# Patient Record
Sex: MALE | Race: BLACK OR AFRICAN AMERICAN | NOT HISPANIC OR LATINO | Employment: OTHER | URBAN - METROPOLITAN AREA
[De-identification: names, ages, dates, MRNs, and addresses within clinical notes are randomized per-mention and may not be internally consistent; named-entity substitution may affect disease eponyms.]

---

## 2022-06-29 ENCOUNTER — HOSPITAL ENCOUNTER (EMERGENCY)
Facility: HOSPITAL | Age: 87
Discharge: HOME | End: 2022-06-29
Attending: FAMILY MEDICINE
Payer: MEDICARE

## 2022-06-29 VITALS
SYSTOLIC BLOOD PRESSURE: 115 MMHG | WEIGHT: 198 LBS | RESPIRATION RATE: 16 BRPM | HEIGHT: 68 IN | HEART RATE: 63 BPM | BODY MASS INDEX: 30.01 KG/M2 | TEMPERATURE: 98 F | DIASTOLIC BLOOD PRESSURE: 64 MMHG | OXYGEN SATURATION: 97 %

## 2022-06-29 DIAGNOSIS — N30.01 ACUTE CYSTITIS WITH HEMATURIA: Primary | ICD-10-CM

## 2022-06-29 LAB
APPEARANCE UR: ABNORMAL
BACTERIA #/AREA URNS AUTO: ABNORMAL /HPF
BILIRUB UR QL STRIP.AUTO: NEGATIVE MG/DL
COLOR UR AUTO: ABNORMAL
GLUCOSE UR QL STRIP.AUTO: NEGATIVE MG/DL
KETONES UR QL STRIP.AUTO: NEGATIVE MG/DL
LEUKOCYTE ESTERASE UR QL STRIP.AUTO: 500 UNIT/L
NITRITE UR QL STRIP.AUTO: NEGATIVE
PH UR STRIP.AUTO: 6 [PH]
PROT UR QL STRIP.AUTO: 100 MG/DL
RBC #/AREA URNS AUTO: >=100 /HPF
RBC UR QL AUTO: 3 UNIT/L
SP GR UR STRIP.AUTO: 1.02
UROBILINOGEN UR STRIP-ACNC: NORMAL MG/DL
WBC #/AREA URNS AUTO: ABNORMAL /HPF

## 2022-06-29 PROCEDURE — 25000003 PHARM REV CODE 250: Performed by: FAMILY MEDICINE

## 2022-06-29 PROCEDURE — 81001 URINALYSIS AUTO W/SCOPE: CPT | Performed by: FAMILY MEDICINE

## 2022-06-29 PROCEDURE — 99283 EMERGENCY DEPT VISIT LOW MDM: CPT | Mod: 25

## 2022-06-29 RX ORDER — CEPHALEXIN 500 MG/1
500 CAPSULE ORAL 4 TIMES DAILY
Qty: 28 CAPSULE | Refills: 0 | Status: SHIPPED | OUTPATIENT
Start: 2022-06-29 | End: 2022-07-06

## 2022-06-29 RX ORDER — FINASTERIDE 5 MG/1
5 TABLET, FILM COATED ORAL
Status: ON HOLD | COMMUNITY
End: 2024-01-02

## 2022-06-29 RX ORDER — CEPHALEXIN 500 MG/1
500 CAPSULE ORAL ONCE
Status: COMPLETED | OUTPATIENT
Start: 2022-06-29 | End: 2022-06-29

## 2022-06-29 RX ORDER — TAMSULOSIN HYDROCHLORIDE 0.4 MG/1
0.4 CAPSULE ORAL
Status: ON HOLD | COMMUNITY
End: 2024-01-02

## 2022-06-29 RX ORDER — AMLODIPINE BESYLATE 10 MG/1
10 TABLET ORAL DAILY
Status: ON HOLD | COMMUNITY
End: 2024-01-02

## 2022-06-29 RX ADMIN — CEPHALEXIN 500 MG: 500 CAPSULE ORAL at 02:06

## 2022-06-29 NOTE — DISCHARGE INSTRUCTIONS
You came into the emergency department with blood in your urine.  You tested positive for urinary tract infection.  Received your 1st dose of antibiotics inside the emergency department and will be discharged with antibiotics.  Please note important to finish her full course of antibiotics.    Follow-up with urologist for re-evaluation.  I recommend taking your Flomax as recommended.    Return to the emergency department if he started to feel lightheaded, loss of consciousness, chest pain, trouble breathing, not able to urinate like normal.

## 2022-06-29 NOTE — Clinical Note
Mr. Cadet accompanied their father to the emergency department on 6/29/2022. They may return to work on 06/30/2022.      If you have any questions or concerns, please don't hesitate to call.      Miracle MUSA

## 2022-06-29 NOTE — ED PROVIDER NOTES
Name: Chichi Mckee   Age: 89 y.o.  Sex: male    Chief complaint:   Chief Complaint   Patient presents with    Hematuria     Pt to ed c/o blood in urine x a few days. Reports hx of enlarged prostate      Patient arrived with: Private  History obtained from: Patient    Subjective:   89-year-old male with a past medical history of BPH, hypertension, peripheral arterial disease that presents to emergency department for hematuria.  Patient is hard of hearing, had a son with them who helps with history.  Patient said he has been having hematuria for few days.  Denies dysuria, urinary frequency or urgency.  He has had hematuria in the past was diagnosed with BPH has currently taking medications for it.  Son says he is not consistent with his medications recently.  Denies lightheadedness or dizziness, syncopal episodes.  Denies chest pain or shortness of breath.  Denies fever, chills, sweats, cough, sore throat, runny nose, abdominal pain, nausea, vomiting, diarrhea.    Past Medical History:   Diagnosis Date    Hypertension      No past surgical history on file.  Social History     Socioeconomic History    Marital status:    Tobacco Use    Smoking status: Never Smoker    Smokeless tobacco: Never Used   Substance and Sexual Activity    Alcohol use: Yes    Drug use: Never     Review of patient's allergies indicates:   Allergen Reactions    Hydrocodone-acetaminophen         Review of Systems   Constitutional: Negative for diaphoresis and fever.   HENT: Negative for congestion and sore throat.    Eyes: Negative for pain and discharge.   Respiratory: Negative for cough and shortness of breath.    Cardiovascular: Negative for chest pain and palpitations.   Gastrointestinal: Negative for diarrhea and vomiting.   Genitourinary: Positive for hematuria. Negative for dysuria.   Musculoskeletal: Negative for back pain and myalgias.   Skin: Negative for itching and rash.   Neurological: Negative for weakness and  headaches.          Objective:     Vitals:    06/29/22 1131   BP: 107/61   Pulse: 68   Resp:    Temp:         Physical Exam  Constitutional:       Appearance: He is not toxic-appearing.   HENT:      Head: Normocephalic and atraumatic.   Cardiovascular:      Rate and Rhythm: Regular rhythm.      Pulses: Normal pulses.   Pulmonary:      Effort: Pulmonary effort is normal.      Breath sounds: Normal breath sounds.   Abdominal:      General: Abdomen is flat. Bowel sounds are normal.      Palpations: Abdomen is soft.      Tenderness: There is no right CVA tenderness or left CVA tenderness.   Musculoskeletal:         General: No deformity. Normal range of motion.      Cervical back: Normal range of motion and neck supple.   Skin:     General: Skin is warm and dry.   Neurological:      General: No focal deficit present.      Mental Status: He is alert and oriented to person, place, and time.   Psychiatric:         Mood and Affect: Mood normal.         Behavior: Behavior normal.          Records:  Nursing records and triage records reviewed  Prior records reviewed    Medical decision making:   Presents to the emergency department for hematuria, history of BPH and not consistent with this Flomax.  Patient is stable and nontoxic appearing.  Vital signs are within normal limits.  Physical exam within normal limits with no CVA tenderness and no abdominal tenderness.  Will get a UA for further evaluation.  Low suspicion for anemia with the amount of hematuria patient has been having and has no symptomatic signs, does not need further labs at this time.    ED Course as of 06/29/22 1356   Wed Jun 29, 2022   1354 UA concerning for urinary tract infection.  Patient received 1st dose of antibiotics inside the emergency department.    Re-evaluation patient continues to be comfortable nontoxic appearing.  He will be okay for discharge and follow up with his urologist for re-evaluation.  We discussed return precautions and I listed in  the discharge instructions.  Patient understands the plan, had no further questions, felt safe for discharge. [RK]      ED Course User Index  [RK] Ramírez Sue MD          EKG:       Procedures       Diagnosis:  Final diagnoses:  [N30.01] Acute cystitis with hematuria (Primary)     ED Prescriptions     Medication Sig Dispense Start Date End Date Auth. Provider    cephALEXin (KEFLEX) 500 MG capsule Take 1 capsule (500 mg total) by mouth 4 (four) times daily. for 7 days 28 capsule 6/29/2022 7/6/2022 Ramírez Sue MD        Follow-up Information     Follow up With Specialties Details Why Contact Info    [facility]   [address] [facility] [address] [phone]          Ramírez Sue M.D.  Emergency Medicine Physician     (Please note that this chart was completed via voice to text dictation. There may be typographical errors or substitutions that are unintentional, or uncorrected. Every attempt was made to proofread the chart prior to completion. If there are any questions, please contact the physician for final clarification).       Ramírez Sue MD  06/29/22 3376

## 2022-06-29 NOTE — Clinical Note
Mr. Cadet accompanied their child to the emergency department on 6/29/2022. They may return to work on 06/30/2022.      If you have any questions or concerns, please don't hesitate to call.      Miracle MUSA

## 2022-06-29 NOTE — Clinical Note
Mr. Mckee accompanied their father to the emergency department on 6/29/2022. They may return to work on 06/30/2022.      If you have any questions or concerns, please don't hesitate to call.      Miracle MUSA

## 2022-07-03 LAB
BACTERIA UR CULT: ABNORMAL
BACTERIA UR CULT: ABNORMAL

## 2023-12-16 ENCOUNTER — HOSPITAL ENCOUNTER (INPATIENT)
Facility: HOSPITAL | Age: 88
LOS: 5 days | Discharge: NURSING, REHAB OR LONG-TERM CARE | DRG: 062 | End: 2023-12-21
Attending: EMERGENCY MEDICINE | Admitting: INTERNAL MEDICINE
Payer: MEDICARE

## 2023-12-16 DIAGNOSIS — I63.9 CVA (CEREBRAL VASCULAR ACCIDENT): ICD-10-CM

## 2023-12-16 DIAGNOSIS — R29.818 ACUTE FOCAL NEUROLOGICAL DEFICIT: ICD-10-CM

## 2023-12-16 DIAGNOSIS — I63.9 STROKE: ICD-10-CM

## 2023-12-16 LAB
ALBUMIN SERPL-MCNC: 3.4 G/DL (ref 3.4–4.8)
ALBUMIN/GLOB SERPL: 1 RATIO (ref 1.1–2)
ALP SERPL-CCNC: 83 UNIT/L (ref 40–150)
ALT SERPL-CCNC: 11 UNIT/L (ref 0–55)
ANION GAP SERPL CALC-SCNC: 16 MMOL/L (ref 8–16)
AST SERPL-CCNC: 12 UNIT/L (ref 5–34)
BASOPHILS # BLD AUTO: 0.04 X10(3)/MCL
BASOPHILS NFR BLD AUTO: 1.1 %
BILIRUB SERPL-MCNC: 0.7 MG/DL
BUN SERPL-MCNC: 16 MG/DL (ref 8.4–25.7)
BUN SERPL-MCNC: 18 MG/DL (ref 6–30)
CALCIUM SERPL-MCNC: 9.9 MG/DL (ref 8.8–10)
CHLORIDE SERPL-SCNC: 106 MMOL/L (ref 95–110)
CHLORIDE SERPL-SCNC: 111 MMOL/L (ref 98–111)
CHOLEST SERPL-MCNC: 152 MG/DL
CHOLEST/HDLC SERPL: 5 {RATIO} (ref 0–5)
CO2 SERPL-SCNC: 20 MMOL/L (ref 23–31)
CREAT SERPL-MCNC: 1.4 MG/DL (ref 0.5–1.4)
CREAT SERPL-MCNC: 1.41 MG/DL (ref 0.73–1.18)
EOSINOPHIL # BLD AUTO: 0.1 X10(3)/MCL (ref 0–0.9)
EOSINOPHIL NFR BLD AUTO: 2.9 %
ERYTHROCYTE [DISTWIDTH] IN BLOOD BY AUTOMATED COUNT: 13.7 % (ref 11.5–17)
GFR SERPLBLD CREATININE-BSD FMLA CKD-EPI: 46 MLS/MIN/1.73/M2
GLOBULIN SER-MCNC: 3.5 GM/DL (ref 2.4–3.5)
GLUCOSE SERPL-MCNC: 78 MG/DL (ref 70–110)
GLUCOSE SERPL-MCNC: 90 MG/DL (ref 75–121)
HCT VFR BLD AUTO: 45.8 % (ref 42–52)
HCT VFR BLD CALC: 47 %PCV (ref 36–54)
HDLC SERPL-MCNC: 33 MG/DL (ref 35–60)
HGB BLD-MCNC: 14.9 G/DL (ref 14–18)
HGB BLD-MCNC: 16 G/DL
IMM GRANULOCYTES # BLD AUTO: 0.01 X10(3)/MCL (ref 0–0.04)
IMM GRANULOCYTES NFR BLD AUTO: 0.3 %
INR PPP: 1.1
LDLC SERPL CALC-MCNC: 105 MG/DL (ref 50–140)
LYMPHOCYTES # BLD AUTO: 1.05 X10(3)/MCL (ref 0.6–4.6)
LYMPHOCYTES NFR BLD AUTO: 30.2 %
MCH RBC QN AUTO: 28.8 PG (ref 27–31)
MCHC RBC AUTO-ENTMCNC: 32.5 G/DL (ref 33–36)
MCV RBC AUTO: 88.4 FL (ref 80–94)
MONOCYTES # BLD AUTO: 0.23 X10(3)/MCL (ref 0.1–1.3)
MONOCYTES NFR BLD AUTO: 6.6 %
NEUTROPHILS # BLD AUTO: 2.05 X10(3)/MCL (ref 2.1–9.2)
NEUTROPHILS NFR BLD AUTO: 58.9 %
NRBC BLD AUTO-RTO: 0 %
PLATELET # BLD AUTO: 173 X10(3)/MCL (ref 130–400)
PMV BLD AUTO: 11.2 FL (ref 7.4–10.4)
POC IONIZED CALCIUM: 1.25 MMOL/L (ref 1.06–1.42)
POC PTINR: 1 (ref 0.9–1.2)
POC PTWBT: 12.2 SEC (ref 9.7–14.3)
POC TCO2 (MEASURED): 25 MMOL/L (ref 23–29)
POCT GLUCOSE: 102 MG/DL (ref 70–110)
POCT GLUCOSE: 99 MG/DL (ref 70–110)
POTASSIUM BLD-SCNC: 3.5 MMOL/L (ref 3.5–5.1)
POTASSIUM SERPL-SCNC: 3.8 MMOL/L (ref 3.5–5.1)
PROT SERPL-MCNC: 6.9 GM/DL (ref 5.8–7.6)
PROTHROMBIN TIME: 13.6 SECONDS (ref 12.5–14.5)
RBC # BLD AUTO: 5.18 X10(6)/MCL (ref 4.7–6.1)
SAMPLE: NORMAL
SAMPLE: NORMAL
SODIUM BLD-SCNC: 142 MMOL/L (ref 136–145)
SODIUM SERPL-SCNC: 139 MMOL/L (ref 132–146)
TRIGL SERPL-MCNC: 71 MG/DL (ref 34–140)
TSH SERPL-ACNC: 2.38 UIU/ML (ref 0.35–4.94)
VLDLC SERPL CALC-MCNC: 14 MG/DL
WBC # SPEC AUTO: 3.48 X10(3)/MCL (ref 4.5–11.5)

## 2023-12-16 PROCEDURE — 99223 1ST HOSP IP/OBS HIGH 75: CPT | Mod: ,,, | Performed by: PSYCHIATRY & NEUROLOGY

## 2023-12-16 PROCEDURE — 25000003 PHARM REV CODE 250

## 2023-12-16 PROCEDURE — 96374 THER/PROPH/DIAG INJ IV PUSH: CPT

## 2023-12-16 PROCEDURE — 84443 ASSAY THYROID STIM HORMONE: CPT | Performed by: EMERGENCY MEDICINE

## 2023-12-16 PROCEDURE — 80061 LIPID PANEL: CPT | Performed by: EMERGENCY MEDICINE

## 2023-12-16 PROCEDURE — 80053 COMPREHEN METABOLIC PANEL: CPT | Performed by: EMERGENCY MEDICINE

## 2023-12-16 PROCEDURE — 63600175 PHARM REV CODE 636 W HCPCS: Mod: JG

## 2023-12-16 PROCEDURE — 20000000 HC ICU ROOM

## 2023-12-16 PROCEDURE — 85610 PROTHROMBIN TIME: CPT | Performed by: EMERGENCY MEDICINE

## 2023-12-16 PROCEDURE — 93005 ELECTROCARDIOGRAM TRACING: CPT

## 2023-12-16 PROCEDURE — A4216 STERILE WATER/SALINE, 10 ML: HCPCS | Performed by: EMERGENCY MEDICINE

## 2023-12-16 PROCEDURE — 3E03317 INTRODUCTION OF OTHER THROMBOLYTIC INTO PERIPHERAL VEIN, PERCUTANEOUS APPROACH: ICD-10-PCS | Performed by: EMERGENCY MEDICINE

## 2023-12-16 PROCEDURE — 99285 EMERGENCY DEPT VISIT HI MDM: CPT | Mod: 25

## 2023-12-16 PROCEDURE — 93010 ELECTROCARDIOGRAM REPORT: CPT | Mod: ,,, | Performed by: INTERNAL MEDICINE

## 2023-12-16 PROCEDURE — 85025 COMPLETE CBC W/AUTO DIFF WBC: CPT | Performed by: EMERGENCY MEDICINE

## 2023-12-16 PROCEDURE — 80048 BASIC METABOLIC PNL TOTAL CA: CPT | Mod: XB

## 2023-12-16 PROCEDURE — 25000003 PHARM REV CODE 250: Performed by: EMERGENCY MEDICINE

## 2023-12-16 RX ORDER — SODIUM CHLORIDE 0.9 % (FLUSH) 0.9 %
10 SYRINGE (ML) INJECTION ONCE
Status: COMPLETED | OUTPATIENT
Start: 2023-12-16 | End: 2023-12-16

## 2023-12-16 RX ORDER — SODIUM CHLORIDE 9 MG/ML
INJECTION, SOLUTION INTRAVENOUS CONTINUOUS
Status: DISCONTINUED | OUTPATIENT
Start: 2023-12-16 | End: 2023-12-21 | Stop reason: HOSPADM

## 2023-12-16 RX ORDER — LABETALOL HYDROCHLORIDE 5 MG/ML
10 INJECTION, SOLUTION INTRAVENOUS EVERY 4 HOURS PRN
Status: DISCONTINUED | OUTPATIENT
Start: 2023-12-16 | End: 2023-12-21 | Stop reason: HOSPADM

## 2023-12-16 RX ORDER — HYDRALAZINE HYDROCHLORIDE 20 MG/ML
10 INJECTION INTRAMUSCULAR; INTRAVENOUS EVERY 6 HOURS PRN
Status: DISCONTINUED | OUTPATIENT
Start: 2023-12-16 | End: 2023-12-21 | Stop reason: HOSPADM

## 2023-12-16 RX ADMIN — Medication 22.5 MG: at 12:12

## 2023-12-16 RX ADMIN — SODIUM CHLORIDE, PRESERVATIVE FREE 10 ML: 5 INJECTION INTRAVENOUS at 12:12

## 2023-12-16 RX ADMIN — SODIUM CHLORIDE: 9 INJECTION, SOLUTION INTRAVENOUS at 02:12

## 2023-12-16 NOTE — Clinical Note
Diagnosis: Acute focal neurological deficit [331517]   Admitting Provider:: CINTIA CORTES [[id number]]   Future Attending Provider: CINTIA CORTES [[id number]]   Admit to which facility:: [facility] [[id number]]   Reason for IP Medical Treatment  (Clinical interventions that can only be accomplished in the IP setting? ) :: Needs inpatient managment   Estimated Length of Stay:: 3-4 midnights   I certify that Inpatient services for greater than or equal to 2 midnights are medically necessary:: Yes   Plans for Post-Acute care--if anticipated (pick the single best option):: A. No post acute care anticipated at this time   Special Needs:: No Special Needs [1]

## 2023-12-16 NOTE — HPI
Chichi Mckee is a 89-year-old male with past medical history of of hypertension who presented as a Code Fast to [facility] ER with complaints of slurred speech, right facial droop, right-sided weakness. LKN 11 am this morning. Family reports, that he was having some dysarthria and ataxia yesterday at noon but symptoms resolved. Symptoms started again but much more severe. NIH 13. CT head negative for bleed or acute infarct. Spoke with Dr. Rahman who recommended TNK. TNK given at 1248. Appears to be possible LVO. CTA pending. Will update once final read is available.

## 2023-12-16 NOTE — CONSULTS
[facility] - Emergency Dept  Neurology  Consult Note    Patient Name: Chichi Mckee  MRN: [id number]  Admission Date: 12/16/2023  Hospital Length of Stay: 0 days  Code Status: No Order   Attending Provider: Dandy Duncan III, MD   Consulting Provider: Mariel Ojeda NP  Primary Care Physician: No, Primary Doctor  Principal Problem:<principal problem not specified>    Inpatient consult to Vascular (Stroke) Neurology  Consult performed by: Mariel Ojeda NP  Consult ordered by: Dandy Duncan III, MD         Subjective:     Chief Complaint:  No chief complaint on file.         HPI:   Chichi Mckee is a 89-year-old male with past medical history of of hypertension who presented as a Code Fast to [facility] ER with complaints of slurred speech, right facial droop, right-sided weakness. LKN 11 am this morning. Family reports, that he was having some dysarthria and ataxia yesterday at noon but symptoms resolved. Symptoms started again but much more severe. NIH 13. CT head negative for bleed or acute infarct. Spoke with Dr. Rahman who recommended TNK. TNK given at 1248. Appears to be possible LVO. CTA pending. Will update once final read is available.      Past Medical History:   Diagnosis Date    Hypertension        No past surgical history on file.    Review of patient's allergies indicates:   Allergen Reactions    Hydrocodone-acetaminophen        Current Neurological Medications:     No current facility-administered medications on file prior to encounter.     Current Outpatient Medications on File Prior to Encounter   Medication Sig    amLODIPine (NORVASC) 10 MG tablet Take 10 mg by mouth once daily.    finasteride (PROSCAR) 5 mg tablet Take 5 mg by mouth.    tamsulosin (FLOMAX) 0.4 mg Cap Take 0.4 mg by mouth.     Family History    None       Tobacco Use    Smoking status: Never    Smokeless tobacco: Never   Substance and Sexual Activity    Alcohol use: Yes    Drug use: Never    Sexual  "activity: Not on file     Review of Systems   Unable to perform ROS: Acuity of condition     Objective:     Vital Signs (Most Recent):  Temp: 97.2 °F (36.2 °C) (12/16/23 1226)  Pulse: 99 (12/16/23 1226)  Resp: 18 (12/16/23 1226)  BP: (!) 162/90 (12/16/23 1226)  SpO2: 98 % (12/16/23 1226) Vital Signs (24h Range):  Temp:  [97.2 °F (36.2 °C)] 97.2 °F (36.2 °C)  Pulse:  [99] 99  Resp:  [18] 18  SpO2:  [98 %] 98 %  BP: (162)/(90) 162/90     Weight: 89.8 kg (198 lb)  Body mass index is 28.41 kg/m².     Physical Exam  HENT:      Head: Normocephalic and atraumatic.      Mouth/Throat:      Mouth: Mucous membranes are moist.   Eyes:      Extraocular Movements: Extraocular movements intact.      Pupils: Pupils are equal, round, and reactive to light.   Pulmonary:      Effort: Pulmonary effort is normal.   Musculoskeletal:         General: Normal range of motion.      Cervical back: Normal range of motion and neck supple.   Skin:     General: Skin is warm and dry.      Capillary Refill: Capillary refill takes less than 2 seconds.   Neurological:      Mental Status: He is alert.      Comments: Alert. Followed commands. Severe dysarthria with aphasia.  RUE drift  LUE 5/5  RLE no movement  LLE 5/5            NEUROLOGICAL EXAMINATION:     CRANIAL NERVES     CN III, IV, VI   Pupils are equal, round, and reactive to light.      Significant Labs: CBC:   Recent Labs   Lab 12/16/23  1243   HCT 47     CMP: No results for input(s): "GLU", "NA", "K", "CL", "CO2", "BUN", "CREATININE", "CALCIUM", "MG", "PROT", "ALBUMIN", "BILITOT", "ALKPHOS", "AST", "ALT", "ANIONGAP", "EGFRNONAA" in the last 48 hours.    Significant Imaging: I have reviewed all pertinent imaging results/findings within the past 24 hours.  Assessment and Plan:     Stroke  Presented with MARJ JOSEPH, on 112/16/23  RF: Hypertension  Etiology: TBD    Plan:    S/p TNK    -permissive HTN for now ... SBP less than 180  -q1hr neuro checks  -STAT CTh for any HA or neuro change  -HOB " flat, Bedrest, NPO x24 hours post TNK  -repeat CTh after 24 hours to determine if antiplatelet therapy can be initiated     CTA pending. Will update if MT indicated    Stroke documentation:    Code FAST 1230  NP 1231  MD notified 1233  CT head negative, spoke with radiology at 1242  TNK decision with MD 1244  TNK received 1248  MT- CTA pending. Will update   NIH 13        VTE Risk Mitigation (From admission, onward)      None            Thank you for your consult. Will follow-up with patient. Please contact us if you have any additional questions.    Mariel Ojeda NP  Neurology  [facility] - Emergency Dept

## 2023-12-16 NOTE — ED PROVIDER NOTES
Encounter Date: 12/16/2023    SCRIBE #1 NOTE: I, Sylvie Schaeffer, am scribing for, and in the presence of,  Dandy Duncan III, MD. I have scribed the following portions of the note - Other sections scribed: HPI, ROS, PE.       History   No chief complaint on file.    89 year old male with history of HTN presents to the ED via EMS for stroke-like symptoms. Per EMS, pt began having slurred speech yesterday afternoon with decreased dexterity. Pt's symptoms improved by last night. This morning, pt began stuttering and has right arm weakness and numbness. Pt's relative notes that he was last seen normal at noon yesterday.  But completely improved and at 9:00 a.m. this morning was fine and symptoms started again at 11:00 a.m.    The history is provided by the EMS personnel and a relative. The history is limited by the condition of the patient. No  was used.     Review of patient's allergies indicates:   Allergen Reactions    Hydrocodone-acetaminophen      Past Medical History:   Diagnosis Date    Hypertension      No past surgical history on file.  No family history on file.  Social History     Tobacco Use    Smoking status: Never    Smokeless tobacco: Never   Substance Use Topics    Alcohol use: Yes    Drug use: Never     Review of Systems   Unable to perform ROS: Mental status change       Physical Exam     Initial Vitals [12/16/23 1226]   BP Pulse Resp Temp SpO2   (!) 162/90 99 18 97.2 °F (36.2 °C) 98 %      MAP       --         Physical Exam    Nursing note and vitals reviewed.  Constitutional: No distress.   HENT:   Head: Normocephalic and atraumatic.   Neck: Trachea normal.   Cardiovascular:  Normal rate and regular rhythm.           No murmur heard.  Pulmonary/Chest: Breath sounds normal. No respiratory distress.   Abdominal: Abdomen is soft. Bowel sounds are normal. He exhibits no distension. There is no abdominal tenderness.   Musculoskeletal:         General: Normal range of motion.       Lumbar back: Normal.     Neurological: He is alert. No cranial nerve deficit.   Stuttering speech. Not following commands. Right arm flaccid, paralysis    Skin: Skin is warm and dry. No rash noted.         ED Course   Critical Care    Date/Time: 12/16/2023 2:11 PM    Performed by: Dandy Duncan III, MD  Authorized by: Dandy Duncan III, MD  Direct patient critical care time: 45 minutes  Documentation critical care time: 5 minutes  Consulting other physicians critical care time: 5 minutes  Total critical care time (exclusive of procedural time) : 55 minutes  Critical care was necessary to treat or prevent imminent or life-threatening deterioration of the following conditions: CNS failure or compromise.  Critical care was time spent personally by me on the following activities: discussions with primary provider, discussions with consultants, examination of patient, re-evaluation of patient's condition, ordering and review of laboratory studies, ordering and performing treatments and interventions, evaluation of patient's response to treatment, interpretation of cardiac output measurements, ordering and review of radiographic studies and pulse oximetry.        Labs Reviewed   COMPREHENSIVE METABOLIC PANEL - Abnormal; Notable for the following components:       Result Value    Carbon Dioxide 20 (*)     Creatinine 1.41 (*)     Albumin/Globulin Ratio 1.0 (*)     All other components within normal limits   LIPID PANEL - Abnormal; Notable for the following components:    HDL Cholesterol 33 (*)     All other components within normal limits   CBC WITH DIFFERENTIAL - Abnormal; Notable for the following components:    WBC 3.48 (*)     MCHC 32.5 (*)     MPV 11.2 (*)     Neut # 2.05 (*)     All other components within normal limits   PROTIME-INR - Normal   TSH - Normal   CBC W/ AUTO DIFFERENTIAL    Narrative:     The following orders were created for panel order CBC W/ AUTO DIFFERENTIAL.  Procedure                                Abnormality         Status                     ---------                               -----------         ------                     CBC with Differential[[id number]]        Abnormal            Final result                 Please view results for these tests on the individual orders.   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    There is a long segment area of multifocal narrowing in the basilar artery which could represent vaso spasm or areas of multifocal narrowing.    60-70% stenosis in the proximal left internal carotid artery secondary to plaque    No large vessel occlusion seen    A 50% stenosis in the supraclinoid right internal carotid artery    Left thyroid nodules    This report was flagged in Epic as abnormal.      Electronically signed by: Eleuterio Bradley  Date:    12/16/2023  Time:    13:12               Narrative:    EXAMINATION:  CTA STROKE MULTI-PHASE    CLINICAL HISTORY:  Neuro deficit, acute, stroke suspected;    TECHNIQUE:  Non contrast low dose axial images were obtained through the head.  CT angiogram was performed from the level of the daniel to the top of the head following the IV administration 100 cc of Isovue 370 contrast .  Sagittal and coronal reconstructions and maximum intensity projection reconstructions were performed. Arterial stenosis percentages are based on NASCET measurement criteria.  Additional multiplanar reconstructions were performed on post processed imaging.  Automatic exposure control (AEC) is utilized to reduce patient radiation exposure.    COMPARISON:  None    FINDINGS:  Source images: No intracranial mass lesion is seen.  No hemorrhage is seen.  No infarct is seen.  Ventricles and basilar  cisterns appear grossly unremarkable.  No cervical mass or lesion is seen.  No abnormal lymphadenopathy seen.  There appears to be some nodularity in the left thyroid..  Larynx appears normal.  Trachea is midline.  Thoracic inlet shows some chronic interstitial lung changes in the lungs bilaterally..    Vascular images: The aortic arch has some mild atherosclerotic plaque.  No aneurysmal dilatation is seen.  The brachiocephalic artery is ectatic.  There is a 3 vessel arch seen.  Some atherosclerotic plaque is seen at the origin of the left subclavian artery but no significant stenosis is seen.    Both common carotid arteries are widely patent.  There is some calcified plaque seen in the left carotid bulb with approximate 60 to 70% stenosis in the proximal internal carotid artery on the left.  The proximal internal carotid artery is ectatic.    There is calcified plaque in the right carotid bulb and proximal internal carotid artery with a 40% stenosis seen in the proximal internal carotid on the right.  The distal internal carotid arteries are seen to level the petrous ridge and clinoid supraclinoid portions.  There are widely patent.  Some atherosclerotic plaque is seen at the supraclinoid portion of both internal carotid arteries with a 50% stenosis on the right and no significant stenosis on the left.  The MCAs are patent.  M1 segments, M2 segments and M3 segments are patent.  A1 segments are patent.  No thrombus or aneurysm is seen.  Anterior cerebral arteries patent.  No aneurysm or obstruction is seen.  The left vertebral artery is not patent.  It is seen at the origin from the subclavian artery for proximally 1 cm and then no flow is seen in the left vertebral artery.  The right vertebral artery is patent.  There is calcified plaque in the right vertebral artery at the base of the skull.  There is approximately a 80-90% stenosis in the right distal vertebral artery at the base of the skull.  Right vertebral  artery perfuses the basilar artery.  There is reflux of contrast into the distal left vertebral artery from the right vertebral artery.  The basilar artery is narrowed in the mid and distal portion.  Multifocal areas of narrowing are seen concerning for possible vaso spasm.  Other etiologies could include multifocal areas of stenosis.    The posterior cerebral arteries are widely patent.  No aneurysm or obstruction is seen.    .                                       CT HEAD FOR STROKE (Final result)  Result time 12/16/23 12:48:16      Final result by Ronnie Santacruz MD (12/16/23 12:48:16)                   Impression:      No acute intracranial abnormality identified.  Findings of chronic microvascular ischemic disease.    Findings reported to [facility] prior to interpretation.      Electronically signed by: Ronnie Santacruz  Date:    12/16/2023  Time:    12:48               Narrative:    EXAMINATION:  CT HEAD FOR STROKE    CLINICAL HISTORY:  Neuro deficit, acute, stroke suspected;    TECHNIQUE:  Low dose axial images were obtained through the head.  Coronal and sagittal reformations were also performed. Contrast was not administered.    Automatic exposure control was utilized to reduce the patient's radiation dose.    DLP= 943    COMPARISON:  10/02/2014    FINDINGS:  No acute intracranial hemorrhage, edema or mass. No acute parenchymal abnormality.    Diffuse cerebral atrophy with concordant ventricular enlargement.    Scattered hypodensities throughout the deep periventricular white matter.    The osseous structures are normal.    The mastoid air cells are clear.    The auditory canals are patent bilaterally.    The globes and orbital contents are normal bilaterally.    The visualized maxillary, ethmoid and sphenoid sinuses are clear.                                       Medications   tenecteplase (TNKase) IV KIT 22.5 mg (22.5 mg Intravenous Given 12/16/23 1248)     Followed by   sodium chloride 0.9% flush 10 mL  (10 mLs Intravenous Given 12/16/23 1250)     Medical Decision Making  Differential diagnosis includes but is not limited to TIA, CVA     Initial confusion about last known well times but after discussion with multiple family members it was nail down that patient had acute onset of the symptoms at 11 after being completely normal was seen and sent to CT for code fast protocol patient's CT head without abnormality patient without contraindications within window family states that patient is normally awake alert oriented active walks patient given T and case at 18 minute daquan patient with moderate improvement NIH went from around 14to6 patient now able to speak CT angio of the brain did not reveal any MCA occlusion will not go to cath lab discussed case with intensivist care team will admit    Problems Addressed:  Acute focal neurological deficit: complicated acute illness or injury with systemic symptoms that poses a threat to life or bodily functions    Amount and/or Complexity of Data Reviewed  Independent Historian: EMS     Details: Per EMS, pt began having slurred speech yesterday afternoon with decreased dexterity. Pt's symptoms improved by last night. This morning, pt began stuttering and has right arm weakness and numbness. Pt's relative notes that he was last seen normal at noon yesterday  External Data Reviewed: notes.  Labs: ordered.  Radiology: ordered.    Risk  Prescription drug management.  Decision regarding hospitalization.              Attending Attestation:           Physician Attestation for Scribe:  Physician Attestation Statement for Scribe #1: I, Dandy Duncan III, MD, reviewed documentation, as scribed by Sylvie Schaeffer in my presence, and it is both accurate and complete.                                    Clinical Impression:  Final diagnoses:  [R29.818] Acute focal neurological deficit          ED Disposition Condition    Admit                 Dandy Duncan III, MD  12/16/23 2023

## 2023-12-16 NOTE — H&P
[facility] - Emergency Dept  Pulmonary Critical Care Note    Patient Name: Chichi Mckee  MRN: [id number]  Admission Date: 12/16/2023  Hospital Length of Stay: 0 days  Code Status: No Order  Attending Provider: Jessica Padilla MD  Primary Care Provider: Bia, Primary Doctor     Subjective:     HPI:   Patient is a 89-year-old male with past medical history of BPH, hypertension, peripheral arterial disease, who presented to [facility] ED on 12/16/2023 via EMS for stroke like symptoms.  Patient reported to have slurred speech starting afternoon which improved over the course of night.  Patient then started having speech stuttering and right arm weakness and numbness in the morning improved by 9:00 a.m. but then again started around 11:00 a.m. code fast activated in the ED. CT head with no acute intracranial abnormalities but findings of chronic muscular vascular ischemic changes.  Decision was made to administer TN K after discussion with Neurology.  Initial NIH reported to be 13 per Neurology notes.    Initial vitals blood pressure 162/90, heart rate 99, respiratory rate 18, afebrile 97° F, SpO2 98% on room air.  Labs with leukopenia 3.48, H and H 14.9/45.8, platelets 173.  PT INR 13.6/1.1, CMP with sodium 139, potassium 3.8, bicarb 20, BUN/creatinine 16/1.41.     Hospital Course/Significant events:  12/16-TNKase administered in the ED and admitted to ICU    24 Hour Interval History:  Pending    Past Medical History:   Diagnosis Date    Hypertension        No past surgical history on file.    Social History     Socioeconomic History    Marital status:    Tobacco Use    Smoking status: Never    Smokeless tobacco: Never   Substance and Sexual Activity    Alcohol use: Yes    Drug use: Never           Current Outpatient Medications   Medication Instructions    amLODIPine (NORVASC) 10 mg, Oral, Daily    finasteride (PROSCAR) 5 mg, Oral    tamsulosin (FLOMAX) 0.4 mg, Oral       Current Inpatient  Medications      Current Intravenous Infusions        Review of Systems   Unable to perform ROS: Age          Objective:     No intake or output data in the 24 hours ending 12/16/23 1507      Vital Signs (Most Recent):  Temp: 98 °F (36.7 °C) (12/16/23 1430)  Pulse: 62 (12/16/23 1500)  Resp: 20 (12/16/23 1500)  BP: (!) 136/59 (12/16/23 1500)  SpO2: 100 % (12/16/23 1500)  Body mass index is 28.41 kg/m².  Weight: 89.8 kg (198 lb) Vital Signs (24h Range):  Temp:  [97.2 °F (36.2 °C)-98 °F (36.7 °C)] 98 °F (36.7 °C)  Pulse:  [62-99] 62  Resp:  [10-26] 20  SpO2:  [98 %-100 %] 100 %  BP: (112-162)/(53-97) 136/59     Physical Exam  Vitals reviewed.   Constitutional:       Appearance: Normal appearance.   HENT:      Head: Normocephalic and atraumatic.   Eyes:      Extraocular Movements: Extraocular movements intact.      Conjunctiva/sclera: Conjunctivae normal.      Pupils: Pupils are equal, round, and reactive to light.   Cardiovascular:      Rate and Rhythm: Normal rate and regular rhythm.   Pulmonary:      Effort: Pulmonary effort is normal. No respiratory distress.      Breath sounds: Normal breath sounds.   Abdominal:      General: Bowel sounds are normal. There is no distension.      Palpations: Abdomen is soft. There is no mass.   Musculoskeletal:      Cervical back: Normal range of motion and neck supple.      Comments: Trace pitting edema bilateral lower extremities.   Skin:     General: Skin is warm and dry.   Neurological:      Mental Status: He is alert.      Comments: Secondary to patient with hard hearing.  Oriented to time and place.  Slight pronator drift on right side. Weaker on right side and able to squeeze.  Able to lift legs off bed with 5/5 strength left side, 4/5 strength right lower extremity.           Lines/Drains/Airways       Peripheral Intravenous Line  Duration                  Peripheral IV - Single Lumen 12/16/23 18 G Anterior;Left Forearm <1 day         Peripheral IV - Single Lumen 12/16/23 18  "G Left Antecubital <1 day                    Significant Labs:    Lab Results   Component Value Date    WBC 3.48 (L) 12/16/2023    HGB 14.9 12/16/2023    HCT 45.8 12/16/2023    MCV 88.4 12/16/2023     12/16/2023           BMP  Lab Results   Component Value Date     12/16/2023    K 3.8 12/16/2023    CHLORIDE 111 12/16/2023    CO2 20 (L) 12/16/2023    BUN 16.0 12/16/2023    CREATININE 1.41 (H) 12/16/2023    CALCIUM 9.9 12/16/2023    EGFRNONAA 56 03/26/2019         ABG  No results for input(s): "PH", "PO2", "PCO2", "HCO3", "POCBASEDEF" in the last 168 hours.    Mechanical Ventilation Support:         Significant Imaging:  I have reviewed the pertinent imaging within the past 24 hours.        Assessment/Plan:     Assessment  Right facial droop, right-sided weakness, slurred speech symptoms status post TNKase  BRIAN  Past medical history of hypertension, hyperlipidemia, PAD, moderate aortic stenosis, BPH, bladder mass      Plan  Admit to ICU for close monitoring  s/p TNKase, holding all antiplatelet/anticoagulation therapy for the next 24 hours; Bedrest for 24 hours  NIHSS to be performed 1hr, 24hr, and 7 days or at discharge s/p TNKase  Repeat head CT 24hrs s/p TNKase, ordered for 12:45 tomorrow  Vital signs & Neuro checks: every 15 minutes x 2 hours, then every 30 minutes x 6 hours, then every hour x 16 hours  TNKase blood pressure protocol with necessary medications for BP >180/105 for 24 hours, progress to Cleviprex as needed  Lipitor 80 mg daily  NPO. Swallow study ordered. Speech therapy to additionally eval for aphasia/dysarthria post 24 hour bed rest  Head of Bed flat for 24 hours s/p TNKase  Echo w/ bubble pending   CTA H&N - The left artery is occluded proximally. There is a long segment area of multifocal narrowing in the basilar artery which could represent vaso spasm or areas of multifocal narrowing. 60-70% stenosis in the proximal left internal carotid artery secondary to plaque. No large vessel " occlusion seen. A 50% stenosis in the supraclinoid right internal carotid artery. Left thyroid nodules  MRI brain pending results  PT/OT consult post 24 hour bed rest  continue critical care monitoring      DVT Prophylaxis: SCDs  GI Prophylaxis: famotidine        Praneeth Howard DO  Pulmonary Critical Care Medicine  [facility]  DOS: 12/16/2023

## 2023-12-16 NOTE — ASSESSMENT & PLAN NOTE
Presented with MARJ JOSEPH, on 112/16/23  RF: Hypertension  Etiology: TBD    Plan:    S/p TNK    -permissive HTN for now ... SBP less than 180  -q1hr neuro checks  -STAT CTh for any HA or neuro change  -HOB flat, Bedrest, NPO x24 hours post TNK  -repeat CTh after 24 hours to determine if antiplatelet therapy can be initiated     CTA pending. Will update if MT indicated    Stroke documentation:    Code FAST 1230  NP 1231  MD notified 1233  CT head negative, spoke with radiology at 1242  TNK decision with MD 1244  TNK received 1248  MT- CTA pending. Will update   NIH 13

## 2023-12-16 NOTE — SUBJECTIVE & OBJECTIVE
Past Medical History:   Diagnosis Date    Hypertension        No past surgical history on file.    Review of patient's allergies indicates:   Allergen Reactions    Hydrocodone-acetaminophen        Current Neurological Medications:     No current facility-administered medications on file prior to encounter.     Current Outpatient Medications on File Prior to Encounter   Medication Sig    amLODIPine (NORVASC) 10 MG tablet Take 10 mg by mouth once daily.    finasteride (PROSCAR) 5 mg tablet Take 5 mg by mouth.    tamsulosin (FLOMAX) 0.4 mg Cap Take 0.4 mg by mouth.     Family History    None       Tobacco Use    Smoking status: Never    Smokeless tobacco: Never   Substance and Sexual Activity    Alcohol use: Yes    Drug use: Never    Sexual activity: Not on file     Review of Systems   Unable to perform ROS: Acuity of condition     Objective:     Vital Signs (Most Recent):  Temp: 97.2 °F (36.2 °C) (12/16/23 1226)  Pulse: 99 (12/16/23 1226)  Resp: 18 (12/16/23 1226)  BP: (!) 162/90 (12/16/23 1226)  SpO2: 98 % (12/16/23 1226) Vital Signs (24h Range):  Temp:  [97.2 °F (36.2 °C)] 97.2 °F (36.2 °C)  Pulse:  [99] 99  Resp:  [18] 18  SpO2:  [98 %] 98 %  BP: (162)/(90) 162/90     Weight: 89.8 kg (198 lb)  Body mass index is 28.41 kg/m².     Physical Exam  HENT:      Head: Normocephalic and atraumatic.      Mouth/Throat:      Mouth: Mucous membranes are moist.   Eyes:      Extraocular Movements: Extraocular movements intact.      Pupils: Pupils are equal, round, and reactive to light.   Pulmonary:      Effort: Pulmonary effort is normal.   Musculoskeletal:         General: Normal range of motion.      Cervical back: Normal range of motion and neck supple.   Skin:     General: Skin is warm and dry.      Capillary Refill: Capillary refill takes less than 2 seconds.   Neurological:      Mental Status: He is alert.      Comments: Alert. Followed commands. Severe dysarthria with aphasia.  RUE drift  LUE 5/5  RLE no movement  LLE  "5/5            NEUROLOGICAL EXAMINATION:     CRANIAL NERVES     CN III, IV, VI   Pupils are equal, round, and reactive to light.      Significant Labs: CBC:   Recent Labs   Lab 12/16/23  1243   HCT 47     CMP: No results for input(s): "GLU", "NA", "K", "CL", "CO2", "BUN", "CREATININE", "CALCIUM", "MG", "PROT", "ALBUMIN", "BILITOT", "ALKPHOS", "AST", "ALT", "ANIONGAP", "EGFRNONAA" in the last 48 hours.    Significant Imaging: I have reviewed all pertinent imaging results/findings within the past 24 hours.  "

## 2023-12-16 NOTE — NURSING
Nurses Note -- 4 Eyes      12/16/2023   5:23 PM      Skin assessed during: Admit      [x] No Altered Skin Integrity Present    [x]Prevention Measures Documented      [] Yes- Altered Skin Integrity Present or Discovered   [] LDA Added if Not in Epic (Describe Wound)   [] New Altered Skin Integrity was Present on Admit and Documented in LDA   [] Wound Image Taken    Wound Care Consulted? No    Attending Nurse:  Linnette Bernardo RN/Staff Member:  Maria Luisa Christy RN

## 2023-12-17 LAB
ALBUMIN SERPL-MCNC: 3.3 G/DL (ref 3.4–4.8)
ALBUMIN/GLOB SERPL: 1.1 RATIO (ref 1.1–2)
ALP SERPL-CCNC: 86 UNIT/L (ref 40–150)
ALT SERPL-CCNC: 11 UNIT/L (ref 0–55)
AST SERPL-CCNC: 12 UNIT/L (ref 5–34)
BASOPHILS # BLD AUTO: 0.03 X10(3)/MCL
BASOPHILS NFR BLD AUTO: 0.6 %
BILIRUB SERPL-MCNC: 0.9 MG/DL
BUN SERPL-MCNC: 15.6 MG/DL (ref 8.4–25.7)
CALCIUM SERPL-MCNC: 9.5 MG/DL (ref 8.8–10)
CHLORIDE SERPL-SCNC: 112 MMOL/L (ref 98–111)
CO2 SERPL-SCNC: 21 MMOL/L (ref 23–31)
CREAT SERPL-MCNC: 1.11 MG/DL (ref 0.73–1.18)
EOSINOPHIL # BLD AUTO: 0.22 X10(3)/MCL (ref 0–0.9)
EOSINOPHIL NFR BLD AUTO: 4.3 %
ERYTHROCYTE [DISTWIDTH] IN BLOOD BY AUTOMATED COUNT: 13.9 % (ref 11.5–17)
GFR SERPLBLD CREATININE-BSD FMLA CKD-EPI: >60 MLS/MIN/1.73/M2
GLOBULIN SER-MCNC: 2.9 GM/DL (ref 2.4–3.5)
GLUCOSE SERPL-MCNC: 105 MG/DL (ref 75–121)
HCT VFR BLD AUTO: 46 % (ref 42–52)
HGB BLD-MCNC: 14.5 G/DL (ref 14–18)
HIV 1+2 AB+HIV1 P24 AG SERPL QL IA: NONREACTIVE
IMM GRANULOCYTES # BLD AUTO: 0.01 X10(3)/MCL (ref 0–0.04)
IMM GRANULOCYTES NFR BLD AUTO: 0.2 %
LEFT CCA DIST DIAS: 6 CM/S
LEFT CCA DIST SYS: 56 CM/S
LEFT CCA PROX DIAS: 8 CM/S
LEFT CCA PROX SYS: 65 CM/S
LEFT ECA DIAS: 1 CM/S
LEFT ECA SYS: 222 CM/S
LEFT ICA DIST DIAS: 4 CM/S
LEFT ICA DIST SYS: 30 CM/S
LEFT ICA MID DIAS: 7 CM/S
LEFT ICA MID SYS: 45 CM/S
LEFT ICA PROX DIAS: 24 CM/S
LEFT ICA PROX SYS: 101 CM/S
LEFT VERTEBRAL SYS: 31 CM/S
LYMPHOCYTES # BLD AUTO: 0.88 X10(3)/MCL (ref 0.6–4.6)
LYMPHOCYTES NFR BLD AUTO: 17.1 %
MAGNESIUM SERPL-MCNC: 1.9 MG/DL (ref 1.6–2.6)
MCH RBC QN AUTO: 28.2 PG (ref 27–31)
MCHC RBC AUTO-ENTMCNC: 31.5 G/DL (ref 33–36)
MCV RBC AUTO: 89.3 FL (ref 80–94)
MONOCYTES # BLD AUTO: 0.6 X10(3)/MCL (ref 0.1–1.3)
MONOCYTES NFR BLD AUTO: 11.7 %
NEUTROPHILS # BLD AUTO: 3.4 X10(3)/MCL (ref 2.1–9.2)
NEUTROPHILS NFR BLD AUTO: 66.1 %
NRBC BLD AUTO-RTO: 0 %
OHS CV CAROTID RIGHT ICA EDV HIGHEST: 9
OHS CV CAROTID ULTRASOUND LEFT ICA/CCA RATIO: 1.8
OHS CV CAROTID ULTRASOUND RIGHT ICA/CCA RATIO: 0.94
OHS CV PV CAROTID LEFT HIGHEST CCA: 65
OHS CV PV CAROTID LEFT HIGHEST ICA: 101
OHS CV PV CAROTID RIGHT HIGHEST CCA: 75
OHS CV PV CAROTID RIGHT HIGHEST ICA: 49
OHS CV US CAROTID LEFT HIGHEST EDV: 24
PHOSPHATE SERPL-MCNC: 2.6 MG/DL (ref 2.3–4.7)
PLATELET # BLD AUTO: 196 X10(3)/MCL (ref 130–400)
PMV BLD AUTO: 11.3 FL (ref 7.4–10.4)
POTASSIUM SERPL-SCNC: 4.1 MMOL/L (ref 3.5–5.1)
PROT SERPL-MCNC: 6.2 GM/DL (ref 5.8–7.6)
RBC # BLD AUTO: 5.15 X10(6)/MCL (ref 4.7–6.1)
RIGHT CCA DIST DIAS: 3 CM/S
RIGHT CCA DIST SYS: 52 CM/S
RIGHT CCA PROX DIAS: 6 CM/S
RIGHT CCA PROX SYS: 75 CM/S
RIGHT ECA DIAS: 0 CM/S
RIGHT ECA SYS: 71 CM/S
RIGHT ICA DIST DIAS: 2 CM/S
RIGHT ICA DIST SYS: 28 CM/S
RIGHT ICA MID DIAS: 4 CM/S
RIGHT ICA MID SYS: 38 CM/S
RIGHT ICA PROX DIAS: 9 CM/S
RIGHT ICA PROX SYS: 49 CM/S
RIGHT VERTEBRAL DIAS: 11 CM/S
RIGHT VERTEBRAL SYS: 32 CM/S
SODIUM SERPL-SCNC: 141 MMOL/L (ref 132–146)
T PALLIDUM AB SER QL: NONREACTIVE
WBC # SPEC AUTO: 5.14 X10(3)/MCL (ref 4.5–11.5)

## 2023-12-17 PROCEDURE — 86780 TREPONEMA PALLIDUM: CPT

## 2023-12-17 PROCEDURE — 87389 HIV-1 AG W/HIV-1&-2 AB AG IA: CPT

## 2023-12-17 PROCEDURE — 84100 ASSAY OF PHOSPHORUS: CPT

## 2023-12-17 PROCEDURE — 85025 COMPLETE CBC W/AUTO DIFF WBC: CPT

## 2023-12-17 PROCEDURE — 25000003 PHARM REV CODE 250: Performed by: INTERNAL MEDICINE

## 2023-12-17 PROCEDURE — 99233 SBSQ HOSP IP/OBS HIGH 50: CPT | Mod: ,,, | Performed by: PSYCHIATRY & NEUROLOGY

## 2023-12-17 PROCEDURE — 87340 HEPATITIS B SURFACE AG IA: CPT

## 2023-12-17 PROCEDURE — 92610 EVALUATE SWALLOWING FUNCTION: CPT

## 2023-12-17 PROCEDURE — 20000000 HC ICU ROOM

## 2023-12-17 PROCEDURE — 83735 ASSAY OF MAGNESIUM: CPT

## 2023-12-17 PROCEDURE — 80053 COMPREHEN METABOLIC PANEL: CPT

## 2023-12-17 PROCEDURE — 86803 HEPATITIS C AB TEST: CPT

## 2023-12-17 PROCEDURE — 25000003 PHARM REV CODE 250

## 2023-12-17 RX ORDER — MUPIROCIN 20 MG/G
OINTMENT TOPICAL 2 TIMES DAILY
Status: DISCONTINUED | OUTPATIENT
Start: 2023-12-17 | End: 2023-12-21 | Stop reason: HOSPADM

## 2023-12-17 RX ORDER — NOREPINEPHRINE BITARTRATE/D5W 8 MG/250ML
0-3 PLASTIC BAG, INJECTION (ML) INTRAVENOUS CONTINUOUS
Status: DISCONTINUED | OUTPATIENT
Start: 2023-12-17 | End: 2023-12-18

## 2023-12-17 RX ADMIN — MUPIROCIN: 20 OINTMENT TOPICAL at 11:12

## 2023-12-17 RX ADMIN — MUPIROCIN: 20 OINTMENT TOPICAL at 08:12

## 2023-12-17 RX ADMIN — SODIUM CHLORIDE: 9 INJECTION, SOLUTION INTRAVENOUS at 04:12

## 2023-12-17 RX ADMIN — SODIUM CHLORIDE: 9 INJECTION, SOLUTION INTRAVENOUS at 08:12

## 2023-12-17 NOTE — PROGRESS NOTES
[facility] - [facility] East ICU  Pulmonary Critical Care Note    Patient Name: Chichi Mckee  MRN: [id number]  Admission Date: 12/16/2023  Hospital Length of Stay: 1 days  Code Status: No Order  Attending Provider: Jessica Padilla MD  Primary Care Provider: Bia, Primary Doctor     Subjective:     HPI:   Patient is a 89-year-old male with past medical history of BPH, hypertension, peripheral arterial disease, who presented to [facility] ED on 12/16/2023 via EMS for stroke like symptoms.  Patient reported to have slurred speech starting afternoon which improved over the course of night.  Patient then started having speech stuttering and right arm weakness and numbness in the morning improved by 9:00 a.m. but then again started around 11:00 a.m. code fast activated in the ED. CT head with no acute intracranial abnormalities but findings of chronic muscular vascular ischemic changes.  Decision was made to administer TN K after discussion with Neurology.  Initial NIH reported to be 13 per Neurology notes.     Initial vitals blood pressure 162/90, heart rate 99, respiratory rate 18, afebrile 97° F, SpO2 98% on room air.  Labs with leukopenia 3.48, H and H 14.9/45.8, platelets 173.  PT INR 13.6/1.1, CMP with sodium 139, potassium 3.8, bicarb 20, BUN/creatinine 16/1.41.     Hospital Course/Significant events:  12/16-TNKase administered in the ED and admitted to ICU     24 Hour Interval History:  No acute events overnight. Afebrile and HD stable.   Lab work:  WBCs 5.15, H&H 14.5/46 platelets 196.  Ninety-nine gap metabolic acidosis with sodium 141, chloride 112, bicarb 21, BRIAN improving with BUN creatinine 15.6/1.11.       Past Medical History:   Diagnosis Date    Hypertension        No past surgical history on file.    Social History     Socioeconomic History    Marital status:    Tobacco Use    Smoking status: Never    Smokeless tobacco: Never   Substance and Sexual Activity    Alcohol use: Yes    Drug use: Never            Current Outpatient Medications   Medication Instructions    amLODIPine (NORVASC) 10 mg, Oral, Daily    finasteride (PROSCAR) 5 mg, Oral    tamsulosin (FLOMAX) 0.4 mg, Oral       Current Inpatient Medications      Current Intravenous Infusions   sodium chloride 0.9% 75 mL/hr at 12/17/23 0400    clevidipine           Review of Systems   Unable to perform ROS: Age          Objective:       Intake/Output Summary (Last 24 hours) at 12/17/2023 0909  Last data filed at 12/17/2023 0608  Gross per 24 hour   Intake 1149.96 ml   Output 500 ml   Net 649.96 ml         Vital Signs (Most Recent):  Temp: 98.1 °F (36.7 °C) (12/17/23 0800)  Pulse: 61 (12/17/23 0800)  Resp: 20 (12/17/23 0800)  BP: (!) 148/81 (12/17/23 0800)  SpO2: 100 % (12/17/23 0800)  Body mass index is 28.41 kg/m².  Weight: 89.8 kg (198 lb) Vital Signs (24h Range):  Temp:  [97.2 °F (36.2 °C)-98.2 °F (36.8 °C)] 98.1 °F (36.7 °C)  Pulse:  [54-99] 61  Resp:  [10-26] 20  SpO2:  [98 %-100 %] 100 %  BP: (112-166)/() 148/81     Physical Exam  Vitals reviewed.   Constitutional:       Appearance: Normal appearance. He is not ill-appearing.   HENT:      Head: Normocephalic and atraumatic.      Mouth/Throat:      Mouth: Mucous membranes are moist.      Pharynx: Oropharynx is clear.   Eyes:      Extraocular Movements: Extraocular movements intact.      Conjunctiva/sclera: Conjunctivae normal.      Pupils: Pupils are equal, round, and reactive to light.   Cardiovascular:      Rate and Rhythm: Normal rate and regular rhythm.   Pulmonary:      Effort: Pulmonary effort is normal. No respiratory distress.      Breath sounds: Normal breath sounds.   Abdominal:      General: Bowel sounds are normal. There is no distension.      Palpations: Abdomen is soft. There is no mass.   Musculoskeletal:      Cervical back: Normal range of motion and neck supple.      Right lower leg: No edema.      Left lower leg: No edema.   Skin:     General: Skin is warm and dry.  "  Neurological:      Mental Status: He is alert.      Comments: Patient hard of hearing limiting neuro exam.  Strength equal in bilateral lower extremities 5/5.  Mild weakness in right upper extremity 4/5, 5/5 strength in left upper extremity.           Lines/Drains/Airways       Drain  Duration             Male External Urinary Catheter 12/16/23 1430 <1 day              Peripheral Intravenous Line  Duration                  Peripheral IV - Single Lumen 12/16/23 18 G Anterior;Left Forearm 1 day         Peripheral IV - Single Lumen 12/16/23 18 G Left Antecubital 1 day                    Significant Labs:    Lab Results   Component Value Date    WBC 5.14 12/17/2023    HGB 14.5 12/17/2023    HCT 46.0 12/17/2023    MCV 89.3 12/17/2023     12/17/2023           BMP  Lab Results   Component Value Date     12/17/2023    K 4.1 12/17/2023    CHLORIDE 112 (H) 12/17/2023    CO2 21 (L) 12/17/2023    BUN 15.6 12/17/2023    CREATININE 1.11 12/17/2023    CALCIUM 9.5 12/17/2023    EGFRNONAA 56 03/26/2019         ABG  No results for input(s): "PH", "PO2", "PCO2", "HCO3", "POCBASEDEF" in the last 168 hours.    Mechanical Ventilation Support:         Significant Imaging:  I have reviewed the pertinent imaging within the past 24 hours.        Assessment/Plan:     Assessment  Right facial droop, right-sided weakness, slurred speech symptoms status post TNKase 12/16  BRIAN - improving  Artery stenosis security 60-70% stenosis of proximal L internal carotid artery and 50% stenosis in supraclinoid R internal carotid artery  Mild-to-moderate aortic stenosis per TTE 12/16/2023  Past medical history of hypertension, hyperlipidemia, PAD, moderate aortic stenosis, BPH, bladder mass      Plan  Admit to ICU for close monitoring  s/p TNKase, holding all antiplatelet/anticoagulation therapy for the next 24 hours; Bedrest for 24 hours  NIHSS to be performed 1hr, 24hr, and 7 days or at discharge s/p TNKase  Repeat head CT 24hrs s/p TNKase, " ordered for 12:45 today  Vital signs & Neuro checks: every 15 minutes x 2 hours, then every 30 minutes x 6 hours, then every hour x 16 hours  TNKase blood pressure protocol with necessary medications for BP >180/105 for 24 hours, progress to Cleviprex as needed  Lipitor 80 mg daily  NPO. Swallow study ordered. 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 There is a long segment area of multifocal narrowing in the basilar artery which could represent vaso spasm or areas of multifocal narrowing. 60-70% stenosis in the proximal left internal carotid artery secondary to plaque. No large vessel occlusion seen. A 50% stenosis in the supraclinoid right internal carotid artery. Left thyroid nodules  MRI brain pending results  PT/OT consult post 24 hour bed rest  continue critical care monitoring        DVT Prophylaxis: SCDs  GI Prophylaxis: famotidine     Praneeth Howard DO  Pulmonary Critical Care Medicine  [facility] - [facility] ICU  DOS: 12/17/2023

## 2023-12-17 NOTE — PT/OT/SLP EVAL
[facility]  Speech Language Pathology Department  Clinical Swallow Evaluation    Patient Name:  Chichi Mckee   MRN:  [id number]    Recommendations:     General recommendations:  SLP follow up x1 and Speech/Language and Cognitive Evaluation  Diet texture/consistency recommendations:  Regular solids (IDDSI 7) and thin liquids (IDDSI 0)  Medications: per patient preference  Swallow strategies/precautions: small bites/sips and slow rate 1:1 assist feed   Precautions: Standard,      History:     Chichi Mckee is a/n 89 y.o. male admitted for stroke like symptoms. Pt s/p tenecteplase.  CT head with no acute intracranial abnormalities but findings of chronic muscular vascular ischemic changes.       Past Medical History:   Diagnosis Date    Hypertension      Home diet texture/consistency: Regular and thin liquids  Current method of nutrition: NPO    Patient complaint: denies  Subjective     Patient awake and alert.Pts daughter at bedside.   Spiritual/Cultural/Mormonism Beliefs/Practices that affect care: no  Pain/Comfort:  0/10    Objective:     ORAL MUSCULATURE  Secretion Management: adequate  Mucosal Quality: good  Facial Movement: WFL  Buccal Strength & Mobility: WFL  Mandibular Strength & Mobility: WFL  Vocal Quality: adequate    Consistency Fed By Oral Symptoms Pharyngeal Symptoms   Thin liquid by straw SLP None None   Puree SLP None None   Chewable solid SLP None None     Assessment:     No signs/sx of aspiration observed. REC: initiate regular diet. Pt will require 1:1 assist feeds.   Patient Education:     Patient and daughter/s were provided with verbal education regarding POC.  Understanding was verbalized.    Time Tracking:     SLP Treatment Date:    12/17/23  Speech Start Time:   1115  Speech Stop Time:   1130     Speech Total Time (min):   15    Billable minutes:  Swallow and Oral Function Evaluation, 15 minutes     12/17/2023

## 2023-12-17 NOTE — PROGRESS NOTES
[facility] - [facility]  Neurology  Progress Note    Patient Name: Chichi Mckee  MRN: [id number]  Admission Date: 12/16/2023  Hospital Length of Stay: 1 days  Code Status: No Order   Attending Provider: Jessica Padilla MD  Primary Care Physician: No, Primary Doctor   Principal Problem:<principal problem not specified>      Subjective:     Interval History:   Nursing reports fluctuating neuro exams with worsening right sided weakness and improvements throughout the night, 2 episodes of fluctuation overnight, 1 episode this morning and 1 episode yesterday. He does not currently have his hearing aides and is very hard of hearing.     Current Neurological Medications:     Current Facility-Administered Medications   Medication Dose Route Frequency Provider Last Rate Last Admin    0.9%  NaCl infusion   Intravenous Continuous Jessica Padilla  mL/hr at 12/17/23 0943 Rate Change at 12/17/23 0943    clevidipine (CLEVIPREX) 25 mg/50 mL infusion  0-21 mg/hr Intravenous Continuous Praneeth Howard,         hydrALAZINE injection 10 mg  10 mg Intravenous Q6H PRN Yon Howardet, DO        labetaloL injection 10 mg  10 mg Intravenous Q4H PRN Praneeth Howard, DO        mupirocin 2 % ointment   Nasal BID Jessica Padilla MD           Review of Systems   Neurological:  Positive for speech difficulty and weakness. Negative for dizziness, tremors, seizures, syncope, facial asymmetry, light-headedness, numbness and headaches.     Objective:     Vital Signs (Most Recent):  Temp: 98.1 °F (36.7 °C) (12/17/23 0800)  Pulse: (!) 59 (12/17/23 0914)  Resp: 17 (12/17/23 0914)  BP: (!) 153/83 (12/17/23 0900)  SpO2: 98 % (12/17/23 0914) Vital Signs (24h Range):  Temp:  [97.2 °F (36.2 °C)-98.2 °F (36.8 °C)] 98.1 °F (36.7 °C)  Pulse:  [54-99] 59  Resp:  [10-26] 17  SpO2:  [98 %-100 %] 98 %  BP: (112-166)/() 153/83     Weight: 89.8 kg (198 lb)  Body mass index is 28.41 kg/m².     Physical Exam  Vitals and nursing note reviewed.    Constitutional:       General: He is not in acute distress.     Appearance: He is not toxic-appearing.   HENT:      Head: Normocephalic.      Right Ear: Decreased hearing noted.      Left Ear: Decreased hearing noted.   Eyes:      General: No visual field deficit.     Pupils: Pupils are equal, round, and reactive to light.   Pulmonary:      Effort: Pulmonary effort is normal.   Musculoskeletal:         General: Normal range of motion.      Cervical back: Normal range of motion.      Right lower leg: No edema.      Left lower leg: No edema.   Skin:     General: Skin is warm and dry.      Capillary Refill: Capillary refill takes less than 2 seconds.   Neurological:      Mental Status: He is alert and oriented to person, place, and time.      Cranial Nerves: Dysarthria (very mild dysarthria?) present. No facial asymmetry.      Sensory: No sensory deficit.      Motor: Weakness (4/5 BUE, 3/5 RLE, 4/5 LLE) and pronator drift (mild RUE pronator drift) present. No tremor, atrophy, abnormal muscle tone or seizure activity.          NEUROLOGICAL EXAMINATION:     MENTAL STATUS   Oriented to person, place, and time.     CRANIAL NERVES     CN III, IV, VI   Pupils are equal, round, and reactive to light.      Significant Labs:   Recent Lab Results  (Last 5 results in the past 24 hours)        12/17/23  0118   12/16/23  2027   12/16/23  1638   12/16/23  1307   12/16/23  1243        Albumin/Globulin Ratio 1.1       1.0         Albumin 3.3       3.4         ALP 86       83         ALT 11       11         AST 12       12         Baso # 0.03       0.04         Basophil % 0.6       1.1         BILIRUBIN TOTAL 0.9       0.7         BUN 15.6       16.0         Calcium 9.5       9.9         Chloride 112       111         Cholesterol Total       152         CO2 21       20         Creatinine 1.11       1.41         eGFR >60       46         Eos # 0.22       0.10         Eosinophil % 4.3       2.9         Globulin, Total 2.9       3.5          Glucose 105       90         HDL       33         Hematocrit 46.0       45.8         Hemoglobin 14.5       14.9         HIV Nonreactive               Immature Grans (Abs) 0.01       0.01         Immature Granulocytes 0.2       0.3         INR       1.1         LDL Cholesterol       105.00         Lymph # 0.88       1.05         LYMPH % 17.1       30.2         Magnesium  1.90               MCH 28.2       28.8         MCHC 31.5       32.5         MCV 89.3       88.4         Mono # 0.60       0.23         Mono % 11.7       6.6         MPV 11.3       11.2         Neut # 3.40       2.05         Neut % 66.1       58.9         nRBC 0.0       0.0         Phosphorus Level 2.6               Platelet Count 196       173         POC Anion Gap         16       POC BUN         18       POC Chloride         106       POC Creatinine         1.4       POC Glucose         78       POC Hematocrit         47       POC HEMOGLOBIN         16       POC Ionized Calcium         1.25       Potassium, Blood Gas         3.5       Sodium, Blood Gas         142       POC TCO2 (MEASURED)         25       POCT Glucose   102   99           Potassium 4.1       3.8         PROTEIN TOTAL 6.2       6.9         Protime       13.6         RBC 5.15       5.18         RDW 13.9       13.7         Sample         VENOUS       Sodium 141       139         Syphilis Antibody Nonreactive               Total Cholesterol/HDL Ratio       5         Triglycerides       71         TSH       2.382         Very Low Density Lipoprotein       14         WBC 5.14       3.48                                Significant Imaging: I have reviewed all pertinent imaging results/findings within the past 24 hours.  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     There is a long  segment area of multifocal narrowing in the basilar artery which could represent vaso spasm or areas of multifocal narrowing.     60-70% stenosis in the proximal left internal carotid artery secondary to plaque     No large vessel occlusion seen     A 50% stenosis in the supraclinoid right internal carotid artery     Left thyroid nodules    Plan:    S/p TNK at 12:48 on 12/16    -permissive HTN for now ... SBP less than 180 (until 24 hours is complete)  -q1hr neuro checks  -STAT CTh for any HA or neuro change  -HOB flat, Bedrest, NPO x24 hours post TNK  -repeat CTh after 24 hours to determine if antiplatelet therapy can be initiated     CTA pending. Will update if MT indicated      Discussed CTA results and fluctuating neuro exams with Dr. Rahman who will review imaging  -in the mean time, HOB flat, increase IVF to 125  -continue Q1 neuro checks  -avoid hypotension     -will likely start aspirin if CT head is negative at 24 hours     Further recommendations to follow from MD    VTE Risk Mitigation (From admission, onward)      None            Sofya Ruiz NP  Neurology  [facility] - [facility] ICU        12:17  -discussed with MD  -will hold off on diagnostic angio today with fluctuating symptoms  -keep in ICU for 24 more hours for frequent neurological checks and hemodynamic monitoring  --160 (levophed 1st vasopressor of choice, ok for PICC)  -Q1 hour neuro checks       13:40  -rounded with MD  -will plan for diagnostic angio tomorrow with Dr. Rahman  -Family at bedside will discuss angio  -NPO after midnight for possible angio tomorrow for symptomatic ICA stenosis

## 2023-12-17 NOTE — SUBJECTIVE & OBJECTIVE
Subjective:     Interval History:   Nursing reports fluctuating neuro exams with worsening right sided weakness and improvements throughout the night, 2 episodes of fluctuation overnight, 1 episode this morning and 1 episode yesterday. He does not currently have his hearing aides and is very hard of hearing.     Current Neurological Medications:     Current Facility-Administered Medications   Medication Dose Route Frequency Provider Last Rate Last Admin    0.9%  NaCl infusion   Intravenous Continuous Jessica Padilla  mL/hr at 12/17/23 0943 Rate Change at 12/17/23 0943    clevidipine (CLEVIPREX) 25 mg/50 mL infusion  0-21 mg/hr Intravenous Continuous Praneeth Howard,         hydrALAZINE injection 10 mg  10 mg Intravenous Q6H PRN Praneeth Howard, DO        labetaloL injection 10 mg  10 mg Intravenous Q4H PRN Praneeth Howard, DO        mupirocin 2 % ointment   Nasal BID Jessica Padilla MD           Review of Systems   Neurological:  Positive for speech difficulty and weakness. Negative for dizziness, tremors, seizures, syncope, facial asymmetry, light-headedness, numbness and headaches.     Objective:     Vital Signs (Most Recent):  Temp: 98.1 °F (36.7 °C) (12/17/23 0800)  Pulse: (!) 59 (12/17/23 0914)  Resp: 17 (12/17/23 0914)  BP: (!) 153/83 (12/17/23 0900)  SpO2: 98 % (12/17/23 0914) Vital Signs (24h Range):  Temp:  [97.2 °F (36.2 °C)-98.2 °F (36.8 °C)] 98.1 °F (36.7 °C)  Pulse:  [54-99] 59  Resp:  [10-26] 17  SpO2:  [98 %-100 %] 98 %  BP: (112-166)/() 153/83     Weight: 89.8 kg (198 lb)  Body mass index is 28.41 kg/m².     Physical Exam  Vitals and nursing note reviewed.   Constitutional:       General: He is not in acute distress.     Appearance: He is not toxic-appearing.   HENT:      Head: Normocephalic.      Right Ear: Decreased hearing noted.      Left Ear: Decreased hearing noted.   Eyes:      General: No visual field deficit.     Pupils: Pupils are equal, round, and reactive to light.   Pulmonary:       Effort: Pulmonary effort is normal.   Musculoskeletal:         General: Normal range of motion.      Cervical back: Normal range of motion.      Right lower leg: No edema.      Left lower leg: No edema.   Skin:     General: Skin is warm and dry.      Capillary Refill: Capillary refill takes less than 2 seconds.   Neurological:      Mental Status: He is alert and oriented to person, place, and time.      Cranial Nerves: Dysarthria (very mild dysarthria?) present. No facial asymmetry.      Sensory: No sensory deficit.      Motor: Weakness (4/5 BUE, 3/5 RLE, 4/5 LLE) and pronator drift (mild RUE pronator drift) present. No tremor, atrophy, abnormal muscle tone or seizure activity.          NEUROLOGICAL EXAMINATION:     MENTAL STATUS   Oriented to person, place, and time.     CRANIAL NERVES     CN III, IV, VI   Pupils are equal, round, and reactive to light.      Significant Labs:   Recent Lab Results  (Last 5 results in the past 24 hours)        12/17/23  0118   12/16/23  2027   12/16/23  1638   12/16/23  1307   12/16/23  1243        Albumin/Globulin Ratio 1.1       1.0         Albumin 3.3       3.4         ALP 86       83         ALT 11       11         AST 12       12         Baso # 0.03       0.04         Basophil % 0.6       1.1         BILIRUBIN TOTAL 0.9       0.7         BUN 15.6       16.0         Calcium 9.5       9.9         Chloride 112       111         Cholesterol Total       152         CO2 21       20         Creatinine 1.11       1.41         eGFR >60       46         Eos # 0.22       0.10         Eosinophil % 4.3       2.9         Globulin, Total 2.9       3.5         Glucose 105       90         HDL       33         Hematocrit 46.0       45.8         Hemoglobin 14.5       14.9         HIV Nonreactive               Immature Grans (Abs) 0.01       0.01         Immature Granulocytes 0.2       0.3         INR       1.1         LDL Cholesterol       105.00         Lymph # 0.88       1.05         LYMPH %  17.1       30.2         Magnesium  1.90               MCH 28.2       28.8         MCHC 31.5       32.5         MCV 89.3       88.4         Mono # 0.60       0.23         Mono % 11.7       6.6         MPV 11.3       11.2         Neut # 3.40       2.05         Neut % 66.1       58.9         nRBC 0.0       0.0         Phosphorus Level 2.6               Platelet Count 196       173         POC Anion Gap         16       POC BUN         18       POC Chloride         106       POC Creatinine         1.4       POC Glucose         78       POC Hematocrit         47       POC HEMOGLOBIN         16       POC Ionized Calcium         1.25       Potassium, Blood Gas         3.5       Sodium, Blood Gas         142       POC TCO2 (MEASURED)         25       POCT Glucose   102   99           Potassium 4.1       3.8         PROTEIN TOTAL 6.2       6.9         Protime       13.6         RBC 5.15       5.18         RDW 13.9       13.7         Sample         VENOUS       Sodium 141       139         Syphilis Antibody Nonreactive               Total Cholesterol/HDL Ratio       5         Triglycerides       71         TSH       2.382         Very Low Density Lipoprotein       14         WBC 5.14       3.48                                Significant Imaging: I have reviewed all pertinent imaging results/findings within the past 24 hours.

## 2023-12-17 NOTE — ASSESSMENT & PLAN NOTE
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     There is a long segment area of multifocal narrowing in the basilar artery which could represent vaso spasm or areas of multifocal narrowing.     60-70% stenosis in the proximal left internal carotid artery secondary to plaque     No large vessel occlusion seen     A 50% stenosis in the supraclinoid right internal carotid artery     Left thyroid nodules    Plan:    S/p TNK at 12:48 on 12.16    -permissive HTN for now ... SBP less than 180 (until 24 hours is complete)  -q1hr neuro checks  -STAT CTh for any HA or neuro change  -HOB flat, Bedrest, NPO x24 hours post TNK  -repeat CTh after 24 hours to determine if antiplatelet therapy can be initiated     CTA pending. Will update if MT indicated      Discussed CTA results and fluctuating neuro exams with Dr. Rahman who will review imaging  -in the mean time, HOB flat, increase IVF to 125  -continue Q1 neuro checks  -avoid hypotension

## 2023-12-18 LAB
ALBUMIN SERPL-MCNC: 3.2 G/DL (ref 3.4–4.8)
ALBUMIN/GLOB SERPL: 0.9 RATIO (ref 1.1–2)
ALP SERPL-CCNC: 97 UNIT/L (ref 40–150)
ALT SERPL-CCNC: 12 UNIT/L (ref 0–55)
AST SERPL-CCNC: 18 UNIT/L (ref 5–34)
AV INDEX (PROSTH): 0.51
AV MEAN GRADIENT: 12 MMHG
AV PEAK GRADIENT: 22 MMHG
AV REGURGITATION PRESSURE HALF TIME: 614 MS
AV VALVE AREA BY VELOCITY RATIO: 1.46 CM²
AV VALVE AREA: 1.62 CM²
AV VELOCITY RATIO: 0.46
BASOPHILS # BLD AUTO: 0.03 X10(3)/MCL
BASOPHILS NFR BLD AUTO: 0.4 %
BILIRUB SERPL-MCNC: 0.6 MG/DL
BSA FOR ECHO PROCEDURE: 2.11 M2
BUN SERPL-MCNC: 12.7 MG/DL (ref 8.4–25.7)
CALCIUM SERPL-MCNC: 9.6 MG/DL (ref 8.8–10)
CHLORIDE SERPL-SCNC: 111 MMOL/L (ref 98–111)
CO2 SERPL-SCNC: 20 MMOL/L (ref 23–31)
CREAT SERPL-MCNC: 1.1 MG/DL (ref 0.73–1.18)
CV ECHO LV RWT: 0.65 CM
DOP CALC AO PEAK VEL: 2.33 M/S
DOP CALC AO VTI: 47.8 CM
DOP CALC LVOT AREA: 3.1 CM2
DOP CALC LVOT DIAMETER: 2 CM
DOP CALC LVOT PEAK VEL: 1.08 M/S
DOP CALC LVOT STROKE VOLUME: 77.24 CM3
DOP CALC MV VTI: 35.1 CM
DOP CALCLVOT PEAK VEL VTI: 24.6 CM
E WAVE DECELERATION TIME: 283 MSEC
E/A RATIO: 0.6
E/E' RATIO: 12.17 M/S
ECHO LV POSTERIOR WALL: 1 CM (ref 0.6–1.1)
EOSINOPHIL # BLD AUTO: 0.3 X10(3)/MCL (ref 0–0.9)
EOSINOPHIL NFR BLD AUTO: 4.2 %
ERYTHROCYTE [DISTWIDTH] IN BLOOD BY AUTOMATED COUNT: 13.7 % (ref 11.5–17)
FRACTIONAL SHORTENING: 13 % (ref 28–44)
GFR SERPLBLD CREATININE-BSD FMLA CKD-EPI: >60 MLS/MIN/1.73/M2
GLOBULIN SER-MCNC: 3.6 GM/DL (ref 2.4–3.5)
GLUCOSE SERPL-MCNC: 112 MG/DL (ref 75–121)
HBV CORE IGM SERPL QL IA: NONREACTIVE
HBV SURFACE AG SERPL QL IA: NONREACTIVE
HCT VFR BLD AUTO: 46.7 % (ref 42–52)
HCV AB SERPL QL IA: NONREACTIVE
HGB BLD-MCNC: 15 G/DL (ref 14–18)
IMM GRANULOCYTES # BLD AUTO: 0.02 X10(3)/MCL (ref 0–0.04)
IMM GRANULOCYTES NFR BLD AUTO: 0.3 %
INTERVENTRICULAR SEPTUM: 1.5 CM (ref 0.6–1.1)
LEFT ATRIUM SIZE: 4.7 CM
LEFT INTERNAL DIMENSION IN SYSTOLE: 2.7 CM (ref 2.1–4)
LEFT VENTRICLE DIASTOLIC VOLUME INDEX: 18.22 ML/M2
LEFT VENTRICLE DIASTOLIC VOLUME: 37.9 ML
LEFT VENTRICLE MASS INDEX: 59 G/M2
LEFT VENTRICLE SYSTOLIC VOLUME INDEX: 13 ML/M2
LEFT VENTRICLE SYSTOLIC VOLUME: 27 ML
LEFT VENTRICULAR INTERNAL DIMENSION IN DIASTOLE: 3.1 CM (ref 3.5–6)
LEFT VENTRICULAR MASS: 121.93 G
LV LATERAL E/E' RATIO: 10.43 M/S
LV SEPTAL E/E' RATIO: 14.6 M/S
LVOT MG: 2 MMHG
LVOT MV: 0.72 CM/S
LYMPHOCYTES # BLD AUTO: 1.03 X10(3)/MCL (ref 0.6–4.6)
LYMPHOCYTES NFR BLD AUTO: 14.3 %
MAGNESIUM SERPL-MCNC: 2 MG/DL (ref 1.6–2.6)
MCH RBC QN AUTO: 28.5 PG (ref 27–31)
MCHC RBC AUTO-ENTMCNC: 32.1 G/DL (ref 33–36)
MCV RBC AUTO: 88.8 FL (ref 80–94)
MONOCYTES # BLD AUTO: 0.81 X10(3)/MCL (ref 0.1–1.3)
MONOCYTES NFR BLD AUTO: 11.3 %
MV MEAN GRADIENT: 2 MMHG
MV PEAK A VEL: 1.21 M/S
MV PEAK E VEL: 0.73 M/S
MV PEAK GRADIENT: 7 MMHG
MV STENOSIS PRESSURE HALF TIME: 50 MS
MV VALVE AREA BY CONTINUITY EQUATION: 2.2 CM2
MV VALVE AREA P 1/2 METHOD: 4.4 CM2
NEUTROPHILS # BLD AUTO: 5 X10(3)/MCL (ref 2.1–9.2)
NEUTROPHILS NFR BLD AUTO: 69.5 %
NRBC BLD AUTO-RTO: 0 %
OHS LV EJECTION FRACTION SIMPSONS BIPLANE MOD: 66 %
PHOSPHATE SERPL-MCNC: 2 MG/DL (ref 2.3–4.7)
PISA AR MAX VEL: 4.31 M/S
PLATELET # BLD AUTO: 194 X10(3)/MCL (ref 130–400)
PMV BLD AUTO: 11 FL (ref 7.4–10.4)
POCT GLUCOSE: 113 MG/DL (ref 70–110)
POCT GLUCOSE: 147 MG/DL (ref 70–110)
POTASSIUM SERPL-SCNC: 3.9 MMOL/L (ref 3.5–5.1)
PROT SERPL-MCNC: 6.8 GM/DL (ref 5.8–7.6)
PV PEAK GRADIENT: 4 MMHG
PV PEAK VELOCITY: 1.02 M/S
RBC # BLD AUTO: 5.26 X10(6)/MCL (ref 4.7–6.1)
RIGHT VENTRICULAR END-DIASTOLIC DIMENSION: 2 CM
SODIUM SERPL-SCNC: 140 MMOL/L (ref 132–146)
TDI LATERAL: 0.07 M/S
TDI SEPTAL: 0.05 M/S
TDI: 0.06 M/S
TRICUSPID ANNULAR PLANE SYSTOLIC EXCURSION: 2.21 CM
WBC # SPEC AUTO: 7.19 X10(3)/MCL (ref 4.5–11.5)
Z-SCORE OF LEFT VENTRICULAR DIMENSION IN END DIASTOLE: -7.2
Z-SCORE OF LEFT VENTRICULAR DIMENSION IN END SYSTOLE: -2.9

## 2023-12-18 PROCEDURE — 97162 PT EVAL MOD COMPLEX 30 MIN: CPT

## 2023-12-18 PROCEDURE — 84100 ASSAY OF PHOSPHORUS: CPT

## 2023-12-18 PROCEDURE — 63600175 PHARM REV CODE 636 W HCPCS

## 2023-12-18 PROCEDURE — 25000003 PHARM REV CODE 250: Performed by: INTERNAL MEDICINE

## 2023-12-18 PROCEDURE — 97166 OT EVAL MOD COMPLEX 45 MIN: CPT

## 2023-12-18 PROCEDURE — 80053 COMPREHEN METABOLIC PANEL: CPT

## 2023-12-18 PROCEDURE — 99233 SBSQ HOSP IP/OBS HIGH 50: CPT | Mod: ,,, | Performed by: PSYCHIATRY & NEUROLOGY

## 2023-12-18 PROCEDURE — 11000001 HC ACUTE MED/SURG PRIVATE ROOM

## 2023-12-18 PROCEDURE — 25000003 PHARM REV CODE 250

## 2023-12-18 PROCEDURE — 83735 ASSAY OF MAGNESIUM: CPT

## 2023-12-18 PROCEDURE — 85025 COMPLETE CBC W/AUTO DIFF WBC: CPT

## 2023-12-18 RX ORDER — TAMSULOSIN HYDROCHLORIDE 0.4 MG/1
0.4 CAPSULE ORAL DAILY
Status: DISCONTINUED | OUTPATIENT
Start: 2023-12-19 | End: 2023-12-21 | Stop reason: HOSPADM

## 2023-12-18 RX ORDER — FINASTERIDE 5 MG/1
5 TABLET, FILM COATED ORAL DAILY
Status: DISCONTINUED | OUTPATIENT
Start: 2023-12-19 | End: 2023-12-21 | Stop reason: HOSPADM

## 2023-12-18 RX ORDER — AMLODIPINE BESYLATE 5 MG/1
10 TABLET ORAL DAILY
Status: DISCONTINUED | OUTPATIENT
Start: 2023-12-19 | End: 2023-12-18

## 2023-12-18 RX ORDER — ASPIRIN 81 MG/1
81 TABLET ORAL DAILY
Status: DISCONTINUED | OUTPATIENT
Start: 2023-12-18 | End: 2023-12-21 | Stop reason: HOSPADM

## 2023-12-18 RX ORDER — AMLODIPINE BESYLATE 5 MG/1
5 TABLET ORAL DAILY
Status: DISCONTINUED | OUTPATIENT
Start: 2023-12-19 | End: 2023-12-20

## 2023-12-18 RX ADMIN — ASPIRIN 81 MG: 81 TABLET, COATED ORAL at 08:12

## 2023-12-18 RX ADMIN — SODIUM CHLORIDE: 9 INJECTION, SOLUTION INTRAVENOUS at 02:12

## 2023-12-18 RX ADMIN — HYDRALAZINE HYDROCHLORIDE 10 MG: 20 INJECTION INTRAMUSCULAR; INTRAVENOUS at 02:12

## 2023-12-18 RX ADMIN — SODIUM CHLORIDE: 9 INJECTION, SOLUTION INTRAVENOUS at 09:12

## 2023-12-18 RX ADMIN — MUPIROCIN: 20 OINTMENT TOPICAL at 09:12

## 2023-12-18 RX ADMIN — MUPIROCIN: 20 OINTMENT TOPICAL at 08:12

## 2023-12-18 NOTE — PLAN OF CARE
Problem: Occupational Therapy  Goal: Occupational Therapy Goal  Description: Goals to be met by: 1/18/24     Patient will increase functional independence with ADLs by performing:    UE Dressing with Supervision.  LE Dressing with Supervision.  Grooming while standing at sink with Supervision.  Toileting from toilet with Supervision for hygiene and clothing management.   Toilet transfer to toilet with Supervision.    Outcome: Ongoing, Progressing

## 2023-12-18 NOTE — PT/OT/SLP EVAL
Occupational Therapy  Evaluation    Name: Chichi Mckee  MRN: [id number]  Admitting Diagnosis: slurred speech; RUE/RLE weakness  Recent Surgery: * No surgery found *      Recommendations:     Discharge therapy intensity: High Intensity Therapy   Discharge Equipment Recommendations:  to be determined by next level of care  Barriers to discharge:       Assessment:     Chichi Mckee is a 89 y.o. male with a medical diagnosis of acute ischemic changes in left posterior frontal and temporal lobes s/p TNK 12/16.  He presents with the following performance deficits affecting function: weakness, impaired endurance, impaired self care skills, impaired functional mobility, gait instability, impaired balance, decreased coordination, decreased safety awareness, decreased lower extremity function, decreased upper extremity function.   Pt tolerated initial evaluation well, motivated to participate in therapy. Of note, pt is very hard of hearing and occasionally requires tactile and visual cues. Pt required Min A for bed mobility and Min A for sit>stand at RW. Pt completed functional mobility to hallway and back to bed with Min A using RW, requiring tactile and verbal cues to progress RLE with increased fatigue. Pt required Mod A for lower body dressing and Max A for toilet hygiene in standing to change brief. CGA-Min A for standing balance at RW during toilet hygiene. Recommending high intensity therapy upon discharge for increased endurance and to progress pt towards PLOF.     Rehab Prognosis: Good; patient would benefit from acute skilled OT services to address these deficits and reach maximum level of function.       Plan:     Patient to be seen 5 x/week to address the above listed problems via self-care/home management, therapeutic activities, therapeutic exercises  Plan of Care Expires: 01/18/24  Plan of Care Reviewed with: patient, family    Subjective     Chief Complaint: none  Patient/Family Comments/goals: to get  stronger    Occupational Profile:  Living Environment: lives with wife and daughter in [facility] with ramp entrance; tub shower with seat  Previous level of function: Mod I using rollator for mobility; receives some assist for lower body dressing and bathing  Roles and Routines: ; father  Equipment Used at Home: shower chair  Assistance upon Discharge: family    Pain/Comfort:  Pain Rating 1: 0/10    Patients cultural, spiritual, Baptism conflicts given the current situation: no    Objective:     OT communicated with RN prior to session.      Patient was found HOB elevated with blood pressure cuff, peripheral IV, pulse ox (continuous), SCD, telemetry upon OT entry to room.    General Precautions: Standard, fall, other (see comments) (-160)  Orthopedic Precautions: N/A  Braces: N/A    Vital Signs: Blood Pressure: 144/70  HR: 68  Sp02: 98  Respiratory Status: on room air    Bed Mobility:    Patient completed Supine to Sit with minimum assistance  Patient completed Sit to Supine with minimum assistance    Functional Mobility/Transfers:  Patient completed Sit <> Stand Transfer with minimum assistance with rolling walker   Functional Mobility: CGA initially for mobility using RW, with fatigue pt required Min A and verbal/tactile cues to progress RLE as began to drag slightly    Activities of Daily Living:  Lower Body Dressing: moderate assistance for doffing/donning socks  Toileting: moderate assistance able to perform anterior perineal hygiene in standing at RW     Functional Cognition:  Orientation: oriented to Person, Place, and Time (month, not year)    Visual Perceptual Skills:  Localization: WFL  Fixation: WFL  Pursuits: WFL    Upper Extremity Function:  Right Upper Extremity:   Strength: WFL except digit and wrist extension 3-/5;  strength 3+/5 but able to maintain good  on RW   Coordination: Impaired. Finger opposition  Sensation: WFL    Left Upper Extremity:  Strength: WFL  Coordination:  Impaired. Finger opposition  Sensation: WFL    Balance:   Dynamic sitting balance:Min A  Static standing balance:CGA at RW  Dynamic standing balance:Min A at RW    Therapeutic Positioning  Risk for acquired pressure injuries is decreased due to ability to get to BSC/toilet with assist.    OT interventions performed during the course of today's session:   Therapeutic positioning was provided at the conclusion of session to offload all bony prominences for the prevention and/or reduction of pressure injuries    Skin assessment: all bony prominences were assessed    Findings: no redness or breakdown noted ; mepilex dressing in place at sacrum    OT recommendations for therapeutic positioning throughout hospitalization:   Follow [facility] Pressure Injury Prevention Protocol    Patient Education:  Patient and family were provided with verbal education education regarding OT role/goals/POC, fall prevention, safety awareness, and pressure ulcer prevention.  Understanding was verbalized, however additional teaching warranted.     Patient left right sidelying with all lines intact, call button in reach, and RN notified    GOALS:   Multidisciplinary Problems       Occupational Therapy Goals          Problem: Occupational Therapy    Goal Priority Disciplines Outcome Interventions   Occupational Therapy Goal     OT, PT/OT Ongoing, Progressing    Description: Goals to be met by: 1/18/24     Patient will increase functional independence with ADLs by performing:    UE Dressing with Supervision.  LE Dressing with Supervision.  Grooming while standing at sink with Supervision.  Toileting from toilet with Supervision for hygiene and clothing management.   Toilet transfer to toilet with Supervision.                         History:     Past Medical History:   Diagnosis Date    Hypertension        No past surgical history on file.    Time Tracking:     OT Date of Treatment: 12/18/23  OT Start Time: 1413  OT Stop Time: 1439  OT Total Time  (min): 26 min    Billable Minutes:Evaluation Moderate Complexity    12/18/2023

## 2023-12-18 NOTE — ASSESSMENT & PLAN NOTE
Presented with MARJ JOSEPH, on 112/16/23  RF: Hypertension  Etiology: TBD    Plan:  Not sure if there is anything further to do from a stroke standpoint.   ECHO pending.  Would continue to keep blood pressure at least 130's and avoid hypotension.         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   There is a long segment area of multifocal narrowing in the basilar artery which could represent vaso spasm or areas of multifocal narrowing.   60-70% stenosis in the proximal left internal carotid artery secondary to plaque   No large vessel occlusion seen   A 50% stenosis in the supraclinoid right internal carotid artery   Left thyroid nodules

## 2023-12-18 NOTE — PROGRESS NOTES
[facility] - [facility] ICU  Neurology  Progress Note    Patient Name: Chichi Mckee  MRN: [id number]  Admission Date: 12/16/2023  Hospital Length of Stay: 2 days  Code Status: No Order   Attending Provider: Jessica Padilla MD  Primary Care Physician: No, Primary Doctor   Principal Problem:<principal problem not specified>    HPI:   Chichi Mckee is a 89-year-old male with past medical history of of hypertension who presented as a Code Fast to [facility] ER with complaints of slurred speech, right facial droop, right-sided weakness. LKN 11 am this morning. Family reports, that he was having some dysarthria and ataxia yesterday at noon but symptoms resolved. Symptoms started again but much more severe. NIH 13. CT head negative for bleed or acute infarct. Spoke with Dr. Rahman who recommended TNK. TNK given at 1248. Appears to be possible LVO. CTA pending. Will update once final read is available.     Overview/Hospital Course:  No notes on file        Subjective:     Interval History: RN reports no new issues. He is neurologically unchanged. CT head negative. MRI revealing for patchy acute ischemic changes in the left posterior frontal and temporal lobes. DSA originally planned with Dr. Rahman, but family declined due to advanced age, which is understandable.     Current Neurological Medications:     Current Facility-Administered Medications   Medication Dose Route Frequency Provider Last Rate Last Admin    0.9%  NaCl infusion   Intravenous Continuous Jessica Padilla  mL/hr at 12/18/23 0917 Rate Verify at 12/18/23 0917    aspirin EC tablet 81 mg  81 mg Oral Daily Mariel Ojeda, NP   81 mg at 12/18/23 0845    clevidipine (CLEVIPREX) 25 mg/50 mL infusion  0-21 mg/hr Intravenous Continuous Praneeth Howrad,         hydrALAZINE injection 10 mg  10 mg Intravenous Q6H PRN Praneeth Howard,         labetaloL injection 10 mg  10 mg Intravenous Q4H PRN Praneeth Howard,         mupirocin 2 % ointment   Nasal  Jessica De La Paz MD   Given at 12/18/23 0845    NORepinephrine 8 mg in dextrose 5% 250 mL infusion  0-3 mcg/kg/min Intravenous Continuous Sofya Ruiz NP           Review of Systems  Objective:     Vital Signs (Most Recent):  Temp: 98.6 °F (37 °C) (12/18/23 0800)  Pulse: 68 (12/18/23 0900)  Resp: 17 (12/18/23 0900)  BP: (!) 146/83 (12/18/23 0900)  SpO2: 96 % (12/18/23 0900) Vital Signs (24h Range):  Temp:  [98 °F (36.7 °C)-98.6 °F (37 °C)] 98.6 °F (37 °C)  Pulse:  [55-81] 68  Resp:  [15-26] 17  SpO2:  [95 %-99 %] 96 %  BP: (127-170)/(59-91) 146/83     Weight: 89.8 kg (198 lb)  Body mass index is 28.41 kg/m².     Physical Exam      Vitals and nursing note reviewed.   Constitutional:       General: He is not in acute distress.     Appearance: He is not toxic-appearing.   HENT:      Head: Normocephalic.      Right Ear: Decreased hearing noted.      Left Ear: Decreased hearing noted.   Eyes:      General: No visual field deficit.     Pupils: Pupils are equal, round, and reactive to light.   Pulmonary:      Effort: Pulmonary effort is normal.   Musculoskeletal:         General: Normal range of motion.      Cervical back: Normal range of motion.      Right lower leg: No edema.      Left lower leg: No edema.   Skin:     General: Skin is warm and dry.      Capillary Refill: Capillary refill takes less than 2 seconds.   Neurological:      Mental Status: He is alert and oriented to person, place, and time.      Cranial Nerves: Dysarthria (very mild dysarthria?) present. No facial asymmetry.      Sensory: No sensory deficit.      Motor: Weakness (4/5 BUE, 3/5 RLE, 4/5 LLE) and pronator drift (mild RUE pronator drift) present. No tremor, atrophy, abnormal muscle tone or seizure activity.     Significant Labs: CBC:   Recent Labs   Lab 12/16/23  1307 12/17/23  0118 12/18/23  0255   WBC 3.48* 5.14 7.19   HGB 14.9 14.5 15.0   HCT 45.8 46.0 46.7    196 194     CMP:   Recent Labs   Lab 12/16/23  1307 12/17/23  0119  12/18/23  0255    141 140   K 3.8 4.1 3.9   CO2 20* 21* 20*   BUN 16.0 15.6 12.7   CREATININE 1.41* 1.11 1.10   CALCIUM 9.9 9.5 9.6   MG  --  1.90 2.00   ALBUMIN 3.4 3.3* 3.2*   BILITOT 0.7 0.9 0.6   ALKPHOS 83 86 97   AST 12 12 18   ALT 11 11 12       Significant Imaging: I have reviewed all pertinent imaging results/findings within the past 24 hours.  Assessment and Plan:     Stroke  Presented with RFD, RSW, on 112/16/23  RF: Hypertension  Etiology: TBD    Plan:   Ok to downgrade from ICU.     ECHO pending. Can follow peripherally.   Would continue to keep blood pressure at least 130's and avoid hypotension. No pressors but may not need home Norvasc or at least start at half dose.   Would recommend adding Daily ASA  Add atorvastatin 20 mg daily for RF reduction  Appreciate PT/OT recommendations        Follow up in stroke clinic in 4 weeks, 6 weeks if        If attending inpatient rehab.        Not sure if there is anything further to do from        A stroke stand point. Will sign off.      Stroke workup   -MRI: Patchy acute ischemic changes in the left posterior frontal and temporal lobes  -CT head: negative  CUS: The right internal carotid artery demonstrated less than 50% stenosis.   The left internal carotid artery demonstrated less than 50% stenosis. Stenosis is on upper end of range.  Bilateral vertebral arteries were patent with antegrade flow.   -LDL: 105  -TSH: 2.382  -CTA head and neck: negative for LVO   The left artery is occluded proximally.   There is a long segment area of multifocal narrowing in the basilar artery which could represent vaso spasm or areas of multifocal narrowing.   60-70% stenosis in the proximal left internal carotid artery secondary to plaque   No large vessel occlusion seen   A 50% stenosis in the supraclinoid right internal carotid artery   Left thyroid nodules                    VTE Risk Mitigation (From admission, onward)      None            Mariel Ojeda  NP  Neurology  [facility] [place] [facility] [address] [place] [address]

## 2023-12-18 NOTE — PT/OT/SLP EVAL
Physical Therapy Evaluation    Patient Name:  Chichi Mckee   MRN:  [id number]    Recommendations:     Discharge therapy intensity: High Intensity Therapy   Discharge Equipment Recommendations: to be determined by next level of care   Barriers to discharge: Impaired mobility and Ongoing medical needs    Assessment:     Chichi Mckee is a 89 y.o. male admitted with a medical diagnosis of acute ischemic changes in L posterior frontal and temporal lobes, s/p TNK on 12/16.  He presents with the following impairments/functional limitations: weakness, impaired endurance, impaired functional mobility, gait instability, decreased upper extremity function, decreased safety awareness. Pt is A&O, pleasantly agreeable to PT but very [place]. Pt was Min A for bed mobility and sit<> stand. Ambulated with RW ~25 feet, needed Min A towards end of ambulation as right side started to fatigue. Pt would benefit from high intensity therapy after acute d/c.     Rehab Prognosis: Good; patient would benefit from acute skilled PT services to address these deficits and reach maximum level of function.    Recent Surgery: * No surgery found *      Plan:     During this hospitalization, patient to be seen 6 x/week to address the identified rehab impairments via gait training, therapeutic activities, therapeutic exercises, neuromuscular re-education and progress toward the following goals:    Plan of Care Expires:  01/18/24    Subjective     Chief Complaint:   Patient/Family Comments/goals: to regain strength   Pain/Comfort:  Pain Rating 1: 0/10    Patients cultural, spiritual, Scientologist conflicts given the current situation: no    Living Environment:  Pt lives in [facility] with wife and daughter with a ramp to enter. Family helps him with bathing/dressing but otherwise he was mobile and active with rollator.   Prior to admission, patients level of function was Min A.  Equipment used at home: shower chair, rollator.  DME owned (not currently used):  single point cane.  Upon discharge, patient will have assistance from family.    Objective:     Communicated with nurse prior to session.  Patient found supine with blood pressure cuff, telemetry, pulse ox (continuous), SCD  upon PT entry to room.    General Precautions: Standard, fall, -160  Orthopedic Precautions:N/A   Braces: N/A  Respiratory Status: Room air  Blood Pressure: 144/70; NSG just administered meds      Exams:  Cognitive Exam:  Patient is oriented to Person, Place, and Situation  RLE ROM: WFL except limited knee extension and hip ER/IR  RLE Strength: WFL  LLE ROM: WFL except limited knee extension and hip ER/IR  LLE Strength: WFL  Skin integrity: Visible skin intact; dressing on sacrum       Functional Mobility:  Bed Mobility:     Supine to Sit: minimum assistance  Sit to Supine: moderate assistance  Transfers:     Sit to Stand:  minimum assistance with rolling walker  Gait: Pt ambulated ~25 feet with RW; started off with CGA, slow steady short steps. Pts right side quickly fatigued and needed Min A, cues for progressing RLE, and for good midline trunk control.       AM-PAC 6 CLICK MOBILITY  Total Score:17       Treatment & Education:    Patient and daughter/s were provided with verbal education education regarding PT role/goals/POC, fall prevention, safety awareness, and pressure ulcer prevention.  Understanding was verbalized.     Patient left right sidelying with all lines intact, call button in reach, nurse notified, and daughter present.    GOALS:   Multidisciplinary Problems       Physical Therapy Goals          Problem: Physical Therapy    Goal Priority Disciplines Outcome Goal Variances Interventions   Physical Therapy Goal     PT, PT/OT Ongoing, Progressing     Description: Goals to be met by: 2024     Patient will increase functional independence with mobility by performin. Supine to sit with Stand-by Assistance  2. Sit to stand transfer with Stand-by Assistance  3. Bed to  chair transfer with Modified [place] using Rolling Walker  4. Gait  x 150 feet with Modified [place] using Rolling Walker.                          History:     Past Medical History:   Diagnosis Date    Hypertension        No past surgical history on file.    Time Tracking:     PT Received On: 12/18/23  PT Start Time: 1412     PT Stop Time: 1439  PT Total Time (min): 27 min     Billable Minutes: Evaluation 27      12/18/2023

## 2023-12-18 NOTE — PLAN OF CARE
Problem: Physical Therapy  Goal: Physical Therapy Goal  Description: Goals to be met by: 2024     Patient will increase functional independence with mobility by performin. Supine to sit with Stand-by Assistance  2. Sit to stand transfer with Stand-by Assistance  3. Bed to chair transfer with Modified [place] using Rolling Walker  4. Gait  x 150 feet with Modified [place] using Rolling Walker.     Outcome: Ongoing, Progressing

## 2023-12-18 NOTE — PROGRESS NOTES
[facility] - [facility] ICU  Pulmonary Critical Care Note    Patient Name: Chichi Mckee  MRN: [id number]  Admission Date: 12/16/2023  Hospital Length of Stay: 2 days  Code Status: No Order  Attending Provider: Jessica Padilla MD  Primary Care Provider: Bia, Primary Doctor     Subjective:     HPI:   Patient is a 89-year-old male with past medical history of BPH, hypertension, peripheral arterial disease, who presented to [facility] ED on 12/16/2023 via EMS for stroke like symptoms.  Patient reported to have slurred speech starting afternoon which improved over the course of night.  Patient then started having speech stuttering and right arm weakness and numbness in the morning improved by 9:00 a.m. but then again started around 11:00 a.m. code fast activated in the ED. CT head with no acute intracranial abnormalities but findings of chronic muscular vascular ischemic changes.  Decision was made to administer TN K after discussion with Neurology.  Initial NIH reported to be 13 per Neurology notes.     Initial vitals blood pressure 162/90, heart rate 99, respiratory rate 18, afebrile 97° F, SpO2 98% on room air.  Labs with leukopenia 3.48, H and H 14.9/45.8, platelets 173.  PT INR 13.6/1.1, CMP with sodium 139, potassium 3.8, bicarb 20, BUN/creatinine 16/1.41.     Hospital Course/Significant events:  12/16-TNKase administered in the ED and admitted to ICU     24 Hour Interval History:  No acute events overnight. Afebrile and HD stable.   Lab work:  WBCs 5.15, H&H 15/46 platelets 194.  Non anion gap metabolic acidosis with sodium 140, chloride 111, bicarb 20, BRIAN improving with BUN creatinine 12.7/1.10.   Family has refused further workup of potential carotid stenosis  CT head at 12:00 p.m. was negative.  Awaiting MRI results.      Past Medical History:   Diagnosis Date    Hypertension        No past surgical history on file.    Social History     Socioeconomic History    Marital status:    Tobacco Use    Smoking  status: Never    Smokeless tobacco: Never   Substance and Sexual Activity    Alcohol use: Yes    Drug use: Never           Current Outpatient Medications   Medication Instructions    amLODIPine (NORVASC) 10 mg, Oral, Daily    finasteride (PROSCAR) 5 mg, Oral    tamsulosin (FLOMAX) 0.4 mg, Oral       Current Inpatient Medications   aspirin  81 mg Oral Daily    mupirocin   Nasal BID       Current Intravenous Infusions   sodium chloride 0.9% 125 mL/hr at 12/18/23 0619    clevidipine      NORepinephrine bitartrate-D5W           Review of Systems   Unable to perform ROS: Age          Objective:       Intake/Output Summary (Last 24 hours) at 12/18/2023 0711  Last data filed at 12/18/2023 0619  Gross per 24 hour   Intake 2714.26 ml   Output 500 ml   Net 2214.26 ml           Vital Signs (Most Recent):  Temp: 98.2 °F (36.8 °C) (12/18/23 0400)  Pulse: 63 (12/18/23 0600)  Resp: (!) 24 (12/18/23 0600)  BP: (!) 163/86 (12/18/23 0600)  SpO2: 96 % (12/18/23 0600)  Body mass index is 28.41 kg/m².  Weight: 89.8 kg (198 lb) Vital Signs (24h Range):  Temp:  [98 °F (36.7 °C)-98.2 °F (36.8 °C)] 98.2 °F (36.8 °C)  Pulse:  [55-81] 63  Resp:  [15-26] 24  SpO2:  [95 %-100 %] 96 %  BP: (127-170)/(59-91) 163/86     Physical Exam  Vitals reviewed.   Constitutional:       Appearance: Normal appearance. He is not ill-appearing.   HENT:      Head: Normocephalic and atraumatic.      Mouth/Throat:      Mouth: Mucous membranes are moist.      Pharynx: Oropharynx is clear.   Eyes:      Extraocular Movements: Extraocular movements intact.      Conjunctiva/sclera: Conjunctivae normal.      Pupils: Pupils are equal, round, and reactive to light.   Cardiovascular:      Rate and Rhythm: Normal rate and regular rhythm.   Pulmonary:      Effort: Pulmonary effort is normal. No respiratory distress.      Breath sounds: Normal breath sounds.   Abdominal:      General: Bowel sounds are normal. There is no distension.      Palpations: Abdomen is soft. There is  "no mass.   Musculoskeletal:      Cervical back: Normal range of motion and neck supple.      Right lower leg: No edema.      Left lower leg: No edema.   Skin:     General: Skin is warm and dry.   Neurological:      Mental Status: He is alert.      Comments: Patient hard of hearing limiting neuro exam.  Strength equal in bilateral lower extremities 5/5.  Mild weakness in right upper extremity 4/5, 5/5 strength in left upper extremity.           Lines/Drains/Airways       Drain  Duration             Male External Urinary Catheter 12/16/23 1430 1 day              Peripheral Intravenous Line  Duration                  Peripheral IV - Single Lumen 12/16/23 18 G Left Antecubital 2 days         Peripheral IV - Single Lumen 12/17/23 1307 20 G Anterior;Distal;Left Forearm <1 day                    Significant Labs:    Lab Results   Component Value Date    WBC 7.19 12/18/2023    HGB 15.0 12/18/2023    HCT 46.7 12/18/2023    MCV 88.8 12/18/2023     12/18/2023           BMP  Lab Results   Component Value Date     12/18/2023    K 3.9 12/18/2023    CHLORIDE 111 12/18/2023    CO2 20 (L) 12/18/2023    BUN 12.7 12/18/2023    CREATININE 1.10 12/18/2023    CALCIUM 9.6 12/18/2023    EGFRNONAA 56 03/26/2019         ABG  No results for input(s): "PH", "PO2", "PCO2", "HCO3", "POCBASEDEF" in the last 168 hours.    Mechanical Ventilation Support:         Significant Imaging:  I have reviewed the pertinent imaging within the past 24 hours.        Assessment/Plan:     Assessment  Right facial droop, right-sided weakness, slurred speech symptoms status post TNKase 12/16  BRIAN - improving  Artery stenosis security 60-70% stenosis of proximal L internal carotid artery and 50% stenosis in supraclinoid R internal carotid artery  Mild-to-moderate aortic stenosis per TTE 12/16/2023  Past medical history of hypertension, hyperlipidemia, PAD, moderate aortic stenosis, BPH, bladder mass      Plan  Admit to ICU for close monitoring  s/p " TNKase, holding all antiplatelet/anticoagulation therapy for initial 24 hours  NIHSS to be performed 1hr, 24hr, and 7 days or at discharge s/p TNKase  Repeat head CT 24hrs s/p TNKase with no acute abnormalities  Vital signs & Neuro checks:  Going to Q 2/hour  Lipitor 80 mg daily  Head of Bed flat for 24 hours s/p TNKase   Echo w/ bubble 12/16/23 - prelim report - EF 55 to 60%, negative bubble study, mild to moderate aortic stenosis with aortic valve peak velocity 2.3 m/s, dimensionless index 0.51, Aortic valve area by VTI 1.62 cm²   CTA H&N 12/16/23 - The left artery is occluded proximally. There is a long segment area of multifocal narrowing in the basilar artery which could represent vaso spasm or areas of multifocal narrowing. 60-70% stenosis in the proximal left internal carotid artery secondary to plaque. No large vessel occlusion seen. A 50% stenosis in the supraclinoid right internal carotid artery. Left thyroid nodules  MRI brain pending results  PT/OT consult   continue critical care monitoring        DVT Prophylaxis: SCDs  GI Prophylaxis: famotidine     Yared Phillips DO  Pulmonary Critical Care Medicine  [facility] - [facility] ICU  DOS: 12/18/2023

## 2023-12-18 NOTE — SUBJECTIVE & OBJECTIVE
Subjective:     Interval History: RN reports no new issues. He is neurologically unchanged. CT head negative. MRI revealing for patchy acute ischemic changes in the left posterior frontal and temporal lobes. DSA originally planned with Dr. Rahman, but family declined due to advanced age, which is understandable.     Current Neurological Medications:     Current Facility-Administered Medications   Medication Dose Route Frequency Provider Last Rate Last Admin    0.9%  NaCl infusion   Intravenous Continuous Jessica Padilla  mL/hr at 12/18/23 0917 Rate Verify at 12/18/23 0917    aspirin EC tablet 81 mg  81 mg Oral Daily Mariel Ojeda NP   81 mg at 12/18/23 0845    clevidipine (CLEVIPREX) 25 mg/50 mL infusion  0-21 mg/hr Intravenous Continuous Praneeth Howard,         hydrALAZINE injection 10 mg  10 mg Intravenous Q6H PRN Praneeth Howard,         labetaloL injection 10 mg  10 mg Intravenous Q4H PRN Praneeth Howard,         mupirocin 2 % ointment   Nasal BID Jessica Padilla MD   Given at 12/18/23 0845    NORepinephrine 8 mg in dextrose 5% 250 mL infusion  0-3 mcg/kg/min Intravenous Continuous Sofya Ruiz NP           Review of Systems  Objective:     Vital Signs (Most Recent):  Temp: 98.6 °F (37 °C) (12/18/23 0800)  Pulse: 68 (12/18/23 0900)  Resp: 17 (12/18/23 0900)  BP: (!) 146/83 (12/18/23 0900)  SpO2: 96 % (12/18/23 0900) Vital Signs (24h Range):  Temp:  [98 °F (36.7 °C)-98.6 °F (37 °C)] 98.6 °F (37 °C)  Pulse:  [55-81] 68  Resp:  [15-26] 17  SpO2:  [95 %-99 %] 96 %  BP: (127-170)/(59-91) 146/83     Weight: 89.8 kg (198 lb)  Body mass index is 28.41 kg/m².     Physical Exam      Vitals and nursing note reviewed.   Constitutional:       General: He is not in acute distress.     Appearance: He is not toxic-appearing.   HENT:      Head: Normocephalic.      Right Ear: Decreased hearing noted.      Left Ear: Decreased hearing noted.   Eyes:      General: No visual field deficit.     Pupils: Pupils  are equal, round, and reactive to light.   Pulmonary:      Effort: Pulmonary effort is normal.   Musculoskeletal:         General: Normal range of motion.      Cervical back: Normal range of motion.      Right lower leg: No edema.      Left lower leg: No edema.   Skin:     General: Skin is warm and dry.      Capillary Refill: Capillary refill takes less than 2 seconds.   Neurological:      Mental Status: He is alert and oriented to person, place, and time.      Cranial Nerves: Dysarthria (very mild dysarthria?) present. No facial asymmetry.      Sensory: No sensory deficit.      Motor: Weakness (4/5 BUE, 3/5 RLE, 4/5 LLE) and pronator drift (mild RUE pronator drift) present. No tremor, atrophy, abnormal muscle tone or seizure activity.     Significant Labs: CBC:   Recent Labs   Lab 12/16/23  1307 12/17/23  0118 12/18/23  0255   WBC 3.48* 5.14 7.19   HGB 14.9 14.5 15.0   HCT 45.8 46.0 46.7    196 194     CMP:   Recent Labs   Lab 12/16/23  1307 12/17/23  0118 12/18/23  0255    141 140   K 3.8 4.1 3.9   CO2 20* 21* 20*   BUN 16.0 15.6 12.7   CREATININE 1.41* 1.11 1.10   CALCIUM 9.9 9.5 9.6   MG  --  1.90 2.00   ALBUMIN 3.4 3.3* 3.2*   BILITOT 0.7 0.9 0.6   ALKPHOS 83 86 97   AST 12 12 18   ALT 11 11 12       Significant Imaging: I have reviewed all pertinent imaging results/findings within the past 24 hours.

## 2023-12-19 LAB
ALBUMIN SERPL-MCNC: 3 G/DL (ref 3.4–4.8)
ALBUMIN/GLOB SERPL: 0.9 RATIO (ref 1.1–2)
ALP SERPL-CCNC: 84 UNIT/L (ref 40–150)
ALT SERPL-CCNC: 11 UNIT/L (ref 0–55)
AST SERPL-CCNC: 16 UNIT/L (ref 5–34)
BASOPHILS # BLD AUTO: 0.03 X10(3)/MCL
BASOPHILS NFR BLD AUTO: 0.4 %
BILIRUB SERPL-MCNC: 0.8 MG/DL
BUN SERPL-MCNC: 11.3 MG/DL (ref 8.4–25.7)
CALCIUM SERPL-MCNC: 9.1 MG/DL (ref 8.8–10)
CHLORIDE SERPL-SCNC: 113 MMOL/L (ref 98–111)
CO2 SERPL-SCNC: 19 MMOL/L (ref 23–31)
CREAT SERPL-MCNC: 1.1 MG/DL (ref 0.73–1.18)
EOSINOPHIL # BLD AUTO: 0.11 X10(3)/MCL (ref 0–0.9)
EOSINOPHIL NFR BLD AUTO: 1.5 %
ERYTHROCYTE [DISTWIDTH] IN BLOOD BY AUTOMATED COUNT: 13.9 % (ref 11.5–17)
GFR SERPLBLD CREATININE-BSD FMLA CKD-EPI: >60 MLS/MIN/1.73/M2
GLOBULIN SER-MCNC: 3.4 GM/DL (ref 2.4–3.5)
GLUCOSE SERPL-MCNC: 115 MG/DL (ref 75–121)
HCT VFR BLD AUTO: 44.2 % (ref 42–52)
HGB BLD-MCNC: 14.3 G/DL (ref 14–18)
IMM GRANULOCYTES # BLD AUTO: 0.03 X10(3)/MCL (ref 0–0.04)
IMM GRANULOCYTES NFR BLD AUTO: 0.4 %
LYMPHOCYTES # BLD AUTO: 0.91 X10(3)/MCL (ref 0.6–4.6)
LYMPHOCYTES NFR BLD AUTO: 12.6 %
MAGNESIUM SERPL-MCNC: 1.8 MG/DL (ref 1.6–2.6)
MCH RBC QN AUTO: 28.5 PG (ref 27–31)
MCHC RBC AUTO-ENTMCNC: 32.4 G/DL (ref 33–36)
MCV RBC AUTO: 88 FL (ref 80–94)
MONOCYTES # BLD AUTO: 1.1 X10(3)/MCL (ref 0.1–1.3)
MONOCYTES NFR BLD AUTO: 15.3 %
NEUTROPHILS # BLD AUTO: 5.02 X10(3)/MCL (ref 2.1–9.2)
NEUTROPHILS NFR BLD AUTO: 69.8 %
NRBC BLD AUTO-RTO: 0 %
PHOSPHATE SERPL-MCNC: 2.3 MG/DL (ref 2.3–4.7)
PLATELET # BLD AUTO: 182 X10(3)/MCL (ref 130–400)
PLATELETS.RETICULATED NFR BLD AUTO: 5.3 % (ref 0.9–11.2)
PMV BLD AUTO: 11.3 FL (ref 7.4–10.4)
POTASSIUM SERPL-SCNC: 3.7 MMOL/L (ref 3.5–5.1)
PROT SERPL-MCNC: 6.4 GM/DL (ref 5.8–7.6)
RBC # BLD AUTO: 5.02 X10(6)/MCL (ref 4.7–6.1)
SODIUM SERPL-SCNC: 138 MMOL/L (ref 132–146)
WBC # SPEC AUTO: 7.2 X10(3)/MCL (ref 4.5–11.5)

## 2023-12-19 PROCEDURE — 83735 ASSAY OF MAGNESIUM: CPT | Performed by: NURSE PRACTITIONER

## 2023-12-19 PROCEDURE — 84100 ASSAY OF PHOSPHORUS: CPT | Performed by: NURSE PRACTITIONER

## 2023-12-19 PROCEDURE — 25000003 PHARM REV CODE 250

## 2023-12-19 PROCEDURE — 85025 COMPLETE CBC W/AUTO DIFF WBC: CPT | Performed by: NURSE PRACTITIONER

## 2023-12-19 PROCEDURE — 80053 COMPREHEN METABOLIC PANEL: CPT | Performed by: NURSE PRACTITIONER

## 2023-12-19 PROCEDURE — 11000001 HC ACUTE MED/SURG PRIVATE ROOM

## 2023-12-19 PROCEDURE — 97530 THERAPEUTIC ACTIVITIES: CPT

## 2023-12-19 PROCEDURE — 25000003 PHARM REV CODE 250: Performed by: NURSE PRACTITIONER

## 2023-12-19 RX ORDER — ATORVASTATIN CALCIUM 10 MG/1
20 TABLET, FILM COATED ORAL DAILY
Status: DISCONTINUED | OUTPATIENT
Start: 2023-12-19 | End: 2023-12-21 | Stop reason: HOSPADM

## 2023-12-19 RX ADMIN — SODIUM CHLORIDE: 9 INJECTION, SOLUTION INTRAVENOUS at 08:12

## 2023-12-19 RX ADMIN — FINASTERIDE 5 MG: 5 TABLET, FILM COATED ORAL at 08:12

## 2023-12-19 RX ADMIN — MUPIROCIN: 20 OINTMENT TOPICAL at 08:12

## 2023-12-19 RX ADMIN — ASPIRIN 81 MG: 81 TABLET, COATED ORAL at 08:12

## 2023-12-19 RX ADMIN — TAMSULOSIN HYDROCHLORIDE 0.4 MG: 0.4 CAPSULE ORAL at 08:12

## 2023-12-19 RX ADMIN — AMLODIPINE BESYLATE 5 MG: 5 TABLET ORAL at 08:12

## 2023-12-19 RX ADMIN — ATORVASTATIN CALCIUM 20 MG: 10 TABLET, FILM COATED ORAL at 08:12

## 2023-12-19 NOTE — PLAN OF CARE
12/19/23 1526   Discharge Assessment   Assessment Type Discharge Planning Assessment   Confirmed/corrected address, phone number and insurance Yes   Confirmed Demographics Correct on Facesheet   Source of Information family   Communicated RAMAN with patient/caregiver Date not available/Unable to determine   Reason For Admission Stroke   People in Home spouse;child(anai), adult   Do you expect to return to your current living situation? Yes   Do you have help at home or someone to help you manage your care at home? Yes   Who are your caregiver(s) and their phone number(s)? Awa (daughter) [phone]   Prior to hospitilization cognitive status: Unable to Assess   Current cognitive status: Unable to Assess   Walking or Climbing Stairs Difficulty yes   Walking or Climbing Stairs ambulation difficulty, requires equipment   Mobility Management rollator   Dressing/Bathing Difficulty yes   Dressing/Bathing bathing difficulty, requires equipment;bathing difficulty, assistance 1 person;dressing difficulty, assistance 1 person   Dressing/Bathing Management Uses a shower chair, requires assistance from his daughter with dressing and bathing.   Home Accessibility wheelchair accessible   Home Layout Able to live on 1st floor   Equipment Currently Used at Home shower chair;rollator   Do you currently have service(s) that help you manage your care at home? No   Do you take prescription medications? Yes   Do you have prescription coverage? Yes   Coverage Medicare   Who is going to help you get home at discharge? Family   How do you get to doctors appointments? family or friend will provide   Are you on dialysis? No   Do you take coumadin? No   Discharge Plan A Rehab   Discharge Plan B Rehab   DME Needed Upon Discharge  other (see comments)  (TBD)   Discharge Plan discussed with: Adult children   Transition of Care Barriers None   OTHER   Name(s) of People in Home Awa Mckee     Patient lives with spouse and adult daughter  (Awa). Patient had home health in the past, family unsure of which agency. [facility] POA is his son Sander Mkcee. Rehab choice list left with daughter at bedside, stated she wanted the POA to make the decision. Instructed them to call CM with number provided on paperwork.

## 2023-12-19 NOTE — PT/OT/SLP PROGRESS
Physical Therapy Treatment    Patient Name:  Chichi Mckee   MRN:  [id number]    Recommendations:     Discharge therapy intensity: High Intensity Therapy   Discharge Equipment Recommendations: to be determined by next level of care  Barriers to discharge: Impaired mobility    Assessment:     Chichi Mckee is a 89 y.o. male admitted with a medical diagnosis of CVA s/p TNK. He presents with the following impairments/functional limitations: weakness, impaired endurance, impaired functional mobility, gait instability, impaired balance, decreased safety awareness. Patient progressing well towards functional PT goals, continues to require modA for sit<>stand and modA for gait with RW. Patient limited by poor safety awareness and slight RUE weakness affecting safety with RW. PT to progress as appropriate.     Rehab Prognosis: Good; patient would benefit from acute skilled PT services to address these deficits and reach maximum level of function.    Recent Surgery: * No surgery found *      Plan:     During this hospitalization, patient to be seen 6 x/week to address the identified rehab impairments via gait training, therapeutic activities, therapeutic exercises, neuromuscular re-education and progress toward the following goals:    Plan of Care Expires:  01/31/24    Subjective     Chief Complaint: none stated  Patient/Family Comments/goals: to get stronger  Pain/Comfort:  Pain Rating 1: 0/10      Objective:     Communicated with RN prior to session.  Patient found up in chair with blood pressure cuff, peripheral IV, pulse ox (continuous), telemetry upon PT entry to room.     General Precautions: Standard, fall  Orthopedic Precautions: N/A  Braces: N/A  Respiratory Status: Room air  Blood Pressure: 148/74, HR: 71  Skin Integrity: Visible skin intact    Therapeutic Activities/Exercises:  Patient up in chair upon arrival, agreeable to PT session. Pt required modA for sit<>stand with RW, poor initiation of movement. Once in  standing pt with posterior lean, modA to correct. Pt ambulated 65ft x2 with seated rest between trials. He required modA for gait with RW, hand over hand assist with RUE, step-to gait pattern with decreased gait speed.     Education:  Patient provided with verbal education education regarding PT role/goals/POC, fall prevention, safety awareness, and discharge/DME recommendations.  Understanding was verbalized, however additional teaching warranted.     Patient left up in chair with all lines intact, call button in reach, and RN present..    GOALS:   Multidisciplinary Problems       Physical Therapy Goals          Problem: Physical Therapy    Goal Priority Disciplines Outcome Goal Variances Interventions   Physical Therapy Goal     PT, PT/OT Ongoing, Progressing     Description: Goals to be met by: 2024     Patient will increase functional independence with mobility by performin. Supine to sit with Stand-by Assistance  2. Sit to stand transfer with Stand-by Assistance  3. Bed to chair transfer with Modified [place] using Rolling Walker  4. Gait  x 150 feet with Modified [place] using Rolling Walker.                          Time Tracking:     PT Received On: 23  PT Start Time: 1105     PT Stop Time: 1125  PT Total Time (min): 20 min     Billable Minutes: Therapeutic Activity 20min    Treatment Type: Treatment  PT/PTA: PT     Number of PTA visits since last PT visit: 1     2023

## 2023-12-19 NOTE — H&P
[facility] Hospital Medicine - H&P Note    Patient Name: Chichi Mckee  : 1929  MRN: [id number]  PCP: Bia, Primary Doctor  Admitting Physician: Navid Damon MD  Admission Class: IP- Inpatient   Length of Stay: 2  Face-to-Face encounter date: 2023  Code status: --Full    Chief Complaint   Slurred speech, right sided weakness    History of Present Illness   Mr. Mckee is a 89 year old male with a pmh of HTN, HLD, PAD, aortic stenosis, bladder mass, and BPH who presented to the ED as a Code Fast on 2023 with complaints of slurred speech and RUQ E weakness and numbness starting at 11:00 a.m. that morning.  CT head was negative.  Lab work revealed BRIAN.  He was given TNK at 12:48 p.m. and admitted to ICU. 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 There is a long segment area of multifocal narrowing in the basilar artery which could represent vasospasm or areas of multifocal narrowing.  60-70% stenosis in the proximal left internal carotid artery secondary to plaque.  No large vessel occlusion is seen.  Prelim echo showed EF 55-60%, negative bubble study.  24 hr repeat CT head showed no acute intracranial hemorrhage.  MRI brain without contrast showed patchy acute ischemic changes in the left posterior frontal and temporal lobes.  Moderate chronic microvascular ischemic changes. Patient kept in ICU additional 24 hours for frequent neuro checks. Patient originally going to undergo DSA, but family decided against it, due to patient's age. VS have remained stable, BRIAN resolved.  The patient was deemed suitable for transfer to the floor. Care was transferred to hospital medicine for further management.      ROS   Except as documented, all other systems reviewed and negative     Past Medical History     Hypertension  Hyperlipidemia  PAD  Moderate aortic stenosis  Bladder mass  BPH    Past Surgical History     Denies previous surgery    Social  History     Social History     Tobacco Use    Smoking status: Never    Smokeless tobacco: Never   Substance Use Topics    Alcohol use: Yes        Family History   Reviewed and negative    Allergies   Hydrocodone-acetaminophen    Home Medications     Prior to Admission medications    Medication Sig Start Date End Date Taking? Authorizing Provider   amLODIPine (NORVASC) 10 MG tablet Take 10 mg by mouth once daily.   Yes Provider, Historical   finasteride (PROSCAR) 5 mg tablet Take 5 mg by mouth.   Yes Provider, Historical   tamsulosin (FLOMAX) 0.4 mg Cap Take 0.4 mg by mouth.   Yes Provider, Historical        Physical Exam   Vital Signs  Temp:  [98.5 °F (36.9 °C)-98.6 °F (37 °C)]   Pulse:  [64-80]   Resp:  [15-29]   BP: (121-191)/()   SpO2:  [94 %-99 %]    General: Well developed, well nourished. In no acute distress, non-toxic appearing  HEENT: NC/AT. Very hard of hearing  Neck:  Supple. No JVD  Chest: Clear bilaterally, no wheezing, no accessory muscle use.  CVS: Regular rhythm. Normal S1/S2.  Abdomen: nondistended, normoactive BS, soft and non-tender.  MSK: No obvious deformity or joint swelling  Skin: Warm and dry  Neuro: AAOx3, Right pronator drift, RUE mild weakness.  Psych: Cooperative      Labs     Recent Labs     12/17/23  0118 12/18/23  0255   WBC 5.14 7.19   RBC 5.15 5.26   HGB 14.5 15.0   HCT 46.0 46.7   MCV 89.3 88.8   MCH 28.2 28.5   MCHC 31.5* 32.1*   RDW 13.9 13.7    194     Recent Labs     12/16/23  1307   PROTIME 13.6   INR 1.1      Recent Labs     12/16/23  1307 12/17/23  0118 12/18/23  0255    141 140   K 3.8 4.1 3.9   CHLORIDE 111 112* 111   CO2 20* 21* 20*   BUN 16.0 15.6 12.7   CREATININE 1.41* 1.11 1.10   EGFRNORACEVR 46 >60 >60   GLUCOSE 90 105 112   CALCIUM 9.9 9.5 9.6   MG  --  1.90 2.00   PHOS  --  2.6 2.0*   ALBUMIN 3.4 3.3* 3.2*   GLOBULIN 3.5 2.9 3.6*   ALKPHOS 83 86 97   ALT 11 11 12   AST 12 12 18   BILITOT 0.7 0.9 0.6   CHOL 152  --   --    TRIG 71  --   --    LDL  "105.00  --   --    HDL 33*  --   --    TSH 2.382  --   --      No results for input(s): "LACTIC" in the last 72 hours.  No results for input(s): "CPK", "TROPONINI" in the last 72 hours.     Microbiology Results (last 7 days)       ** No results found for the last 168 hours. **           Imaging     MRI Brain Without Contrast   Final Result      1. Patchy acute ischemic changes in the left posterior frontal and temporal lobes.   2. Moderate chronic microvascular ischemic changes.         Electronically signed by: Margaret Shay   Date:    12/18/2023   Time:    10:28      CT Head Without Contrast   Final Result      No acute intracranial hemorrhage.         Electronically signed by: Ronnie Santacruz   Date:    12/17/2023   Time:    12:56      CT Head Without Contrast   Final Result      Chronic age-related changes.  No change since prior examination      Old appearing area of ischemia in the left posterior parietal region at the cerebral convexity         Electronically signed by: Eleuterio Bradley   Date:    12/16/2023   Time:    16:29      CTA STROKE MULTI-PHASE   Final Result   Addendum (preliminary) 1 of 1      The first impression should read as follows.      The left vertebral artery is occluded proximally         Electronically signed by: Eleuterio Bradley   Date:    12/16/2023   Time:    16:30      Final         The left artery is occluded proximally.      There is a long segment area of multifocal narrowing in the basilar artery which could represent vaso spasm or areas of multifocal narrowing.      60-70% stenosis in the proximal left internal carotid artery secondary to plaque      No large vessel occlusion seen      A 50% stenosis in the supraclinoid right internal carotid artery      Left thyroid nodules      This report was flagged in Epic as abnormal.         Electronically signed by: Eleuterio Bradley   Date:    12/16/2023   Time:    13:12      CT HEAD FOR STROKE   Final Result      No acute intracranial " abnormality identified.  Findings of chronic microvascular ischemic disease.      Findings reported to Prashant prior to interpretation.         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 Damon.  Please see addendum for further assessment and plan from attending MD.

## 2023-12-19 NOTE — PROGRESS NOTES
[facility] Hospital Medicine Progress Note        Chief Complaint: Inpatient Follow-up for acute stroke     HPI: Mr. Mckee is a 89 year old male with a pmh of HTN, HLD, PAD, aortic stenosis, bladder mass, and BPH who presented to the ED as a Code Fast on 12/16/2023 with complaints of slurred speech and RUQ E weakness and numbness starting at 11:00 a.m. that morning.  CT head was negative.  Lab work revealed BRIAN.  He was given TNK at 12:48 p.m. and admitted to ICU. 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 There is a long segment area of multifocal narrowing in the basilar artery which could represent vasospasm or areas of multifocal narrowing.  60-70% stenosis in the proximal left internal carotid artery secondary to plaque.  No large vessel occlusion is seen.  Prelim echo showed EF 55-60%, negative bubble study.  24 hr repeat CT head showed no acute intracranial hemorrhage.  MRI brain without contrast showed patchy acute ischemic changes in the left posterior frontal and temporal lobes.  Moderate chronic microvascular ischemic changes. Patient kept in ICU additional 24 hours for frequent neuro checks. Patient originally going to undergo DSA, but family decided against it, due to patient's age. VS have remained stable, BRIAN resolved.  The patient was deemed suitable for transfer to the floor. Care was transferred to hospital medicine for further management.     Interval Hx:   Patient is laying in bed, he is extremely hard of hearing.  No complaints today.  Discuss rehab to regain strength and then go home.  Patient is agreeable  No family is at bedside  Case was discussed with patient's nurse and  in ICU as he is boarding here    Objective/physical exam:  General: In no acute distress, afebrile, extremely hard of hearing.  Looks good for his stated age  Chest: Clear to auscultation bilaterally anteriorly  Heart: RRR, +S1, S2, no appreciable  murmur  Abdomen: Soft, nontender, BS +  MSK: Warm, no lower extremity edema, no clubbing or cyanosis  Neurologic: Alert and oriented, Cranial nerve grossly intact, Strength 4/5 in all 4 extremities    VITAL SIGNS: 24 HRS MIN & MAX LAST   Temp  Min: 98.4 °F (36.9 °C)  Max: 98.6 °F (37 °C) 98.4 °F (36.9 °C)   BP  Min: 121/61  Max: 191/92 (!) 165/85   Pulse  Min: 57  Max: 86  60   Resp  Min: 15  Max: 30 18   SpO2  Min: 94 %  Max: 99 % 97 %     I reviewed the labs below:  Recent Labs   Lab 12/17/23  0118 12/18/23 0255 12/19/23  0253   WBC 5.14 7.19 7.20   RBC 5.15 5.26 5.02   HGB 14.5 15.0 14.3   HCT 46.0 46.7 44.2   MCV 89.3 88.8 88.0   MCH 28.2 28.5 28.5   MCHC 31.5* 32.1* 32.4*   RDW 13.9 13.7 13.9    194 182   MPV 11.3* 11.0* 11.3*     Recent Labs   Lab 12/17/23  0118 12/18/23 0255 12/19/23  0253    140 138   K 4.1 3.9 3.7   CO2 21* 20* 19*   BUN 15.6 12.7 11.3   CREATININE 1.11 1.10 1.10   CALCIUM 9.5 9.6 9.1   MG 1.90 2.00 1.80   ALBUMIN 3.3* 3.2* 3.0*   ALKPHOS 86 97 84   ALT 11 12 11   AST 12 18 16   BILITOT 0.9 0.6 0.8     Microbiology Results (last 7 days)       ** No results found for the last 168 hours. 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  Left vertebral artery occlusion  Acute kidney injury - resolved  History of BPH    Admitted to ICU on 12/16, downgraded to hospital medicine team on 12/19  Continue Telemetry monitoring  Continue neuro checks q 4 hours as recommended per Neurology  Neurology on board, appreciate their recommendations  PT/OT/ST on board--> recommended rehab  Started Atorvastatin 20 mg  daily per neuro recs  Continue amlodipine 5 mg daily, aspirin 81 mg daily, finasteride 5 mg daily, tamsulosin 0.4 mg daily and aspirin 81 mg daily  Echo shows normal systolic function with EF of 55-60%, normal right ventricular systolic function.  Left atrium moderately dilated agitated saline study is negative for intracardiac shunt.  Moderate calcified aortic cause, restricted motion.  Mild-to-moderate stenosis with mild-to-moderate aortic regurg, mild mitral regurg, mild pulmonic regurg, no pericardial effusion  Bilateral carotid ultrasound shows less than 50% stenosis  IV Antihypertensives as needed  Resumed home meds on admission  Case management on board for rehab placement  Morning lab stable, repeat Wednesday    VTE prophylaxis:  SCDs    Patient condition:  Stable/Fair/Guarded/ Serious/ Critical    Anticipated discharge and Disposition:   Rehab      All diagnosis and differential diagnosis have been reviewed; assessment and plan has been documented; I have personally reviewed the labs and test results that are presently available; I have reviewed the patients medication list; I have reviewed the consulting providers response and recommendations. I have reviewed or attempted to review medical records based upon their availability    All of the patient's questions have been  addressed and answered. Patient's is agreeable to the above stated plan. I will continue to monitor closely and make adjustments to medical management as needed.  _____________________________________________________________________    Nutrition Status:    Radiology:   I have personally reviewed the images and agree with radiologist report  Echo Saline Bubble? Yes    Left Ventricle: Normal wall motion. There is normal systolic function   with a visually estimated ejection fraction of 55 - 60%.    Right Ventricle: Normal right ventricular cavity size. Systolic   function is normal.    Left Atrium: Left atrium is moderately dilated. Agitated  saline study   of the atrial septum is negative, suggesting absence of intracardiac shunt   at the atrial level.    Aortic Valve: Moderately calcified cusps. Mildly restricted motion.   There is mild to moderate stenosis. Aortic valve area by VTI is 1.62 cm².   Aortic valve peak velocity is 2.33 m/s. Mean gradient is 12 mmHg. The   dimensionless index is 0.51. There is mild to moderate aortic   regurgitation.    Mitral Valve: There is mild regurgitation.    Pulmonic Valve: There is mild regurgitation.    Pericardium: There is no pericardial effusion.  MRI Brain Without Contrast  Narrative: EXAMINATION:  MRI BRAIN WITHOUT CONTRAST    CLINICAL HISTORY:  Stroke, follow up;    TECHNIQUE:  Multiplanar, multisequence MR images of the brain were obtained without the administration of intravenous contrast.    COMPARISON:  CT head dated 12/17/2023    FINDINGS:  There is patchy restricted diffusion in the left posterior frontal lobe and posterior temporal lobe.  Moderate patchy and confluent T2/FLAIR hyperintensities in the subcortical and periventricular white matter left orbital chronic microvascular ischemic changes with prior infarcts in the bilateral basal ganglia and cerebellum.    There is no mass effect midline shift.  The basal cisterns are patent.  There is mild diffuse parenchymal volume loss.  There is no hydrocephalus or abnormal extra-axial fluid collection.  The left vertebral artery flow void is abnormal.  There is trace scattered paranasal sinus mucosal thickening.  Impression: 1. Patchy acute ischemic changes in the left posterior frontal and temporal lobes.  2. Moderate chronic microvascular ischemic changes.    Electronically signed by: Margaret Shay  Date:    12/18/2023  Time:    10:28      Stanislaw Lopez MD  Department of Hospital Medicine   [facility]   12/19/2023

## 2023-12-19 NOTE — NURSING
Nurses Note -- 4 Eyes      12/18/2023   6:14 PM      Skin assessed during: Daily Assessment      [x] No Altered Skin Integrity Present    [x]Prevention Measures Documented      [] Yes- Altered Skin Integrity Present or Discovered   [] LDA Added if Not in Epic (Describe Wound)   [] New Altered Skin Integrity was Present on Admit and Documented in LDA   [] Wound Image Taken    Wound Care Consulted? No    Attending Nurse:  Seema Bernardo RN/Staff Member:  Anastasiia Dhaliwal

## 2023-12-20 PROCEDURE — 25000003 PHARM REV CODE 250: Performed by: NURSE PRACTITIONER

## 2023-12-20 PROCEDURE — 97116 GAIT TRAINING THERAPY: CPT

## 2023-12-20 PROCEDURE — 97530 THERAPEUTIC ACTIVITIES: CPT

## 2023-12-20 PROCEDURE — 63600175 PHARM REV CODE 636 W HCPCS

## 2023-12-20 PROCEDURE — 11000001 HC ACUTE MED/SURG PRIVATE ROOM

## 2023-12-20 PROCEDURE — 97535 SELF CARE MNGMENT TRAINING: CPT | Mod: CO

## 2023-12-20 PROCEDURE — 25000003 PHARM REV CODE 250

## 2023-12-20 RX ORDER — AMLODIPINE BESYLATE 5 MG/1
10 TABLET ORAL DAILY
Status: DISCONTINUED | OUTPATIENT
Start: 2023-12-21 | End: 2023-12-21 | Stop reason: HOSPADM

## 2023-12-20 RX ADMIN — HYDRALAZINE HYDROCHLORIDE 10 MG: 20 INJECTION INTRAMUSCULAR; INTRAVENOUS at 05:12

## 2023-12-20 RX ADMIN — FINASTERIDE 5 MG: 5 TABLET, FILM COATED ORAL at 08:12

## 2023-12-20 RX ADMIN — ASPIRIN 81 MG: 81 TABLET, COATED ORAL at 08:12

## 2023-12-20 RX ADMIN — MUPIROCIN: 20 OINTMENT TOPICAL at 08:12

## 2023-12-20 RX ADMIN — TAMSULOSIN HYDROCHLORIDE 0.4 MG: 0.4 CAPSULE ORAL at 08:12

## 2023-12-20 RX ADMIN — AMLODIPINE BESYLATE 5 MG: 5 TABLET ORAL at 08:12

## 2023-12-20 RX ADMIN — ATORVASTATIN CALCIUM 20 MG: 10 TABLET, FILM COATED ORAL at 08:12

## 2023-12-20 NOTE — PLAN OF CARE
I called and spoke to pt's son Sander [phone] to inquire of choice of acute rehab. He stated he was waiting to hear back from [facility] to be sure they will cover any out of pocket cost. I told him the rehab of choice will submit to insurance and they can verify his insurance and give him that info ahead of time.  He stated his 1st choice is [facility]; 2nd is [facility] and 3rd is [facility].  Referral sent to [facility] via [facility].

## 2023-12-20 NOTE — PT/OT/SLP PROGRESS
Physical Therapy Treatment    Patient Name:  Chichi Mckee   MRN:  [id number]    Recommendations:     Discharge therapy intensity: High Intensity Therapy   Discharge Equipment Recommendations: to be determined by next level of care  Barriers to discharge: Impaired mobility and Ongoing medical needs    Assessment:     Chichi Mckee is a 89 y.o. male admitted with a medical diagnosis of acute ischemic changes in L posterior frontal and temporal lobes, s/p TNK on 12/16.  He presents with the following impairments/functional limitations: weakness, impaired endurance, impaired functional mobility, gait instability, impaired balance, decreased safety awareness. Pt is pleasant and singing during PT session today. Ambulated ~230 feet CGA with RW and no seated rest break. Able to stop and readjust RUE  on RW; RLE stronger today with improved foot clearance and step-through pattern throughout ambulation.     Rehab Prognosis: Good; patient would benefit from acute skilled PT services to address these deficits and reach maximum level of function.    Recent Surgery: * No surgery found *      Plan:     During this hospitalization, patient to be seen 6 x/week to address the identified rehab impairments via gait training, therapeutic activities, therapeutic exercises, neuromuscular re-education and progress toward the following goals:    Plan of Care Expires:  01/31/24    Subjective     Chief Complaint: none stated  Patient/Family Comments/goals: pt asking when he can return home because he is ready   Pain/Comfort:  Pain Rating 1: 0/10      Objective:     Communicated with nurse prior to session.  Patient found HOB elevated with blood pressure cuff, peripheral IV, pulse ox (continuous), telemetry upon PT entry to room.     General Precautions: Standard, fall  Orthopedic Precautions: N/A  Braces: N/A  Respiratory Status: Room air  Blood Pressure: 141/68; HR: 64  Skin Integrity: Visible skin intact      Functional Mobility:  Bed  Mobility:     Supine to Sit: minimum assistance  Transfers:     Sit to Stand:  moderate assistance with rolling walker x2 trials  Gait: Pt ambulated ~230 feet CGA with RW. Better step through gait pattern and RLE foot clearance throughout ambulation. Pt stopping to readjust RUE  when necessary and singing during ambulation. Pt did not need seated rest break but had close chair follow.   Balance: Standing EOB, changed wet brief and pt performed periclean with 1 hand on RW,  SBA/CGA for standing balance.     Therapeutic Activities/Exercises:    Education:  Patient provided with verbal education education regarding PT role/goals/POC, fall prevention, and safety awareness.  Understanding was verbalized, however additional teaching warranted.     Patient left up in chair with all lines intact, call button in reach, chair alarm on, and nurse notified..    GOALS:   Multidisciplinary Problems       Physical Therapy Goals          Problem: Physical Therapy    Goal Priority Disciplines Outcome Goal Variances Interventions   Physical Therapy Goal     PT, PT/OT Ongoing, Progressing     Description: Goals to be met by: 2024     Patient will increase functional independence with mobility by performin. Supine to sit with Stand-by Assistance  2. Sit to stand transfer with Stand-by Assistance  3. Bed to chair transfer with Modified [place] using Rolling Walker  4. Gait  x 150 feet with Modified [place] using Rolling Walker.                          Time Tracking:     PT Received On: 23  PT Start Time: 919     PT Stop Time: 950  PT Total Time (min): 31 min     Billable Minutes: Gait Training 16 and Therapeutic Activity 15    Treatment Type: Treatment  PT/PTA: PT     Number of PTA visits since last PT visit: 2     2023

## 2023-12-20 NOTE — PROGRESS NOTES
[facility] Hospital Medicine Progress Note        Chief Complaint: Inpatient Follow-up for acute stroke     HPI: Mr. Mckee is a 89 year old male with a pmh of HTN, HLD, PAD, aortic stenosis, bladder mass, and BPH who presented to the ED as a Code Fast on 12/16/2023 with complaints of slurred speech and RUQ E weakness and numbness starting at 11:00 a.m. that morning.  CT head was negative.  Lab work revealed BRIAN.  He was given TNK at 12:48 p.m. and admitted to ICU. 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 There is a long segment area of multifocal narrowing in the basilar artery which could represent vasospasm or areas of multifocal narrowing.  60-70% stenosis in the proximal left internal carotid artery secondary to plaque.  No large vessel occlusion is seen.  Prelim echo showed EF 55-60%, negative bubble study.  24 hr repeat CT head showed no acute intracranial hemorrhage.  MRI brain without contrast showed patchy acute ischemic changes in the left posterior frontal and temporal lobes.  Moderate chronic microvascular ischemic changes. Patient kept in ICU additional 24 hours for frequent neuro checks. Patient originally going to undergo DSA, but family decided against it, due to patient's age. VS have remained stable, BRIAN resolved.  The patient was deemed suitable for transfer to the floor. Care was transferred to hospital medicine for further management.     Interval Hx:   Patient seen and examined no complaints no family member with bedside a  Objective/physical exam:  General: In no acute distress, afebrile, extremely hard of hearing.  Looks good for his stated age  Chest: Clear to auscultation bilaterally anteriorly  Heart: RRR, +S1, S2, no appreciable murmur  Abdomen: Soft, nontender, BS +  MSK: Warm, no lower extremity edema, no clubbing or cyanosis  Neurologic: Alert and oriented, Cranial nerve grossly intact, Strength 4/5 in all 4  extremities    VITAL SIGNS: 24 HRS MIN & MAX LAST   Temp  Min: 98.1 °F (36.7 °C)  Max: 98.6 °F (37 °C) 98.3 °F (36.8 °C)   BP  Min: 139/69  Max: 187/93 (!) 187/93   Pulse  Min: 54  Max: 81  69   Resp  Min: 16  Max: 26 (!) 26   SpO2  Min: 95 %  Max: 98 % 95 %     I reviewed the labs below:  Recent Labs   Lab 12/17/23 0118 12/18/23 0255 12/19/23 0253   WBC 5.14 7.19 7.20   RBC 5.15 5.26 5.02   HGB 14.5 15.0 14.3   HCT 46.0 46.7 44.2   MCV 89.3 88.8 88.0   MCH 28.2 28.5 28.5   MCHC 31.5* 32.1* 32.4*   RDW 13.9 13.7 13.9    194 182   MPV 11.3* 11.0* 11.3*       Recent Labs   Lab 12/17/23 0118 12/18/23 0255 12/19/23 0253    140 138   K 4.1 3.9 3.7   CO2 21* 20* 19*   BUN 15.6 12.7 11.3   CREATININE 1.11 1.10 1.10   CALCIUM 9.5 9.6 9.1   MG 1.90 2.00 1.80   ALBUMIN 3.3* 3.2* 3.0*   ALKPHOS 86 97 84   ALT 11 12 11   AST 12 18 16   BILITOT 0.9 0.6 0.8       Microbiology Results (last 7 days)       ** No results found for the last 168 hours. 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  Left vertebral artery occlusion  Acute kidney injury - resolved  History of BPH    Will increase the dose of amlodipine to 10 mg since blood pressure has been high continue with aspirin, finasteride, tamsulosin and atorvastatin   PT OT ST consulted and they are recommending rehab   Continue Telemetry monitoring  Continue neuro checks q 4 hours as recommended per Neurology  Neurology on board, appreciate their recommendations  PT/OT/ST on board--> recommended rehab  Started Atorvastatin 20 mg daily per neuro recs  Continue amlodipine 5 mg daily,  aspirin 81 mg daily, finasteride 5 mg daily, tamsulosin 0.4 mg daily and aspirin 81 mg daily  Echo shows normal systolic function with EF of 55-60%, normal right ventricular systolic function.  Left atrium moderately dilated agitated saline study is negative for intracardiac shunt.  Moderate calcified aortic cause, restricted motion.  Mild-to-moderate stenosis with mild-to-moderate aortic regurg, mild mitral regurg, mild pulmonic regurg, no pericardial effusion  Bilateral carotid ultrasound shows less than 50% stenosis  Case management consulted for rehab placement  VTE prophylaxis:  SCDs    Patient condition:  Stable/Fair/Guarded/ Serious/ Critical    Anticipated discharge and Disposition:   Rehab      All diagnosis and differential diagnosis have been reviewed; assessment and plan has been documented; I have personally reviewed the labs and test results that are presently available; I have reviewed the patients medication list; I have reviewed the consulting providers response and recommendations. I have reviewed or attempted to review medical records based upon their availability    All of the patient's questions have been  addressed and answered. Patient's is agreeable to the above stated plan. I will continue to monitor closely and make adjustments to medical management as needed.  _____________________________________________________________________    Nutrition Status:    Radiology:   I have personally reviewed the images and agree with radiologist report  Echo Saline Bubble? Yes    Left Ventricle: Normal wall motion. There is normal systolic function   with a visually estimated ejection fraction of 55 - 60%.    Right Ventricle: Normal right ventricular cavity size. Systolic   function is normal.    Left Atrium: Left atrium is moderately dilated. Agitated saline study   of the atrial septum is negative, suggesting absence of intracardiac shunt   at the atrial level.    Aortic Valve: Moderately calcified  cusps. Mildly restricted motion.   There is mild to moderate stenosis. Aortic valve area by VTI is 1.62 cm².   Aortic valve peak velocity is 2.33 m/s. Mean gradient is 12 mmHg. The   dimensionless index is 0.51. There is mild to moderate aortic   regurgitation.    Mitral Valve: There is mild regurgitation.    Pulmonic Valve: There is mild regurgitation.    Pericardium: There is no pericardial effusion.  MRI Brain Without Contrast  Narrative: EXAMINATION:  MRI BRAIN WITHOUT CONTRAST    CLINICAL HISTORY:  Stroke, follow up;    TECHNIQUE:  Multiplanar, multisequence MR images of the brain were obtained without the administration of intravenous contrast.    COMPARISON:  CT head dated 12/17/2023    FINDINGS:  There is patchy restricted diffusion in the left posterior frontal lobe and posterior temporal lobe.  Moderate patchy and confluent T2/FLAIR hyperintensities in the subcortical and periventricular white matter left orbital chronic microvascular ischemic changes with prior infarcts in the bilateral basal ganglia and cerebellum.    There is no mass effect midline shift.  The basal cisterns are patent.  There is mild diffuse parenchymal volume loss.  There is no hydrocephalus or abnormal extra-axial fluid collection.  The left vertebral artery flow void is abnormal.  There is trace scattered paranasal sinus mucosal thickening.  Impression: 1. Patchy acute ischemic changes in the left posterior frontal and temporal lobes.  2. Moderate chronic microvascular ischemic changes.    Electronically signed by: Margaret Shay  Date:    12/18/2023  Time:    10:28      Lakeisha Altman MD  Department of Hospital Medicine   [facility]   12/20/2023

## 2023-12-20 NOTE — PT/OT/SLP PROGRESS
Occupational Therapy   Treatment    Name: Chichi Mckee  MRN: [id number]  Admitting Diagnosis:   acute ischemic changes ,temporal lobes       Recommendations:     Recommended therapy intensity at discharge: High Intensity Therapy   Discharge Equipment Recommendations:  to be determined by next level of care  Barriers to discharge:       Assessment:     Chichi Mckee is a 89 y.o. male with a medical diagnosis of acute ischemic changes ,temporal lobes.  He presents with increased [place] with costant redirection required for safety. Mod A for mobility. Continue POC Performance deficits affecting function are weakness, impaired endurance, impaired functional mobility, impaired self care skills, impaired balance.     Rehab Prognosis:  Good; patient would benefit from acute skilled OT services to address these deficits and reach maximum level of function.       Plan:     Patient to be seen 5 x/week to address the above listed problems via self-care/home management, therapeutic activities, therapeutic exercises  Plan of Care Expires: 01/18/24  Plan of Care Reviewed with: patient    Subjective   Orientated x 1     Objective:     Communicated with: RN prior to session.  Patient found up in chair with   upon OT entry to room.    General Precautions: Standard, fall, other (see comments) (-160)    Orthopedic Precautions:N/A  Braces: N/A  Respiratory Status: Room air  Vital Signs: Blood Pressure: 147/86  HR: 65  Sp02: 95     Occupational Performance:   (Sit to stand-Mod A) from BS chair.   Pt. Ambulating from BS to sink with Mod A using RW for UE support with balance, increased cueing required for safety and navigation with walker.   (Standing balance- Mod A) while performing grooming task (brushing teeth) increased cueing required for preparation and setup using R UE.  Pt. Left in BS chair with all needs within reach.       Therapeutic Positioning    OT interventions performed during the course of today's session in an  effort to prevent and/or reduce acquired pressure injuries:   Therapeutic positioning was provided at the conclusion of session to offload all bony prominences for the prevention and/or reduction of pressure injuries        [facility] 6 Click ADL:      Patient Education:  Patient provided with verbal education education regarding fall prevention and safety awareness.  Additional teaching is warranted.      Patient left up in chair with all lines intact and call button in reach    GOALS:   Multidisciplinary Problems       Occupational Therapy Goals          Problem: Occupational Therapy    Goal Priority Disciplines Outcome Interventions   Occupational Therapy Goal     OT, PT/OT Ongoing, Progressing    Description: Goals to be met by: 1/18/24     Patient will increase functional independence with ADLs by performing:    UE Dressing with Supervision.  LE Dressing with Supervision.  Grooming while standing at sink with Supervision.  Toileting from toilet with Supervision for hygiene and clothing management.   Toilet transfer to toilet with Supervision.                         Time Tracking:     OT Date of Treatment: 12/20/23  OT Start Time: 1017  OT Stop Time: 1041  OT Total Time (min): 24 min    Billable Minutes:Self Care/Home Management 2    OT/EM: EM     Number of EM visits since last OT visit: 1    12/20/2023

## 2023-12-21 ENCOUNTER — HOSPITAL ENCOUNTER (INPATIENT)
Facility: HOSPITAL | Age: 88
LOS: 15 days | Discharge: HOME HEALTH | DRG: 057 | End: 2024-01-05
Attending: INTERNAL MEDICINE | Admitting: INTERNAL MEDICINE
Payer: MEDICARE

## 2023-12-21 VITALS
HEART RATE: 72 BPM | BODY MASS INDEX: 28.35 KG/M2 | OXYGEN SATURATION: 95 % | HEIGHT: 70 IN | RESPIRATION RATE: 19 BRPM | DIASTOLIC BLOOD PRESSURE: 79 MMHG | SYSTOLIC BLOOD PRESSURE: 165 MMHG | TEMPERATURE: 98 F | WEIGHT: 198 LBS

## 2023-12-21 DIAGNOSIS — N17.9 AKI (ACUTE KIDNEY INJURY): ICD-10-CM

## 2023-12-21 DIAGNOSIS — I10 ESSENTIAL HYPERTENSION: ICD-10-CM

## 2023-12-21 DIAGNOSIS — I63.9 STROKE: ICD-10-CM

## 2023-12-21 DIAGNOSIS — R53.1 RIGHT SIDED WEAKNESS: ICD-10-CM

## 2023-12-21 DIAGNOSIS — E78.5 HYPERLIPIDEMIA, UNSPECIFIED HYPERLIPIDEMIA TYPE: ICD-10-CM

## 2023-12-21 DIAGNOSIS — I63.9 CEREBROVASCULAR ACCIDENT (CVA), UNSPECIFIED MECHANISM: Primary | ICD-10-CM

## 2023-12-21 PROCEDURE — 25000003 PHARM REV CODE 250

## 2023-12-21 PROCEDURE — 11800000 HC REHAB PRIVATE ROOM

## 2023-12-21 PROCEDURE — 25000003 PHARM REV CODE 250: Performed by: INTERNAL MEDICINE

## 2023-12-21 PROCEDURE — 97535 SELF CARE MNGMENT TRAINING: CPT

## 2023-12-21 PROCEDURE — 25000003 PHARM REV CODE 250: Performed by: NURSE PRACTITIONER

## 2023-12-21 PROCEDURE — 92523 SPEECH SOUND LANG COMPREHEN: CPT

## 2023-12-21 PROCEDURE — 92611 MOTION FLUOROSCOPY/SWALLOW: CPT

## 2023-12-21 PROCEDURE — 97530 THERAPEUTIC ACTIVITIES: CPT | Mod: CQ

## 2023-12-21 PROCEDURE — 97116 GAIT TRAINING THERAPY: CPT | Mod: CQ

## 2023-12-21 RX ORDER — TRAMADOL HYDROCHLORIDE 50 MG/1
50 TABLET ORAL EVERY 8 HOURS PRN
Status: DISCONTINUED | OUTPATIENT
Start: 2023-12-21 | End: 2024-01-05 | Stop reason: HOSPADM

## 2023-12-21 RX ORDER — ASPIRIN 81 MG/1
81 TABLET ORAL DAILY
Status: DISCONTINUED | OUTPATIENT
Start: 2023-12-23 | End: 2024-01-05 | Stop reason: HOSPADM

## 2023-12-21 RX ORDER — ONDANSETRON 4 MG/1
8 TABLET, ORALLY DISINTEGRATING ORAL EVERY 6 HOURS PRN
Status: DISCONTINUED | OUTPATIENT
Start: 2023-12-21 | End: 2024-01-05 | Stop reason: HOSPADM

## 2023-12-21 RX ORDER — METOPROLOL TARTRATE 1 MG/ML
10 INJECTION, SOLUTION INTRAVENOUS
Status: DISCONTINUED | OUTPATIENT
Start: 2023-12-21 | End: 2024-01-05 | Stop reason: HOSPADM

## 2023-12-21 RX ORDER — ALPRAZOLAM 0.25 MG/1
0.25 TABLET ORAL 3 TIMES DAILY PRN
Status: DISCONTINUED | OUTPATIENT
Start: 2023-12-21 | End: 2024-01-05 | Stop reason: HOSPADM

## 2023-12-21 RX ORDER — LABETALOL HCL 20 MG/4 ML
10 SYRINGE (ML) INTRAVENOUS EVERY 4 HOURS PRN
Status: DISCONTINUED | OUTPATIENT
Start: 2023-12-21 | End: 2024-01-05 | Stop reason: HOSPADM

## 2023-12-21 RX ORDER — HYDROXYZINE PAMOATE 50 MG/1
50 CAPSULE ORAL NIGHTLY PRN
Status: DISCONTINUED | OUTPATIENT
Start: 2023-12-21 | End: 2024-01-05 | Stop reason: HOSPADM

## 2023-12-21 RX ORDER — ATORVASTATIN CALCIUM 10 MG/1
20 TABLET, FILM COATED ORAL DAILY
Status: DISCONTINUED | OUTPATIENT
Start: 2023-12-23 | End: 2024-01-05 | Stop reason: HOSPADM

## 2023-12-21 RX ORDER — ATORVASTATIN CALCIUM 20 MG/1
20 TABLET, FILM COATED ORAL DAILY
Qty: 90 TABLET | Refills: 3 | Status: ON HOLD
Start: 2023-12-22 | End: 2024-01-02

## 2023-12-21 RX ORDER — BENZONATATE 100 MG/1
100 CAPSULE ORAL 3 TIMES DAILY PRN
Status: DISCONTINUED | OUTPATIENT
Start: 2023-12-21 | End: 2024-01-05 | Stop reason: HOSPADM

## 2023-12-21 RX ORDER — DOCUSATE SODIUM 100 MG/1
100 CAPSULE, LIQUID FILLED ORAL 2 TIMES DAILY
Status: DISCONTINUED | OUTPATIENT
Start: 2023-12-21 | End: 2024-01-05 | Stop reason: HOSPADM

## 2023-12-21 RX ORDER — ONDANSETRON 4 MG/1
4 TABLET, ORALLY DISINTEGRATING ORAL EVERY 6 HOURS PRN
Status: DISCONTINUED | OUTPATIENT
Start: 2023-12-21 | End: 2024-01-05 | Stop reason: HOSPADM

## 2023-12-21 RX ORDER — PROMETHAZINE HYDROCHLORIDE 25 MG/1
25 TABLET ORAL EVERY 6 HOURS PRN
Status: DISCONTINUED | OUTPATIENT
Start: 2023-12-21 | End: 2024-01-05 | Stop reason: HOSPADM

## 2023-12-21 RX ORDER — POLYETHYLENE GLYCOL 3350 17 G/17G
17 POWDER, FOR SOLUTION ORAL 2 TIMES DAILY PRN
Status: DISCONTINUED | OUTPATIENT
Start: 2023-12-21 | End: 2023-12-27

## 2023-12-21 RX ORDER — FACIAL-BODY WIPES
10 EACH TOPICAL DAILY PRN
Status: DISCONTINUED | OUTPATIENT
Start: 2023-12-21 | End: 2024-01-05 | Stop reason: HOSPADM

## 2023-12-21 RX ORDER — TAMSULOSIN HYDROCHLORIDE 0.4 MG/1
0.4 CAPSULE ORAL NIGHTLY
Status: DISCONTINUED | OUTPATIENT
Start: 2023-12-21 | End: 2024-01-05 | Stop reason: HOSPADM

## 2023-12-21 RX ORDER — ASPIRIN 81 MG/1
81 TABLET ORAL DAILY
Refills: 0 | Status: ON HOLD
Start: 2023-12-22 | End: 2024-01-02

## 2023-12-21 RX ORDER — NITROGLYCERIN 0.4 MG/1
0.4 TABLET SUBLINGUAL EVERY 5 MIN PRN
Status: DISCONTINUED | OUTPATIENT
Start: 2023-12-21 | End: 2024-01-05 | Stop reason: HOSPADM

## 2023-12-21 RX ORDER — AMLODIPINE BESYLATE 5 MG/1
5 TABLET ORAL DAILY
Status: DISCONTINUED | OUTPATIENT
Start: 2023-12-22 | End: 2023-12-23

## 2023-12-21 RX ORDER — HYDRALAZINE HYDROCHLORIDE 20 MG/ML
10 INJECTION INTRAMUSCULAR; INTRAVENOUS EVERY 4 HOURS PRN
Status: DISCONTINUED | OUTPATIENT
Start: 2023-12-21 | End: 2024-01-05 | Stop reason: HOSPADM

## 2023-12-21 RX ORDER — ACETAMINOPHEN 325 MG/1
650 TABLET ORAL EVERY 4 HOURS PRN
Status: DISCONTINUED | OUTPATIENT
Start: 2023-12-21 | End: 2024-01-05 | Stop reason: HOSPADM

## 2023-12-21 RX ORDER — FINASTERIDE 5 MG/1
5 TABLET, FILM COATED ORAL DAILY
Status: DISCONTINUED | OUTPATIENT
Start: 2023-12-22 | End: 2024-01-05 | Stop reason: HOSPADM

## 2023-12-21 RX ADMIN — AMLODIPINE BESYLATE 10 MG: 5 TABLET ORAL at 08:12

## 2023-12-21 RX ADMIN — ASPIRIN 81 MG: 81 TABLET, COATED ORAL at 08:12

## 2023-12-21 RX ADMIN — TAMSULOSIN HYDROCHLORIDE 0.4 MG: 0.4 CAPSULE ORAL at 08:12

## 2023-12-21 RX ADMIN — ATORVASTATIN CALCIUM 20 MG: 10 TABLET, FILM COATED ORAL at 08:12

## 2023-12-21 RX ADMIN — DOCUSATE SODIUM 100 MG: 100 CAPSULE, LIQUID FILLED ORAL at 08:12

## 2023-12-21 RX ADMIN — FINASTERIDE 5 MG: 5 TABLET, FILM COATED ORAL at 08:12

## 2023-12-21 RX ADMIN — MUPIROCIN: 20 OINTMENT TOPICAL at 08:12

## 2023-12-21 NOTE — PROCEDURES
[facility]  Speech Language Pathology Department  Modified Barium Swallow (MBS) Study    Patient Name:  Chichi Mckee   MRN:  [id number]    Recommendations:     General recommendations:  SLP intervention not indicated, diet f/u x1   Repeat MBS study: not warranted at this time  Diet texture/consistency recommendations:  Regular solids (IDDSI 7) and thin liquids (IDDSI 0)  Medications: per patient preference  Swallow strategies/precautions: small bites/sips, slow rate, and assist with feeding as needed  General precautions: Standard,      History/Reason for Referral:     Chichi Mckee is a/n 89 y.o. male admitted for stroke like symptoms. Pt s/p tenecteplase.  CT head with no acute intracranial abnormalities but findings of chronic muscular vascular ischemic changes.    Pt coughing with breakfast this am. SLP to further evaluate swallow function.      Past Medical History:   Diagnosis Date    Hypertension      A MBS Study was completed to assess the efficiency of his swallow function, rule out aspiration and make recommendations regarding safe dietary consistencies, effective compensatory strategies, and safe eating environment.     Home diet texture/consistency: Regular and thin liquids  Current Method of Nutrition: NPO    Imaging       Results for orders placed during the hospital encounter of 12/16/23    MRI Brain Without Contrast    Narrative  EXAMINATION:  MRI BRAIN WITHOUT CONTRAST    CLINICAL HISTORY:  Stroke, follow up;    TECHNIQUE:  Multiplanar, multisequence MR images of the brain were obtained without the administration of intravenous contrast.    COMPARISON:  CT head dated 12/17/2023    FINDINGS:  There is patchy restricted diffusion in the left posterior frontal lobe and posterior temporal lobe.  Moderate patchy and confluent T2/FLAIR hyperintensities in the subcortical and periventricular white matter left orbital chronic microvascular ischemic changes with prior infarcts  in the bilateral basal ganglia and cerebellum.    There is no mass effect midline shift.  The basal cisterns are patent.  There is mild diffuse parenchymal volume loss.  There is no hydrocephalus or abnormal extra-axial fluid collection.  The left vertebral artery flow void is abnormal.  There is trace scattered paranasal sinus mucosal thickening.    Impression  1. Patchy acute ischemic changes in the left posterior frontal and temporal lobes.  2. Moderate chronic microvascular ischemic changes.      Electronically signed by: Margaret Shay  Date:    12/18/2023  Time:    10:28    Subjective:     Patient awake and alert.  Spiritual/Cultural/Restoration Beliefs/Practices that affect care: no  Pain/Comfort: Pain Rating 1: 0/10    Fluoroscopic Results:     Oral Musculature  Dentition: own teeth and missing teeth  Secretion Management: adequate  Mucosal Quality: good  Facial Movement: WFL  Buccal Strength & Mobility: WFL  Mandibular Strength & Mobility: WFL  Oral Labial Strength & Mobility: WFL  Lingual Strength & Mobility: WFL  Vocal Quality: adequate    Positioning: upright in bed  Visualization  Patient was seen in the lateral view    Oral Phase:   Adequate lip closure  Adequate bolus formation  Adequate anterior-posterior transport  Adequate mastication  Adequate bolus cohesion    Pharyngeal Phase:   Timely swallow reflex  Adequate base of tongue retraction  Adequate epiglottic deflection  Adequate hyolaryngeal excursion  Adequate airway protection  Consistency Laryngeal Penetration Aspiration Residue   Mildly thick liquid by straw None None None   Thin liquid by straw None None None   Puree None None None   Chewable solid None None None     Cervical Esophageal Phase:   UES appeared to accommodate all bolus types without stasis or retrograde movement visualized    Assessment:     Oropharyngeal swallow WFL with no laryngeal penetration or aspiration visualized during this study. Pt will require 1:1 assist feed for  meals. No skilled SLP intervention is warranted at this time.     Patient Education:     Patient provided with verbal education regarding POC.  Understanding was verbalized.    Time Tracking:     SLP Treatment Date:    12/21/23  Speech Start Time:   1100  Speech Stop Time:    1120    Speech Total Time (min):   20    Billable minutes:   Motion Fluoroscopic Evaluation, Video Recording, 20 minutes     12/21/2023

## 2023-12-21 NOTE — PT/OT/SLP EVAL
[facility]  Speech Language Pathology Department  Cognitive-Communication Evaluation    Patient Name:  Chichi Mckee   MRN:  [id number]    Recommendations:     General recommendations:  Modified Barium Swallow Study  Communication strategies:  hearing aids  Discharge therapy intensity: No Therapy Indicated  Barriers to safe discharge:  24 hour care    History:     Chichi Mckee is a/n 89 y.o. male admitted for stroke like symptoms. Pt s/p tenecteplase.  CT head with no acute intracranial abnormalities but findings of chronic muscular vascular ischemic changes.        Past Medical History:   Diagnosis Date    Hypertension      Previous level of Function  Occupation: retired  Handed: Right  Glasses: no  Hearing Aids: yes    Imaging   Results for orders placed during the hospital encounter of 12/16/23    MRI Brain Without Contrast    Narrative  EXAMINATION:  MRI BRAIN WITHOUT CONTRAST    CLINICAL HISTORY:  Stroke, follow up;    TECHNIQUE:  Multiplanar, multisequence MR images of the brain were obtained without the administration of intravenous contrast.    COMPARISON:  CT head dated 12/17/2023    FINDINGS:  There is patchy restricted diffusion in the left posterior frontal lobe and posterior temporal lobe.  Moderate patchy and confluent T2/FLAIR hyperintensities in the subcortical and periventricular white matter left orbital chronic microvascular ischemic changes with prior infarcts in the bilateral basal ganglia and cerebellum.    There is no mass effect midline shift.  The basal cisterns are patent.  There is mild diffuse parenchymal volume loss.  There is no hydrocephalus or abnormal extra-axial fluid collection.  The left vertebral artery flow void is abnormal.  There is trace scattered paranasal sinus mucosal thickening.    Impression  1. Patchy acute ischemic changes in the left posterior frontal and temporal lobes.  2. Moderate chronic microvascular ischemic  changes.      Electronically signed by: Magraret Shay  Date:    12/18/2023  Time:    10:28    Subjective     Patient awake and alert.  Spiritual/Cultural/Orthodoxy Beliefs/Practices that affect care: no  Pain/Comfort: Pain Rating 1: 0/10    Objective:     ORAL MUSCULATURE  Dentition: own teeth and missing teeth  Facial Movement: WFL  Buccal Strength & Mobility: WFL    SPEECH PRODUCTION  Phoneme Production: adequate  Voice Quality: adequate  Voice Production: adequate  Speech Rate: appropriate  Loudness: acceptable  Speech Intelligibility  Known Context: Greater that 90%  Unknown Context: Greater that 90%    AUDITORY COMPREHENSION  Identification:  Body parts: 100%  Objects: 100%  Following Directions:  1-Step: 100%  Yes/No Questions:  Biographical: Within Functional Limits  Environmental: Within Functional Limits  Simple: Within Functional Limits    VERBAL EXPRESSION  Automatic Speech:  Functional needs: Within Functional Limits  Days of the week: Within Functional Limits    Confrontation Naming  Body Parts: Within Functional Limits  Objects: Within Functional Limits  Wh- Questions:  Object name: Within Functional Limits    COGNITION  Orientation:  Person: yes  Place: yes  Time: yes  Situation: yes   Attention:  Focused: Within Functional Limits  Memory:  Immediate: Within Functional Limits  Delayed: Within Functional Limits  Problem Solving  Functional simple: Within Functional Limits  Organization:  Convergent thinking: Within Functional Limits  Divergent thinking: Within Functional Limits    Assessment:     Pt presents with functional speech and language skills, cognitive lingustic skills note to be at baseline. No skilled SLP intervention is warranted at this time. REC: 24 hour supervision.     Pt coughing with breakfast at time of speech, language evaluation. REC: MBS to further evaluate swallow function.     Patient Education:     Patient provided with verbal education regarding POC.  Understanding was  verbalized.     Time Tracking:     SLP Treatment Date:    12/21/23  Speech Start Time:   0745  Speech Stop Time:    0800    Speech Total Time (min):   15    Billable minutes:  Evaluation of Speech Sound Production with Comprehension and Expression, 15 minutes     12/21/2023

## 2023-12-21 NOTE — NURSING
Nurses Note -- 4 Eyes      12/21/2023   6:18 AM      Skin assessed during: Daily Assessment      [x] No Altered Skin Integrity Present    [x]Prevention Measures Documented      [] Yes- Altered Skin Integrity Present or Discovered   [] LDA Added if Not in Epic (Describe Wound)   [] New Altered Skin Integrity was Present on Admit and Documented in LDA   [] Wound Image Taken    Wound Care Consulted? No    Attending Nurse:  Ayleen Stephenson RN    Second RN/Staff Member:  Tim Graham RN

## 2023-12-21 NOTE — DISCHARGE INSTRUCTIONS
Pt educated on discharge instructions. Pt will be transferred to [facility] @ 3pm per van and wheelchair. VSS on RA with NAD noted. Daughter at bedside aware of discharge from hospital and transfer to [facility].

## 2023-12-21 NOTE — PLAN OF CARE
12/21/23 1244   Final Note   Assessment Type Final Discharge Note   Anticipated Discharge Disposition Rehab   Post-Acute Status   Post-Acute Authorization Placement   Post-Acute Placement Status Set-up Complete/Auth obtained   Discharge Delays None known at this time     Patient will dc to  [facility] inpatient rehab today. Report information given to nurse caring for patient. Transportation arranged with [facility] transport van for 3pm. [facility] Boucher has been notified of dc plan.

## 2023-12-21 NOTE — DISCHARGE SUMMARY
[facility] Hospital Medicine Discharge Summary    Admit Date: 12/16/2023  Discharge Date and Time: 12/21/202312:13 PM  Admitting Physician: NOELLE Team  Discharging Physician: Lakeisha Altman MD.  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 that morning.  CT head was negative.  Lab work revealed BRIAN.  He was given TNK at 12:48 p.m. and admitted to ICU. 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 There is a long segment area of multifocal narrowing in the basilar artery which could represent vasospasm or areas of multifocal narrowing.  60-70% stenosis in the proximal left internal carotid artery secondary to plaque.  No large vessel occlusion is seen.  Prelim echo showed EF 55-60%, negative bubble study.  24 hr repeat CT head showed no acute intracranial hemorrhage.  MRI brain without contrast showed patchy acute ischemic changes in the left posterior frontal and temporal lobes.  Moderate chronic microvascular ischemic changes. Patient kept in ICU additional 24 hours for frequent neuro checks. Patient originally going to undergo DSA, but family decided against it, due to patient's age. VS have remained stable, BRIAN resolved.  The patient was deemed suitable for transfer to the floor. Care was transferred to hospital medicine for further management.  On the floor we continued with neuro checks blood pressure was elevated so we had to increase the dose of amlodipine continue with aspirin patient was working with PT and OT so was discharged to  rehab in stable condition     Pt was seen and examined on the day of discharge  Vitals:  VITAL SIGNS: 24 HRS MIN & MAX LAST   Temp  Min: 97.7 °F (36.5 °C)  Max: 98.3 °F (36.8 °C) 97.9 °F (36.6 °C)   BP  Min: 149/81  Max: 179/74 (!) 165/79   Pulse  Min: 56  Max: 72  72   Resp  Min: 14  Max: 28 19   SpO2  Min: 95 %  Max: 98 % 95 %       Physical Exam:  General: In no acute distress, afebrile, extremely hard of hearing.  Looks good for his stated age  Chest: Clear to auscultation bilaterally anteriorly  Heart: RRR, +S1, S2, no appreciable murmur  Abdomen: Soft, nontender, BS +  MSK: Warm, no lower extremity edema, no clubbing or cyanosis  Neurologic: Alert and oriented    Procedures Performed: No admission procedures for hospital encounter.     Significant Diagnostic Studies: See Full reports for all details    Recent Labs   Lab 12/17/23 0118 12/18/23 0255 12/19/23 0253   WBC 5.14 7.19 7.20   RBC 5.15 5.26 5.02   HGB 14.5 15.0 14.3   HCT 46.0 46.7 44.2   MCV 89.3 88.8 88.0   MCH 28.2 28.5 28.5   MCHC 31.5* 32.1* 32.4*   RDW 13.9 13.7 13.9    194 182   MPV 11.3* 11.0* 11.3*       Recent Labs   Lab 12/17/23  0118 12/18/23 0255 12/19/23 0253    140 138   K 4.1 3.9 3.7   CO2 21* 20* 19*   BUN 15.6 12.7 11.3   CREATININE 1.11 1.10 1.10   CALCIUM 9.5 9.6 9.1   MG 1.90 2.00 1.80   ALBUMIN 3.3* 3.2* 3.0*   ALKPHOS 86 97 84   ALT 11 12 11   AST 12 18 16   BILITOT 0.9 0.6 0.8        Microbiology Results (last 7 days)       ** No results found for the last 168 hours. **             Fl Modified Barium Swallow Speech  See procedure notes from Speech Pathologist.    This procedure was auto-finalized.         Medication List        START taking these medications      aspirin 81 MG EC tablet  Commonly known as: ECOTRIN  Take 1 tablet (81 mg total) by mouth once daily.  Start taking on: December 22, 2023     atorvastatin 20 MG tablet  Commonly known as: LIPITOR  Take 1 tablet (20 mg total) by mouth once daily.  Start  taking on: December 22, 2023            CONTINUE taking these medications      amLODIPine 10 MG tablet  Commonly known as: NORVASC     finasteride 5 mg tablet  Commonly known as: PROSCAR     tamsulosin 0.4 mg Cap  Commonly known as: FLOMAX               Where to Get Your Medications        Information about where to get these medications is not yet available    Ask your nurse or doctor about these medications  aspirin 81 MG EC tablet  atorvastatin 20 MG tablet          Explained in detail to the patient about the discharge plan, medications, and follow-up visits. Pt understands and agrees with the treatment plan  Discharge Disposition: Home or Self Care   Discharged Condition: stable  Diet-   Dietary Orders (From admission, onward)       Start     Ordered    12/17/23 1239  Diet Adult Regular  Diet effective now         12/17/23 1238                   Medications Per DC med rec  Activities as tolerated   Follow-up Information       Tyron Rahman MD. Schedule an appointment as soon as possible for a visit.    Specialties: Neurology, Interventional Neurology  Why: Please schedule in the Stroke Clinic in 2-4 weeks, 6 weeks if going to rehab  Contact information:  [address] [place] [address]  [phone]               No, Primary Doctor Follow up in 1 week(s).                           For further questions contact hospitalist office    Discharge time 33 minutes    For worsening symptoms, chest pain, shortness of breath, increased abdominal pain, high grade fever, stroke or stroke like symptoms, immediately go to the nearest Emergency Room or call 911 as soon as possible.      Lakeisha Patel M.D, on 12/21/2023. at 12:13 PM.

## 2023-12-21 NOTE — NURSING
Pt transported to ED area for van tranport to [facility] via wheelchair. NAD noted. Daughter has all personal belongings and will me pt at rehab.

## 2023-12-21 NOTE — PT/OT/SLP PROGRESS
Physical Therapy Treatment    Patient Name:  Chichi Mckee   MRN:  [id number]    Recommendations:     Discharge therapy intensity: High Intensity Therapy   Discharge Equipment Recommendations: to be determined by next level of care  Barriers to discharge:  placement    Assessment:     Chichi Mckee is a 89 y.o. male admitted with a medical diagnosis of acute ischemic changes ,temporal lobes.  He presents with the following impairments/functional limitations: weakness, impaired endurance, impaired sensation, impaired self care skills, impaired functional mobility .    Rehab Prognosis: Good; patient would benefit from acute skilled PT services to address these deficits and reach maximum level of function.    Recent Surgery: * No surgery found *      Plan:     During this hospitalization, patient to be seen 6 x/week to address the identified rehab impairments via gait training, therapeutic activities, therapeutic exercises and progress toward the following goals:    Plan of Care Expires:  01/31/24    Subjective     Chief Complaint: none  Patient/Family Comments/goals:   Pain/Comfort:         Objective:     Communicated with RN prior to session.  Patient found HOB elevated with   upon PT entry to room.     General Precautions: Standard, fall  Orthopedic Precautions: N/A  Braces: N/A  Respiratory Status: Room air  Blood Pressure: 144/84  Skin Integrity: Visible skin intact      Functional Mobility:  Bed Mobility:     Supine to Sit: stand by assistance  Transfers:     Sit to Stand:  minimum assistance with rolling walker  Gait: Pt amb 150ft with RW cga. Intermittent step to gait pattern. Decreased stance time on LLE.   Balance: Standing balance CGA with RW.    Therapeutic Activities/Exercises:  Pt performed 4 sit<>stands with RW Chris with cues for hand placement. Pt stood in RW while therapist performed pericare and brief change, No LOB noted.    Education:  Patient provided with verbal education education regarding PT  role/goals/POC, fall prevention, and safety awareness.  Understanding was verbalized, however additional teaching warranted.     Patient left up in chair with all lines intact, call button in reach, chair alarm on, RN  notified, and daughrer present..    GOALS:   Multidisciplinary Problems       Physical Therapy Goals          Problem: Physical Therapy    Goal Priority Disciplines Outcome Goal Variances Interventions   Physical Therapy Goal     PT, PT/OT Ongoing, Progressing     Description: Goals to be met by: 2024     Patient will increase functional independence with mobility by performin. Supine to sit with Stand-by Assistance  2. Sit to stand transfer with Stand-by Assistance  3. Bed to chair transfer with Modified [place] using Rolling Walker  4. Gait  x 150 feet with Modified [place] using Rolling Walker.                          Time Tracking:     PT Received On: 23  PT Start Time: 917     PT Stop Time: 953  PT Total Time (min): 36 min     Billable Minutes: Gait Training 20 and Therapeutic Activity 16    Treatment Type: Treatment  PT/PTA: PTA     Number of PTA visits since last PT visit: 3     2023

## 2023-12-21 NOTE — PT/OT/SLP PROGRESS
Occupational Therapy   Treatment    Name: Chichi Mckee  MRN: [id number]  Admitting Diagnosis:  Stroke       Recommendations:     Recommended therapy intensity at discharge: High Intensity Therapy   Discharge Equipment Recommendations:  to be determined by next level of care  Barriers to discharge:       Assessment:     Chichi Mckee is a 89 y.o. male with a medical diagnosis of Stroke.  He presents with the following performance deficits affecting function are weakness, impaired endurance, impaired self care skills, impaired functional mobility, gait instability, impaired balance, impaired cognition, decreased safety awareness.   Pt with good tolerance to therapy session today, motivated to participate. Pt performed grooming at the sink with Min A for set up, Min A for balance at RW, and moderate cueing for RW mgmt and positioning at sink for safety. Pt required Min A during functional mobility for RW mgmt navigating small space/turning near sink.    Rehab Prognosis:  Good; patient would benefit from acute skilled OT services to address these deficits and reach maximum level of function.       Plan:     Patient to be seen 5 x/week to address the above listed problems via self-care/home management, therapeutic activities, therapeutic exercises  Plan of Care Expires: 01/18/24  Plan of Care Reviewed with: patient, daughter    Subjective     Pain/Comfort:  Pain Rating 1: 0/10    Objective:     Communicated with: RN prior to session.  Patient found up in chair with blood pressure cuff, peripheral IV, pulse ox (continuous), telemetry, Other (comments) (chair alarm) upon OT entry to room.    General Precautions: Standard, fall, hearing impaired    Orthopedic Precautions:N/A  Braces: N/A  Respiratory Status: Room air  Vital Signs: Blood Pressure: 132/67 (RN aware of BP<140-160 and Ok'd to mobilize)  HR: 77     Occupational Performance:     Functional Mobility/Transfers:  Patient completed Sit <> Stand Transfer with minimum  assistance  with  rolling walker   Functional Mobility: Min A during functional mobility using RW, assist needed for RW mgmt and balance navigating turns    Activities of Daily Living:  Grooming: minimum assistance oral hygiene standing at sink    Therapeutic Positioning    OT interventions performed during the course of today's session in an effort to prevent and/or reduce acquired pressure injuries:   Education was provided on benefits of and recommendations for therapeutic positioning    Patient Education:  Patient provided with verbal education education regarding OT role/goals/POC, fall prevention, and safety awareness.  Understanding was verbalized.      Patient left up in chair with all lines intact, call button in reach, chair alarm on, and pt's daughter present    GOALS:   Multidisciplinary Problems       Occupational Therapy Goals          Problem: Occupational Therapy    Goal Priority Disciplines Outcome Interventions   Occupational Therapy Goal     OT, PT/OT Ongoing, Progressing    Description: Goals to be met by: 1/18/24     Patient will increase functional independence with ADLs by performing:    UE Dressing with Supervision.  LE Dressing with Supervision.  Grooming while standing at sink with Supervision.  Toileting from toilet with Supervision for hygiene and clothing management.   Toilet transfer to toilet with Supervision.                         Time Tracking:     OT Date of Treatment: 12/21/23  OT Start Time: 1025  OT Stop Time: 1041  OT Total Time (min): 16 min    Billable Minutes:Self Care/Home Management 16 mins    OT/EM: OT     Number of EM visits since last OT visit: 2    12/21/2023

## 2023-12-21 NOTE — CONSULTS
Chief Complaint  CVA (acute ischemic changes in the left posterior frontal and temporal lobes; left vertebral artery is occluded proximally)    Reason for Consultation  Physiatry    History of Present Illness  Admit MD: Alvarez Zayas MD  Consult Physiatry: Wilder Pham MD  HPI:  89 year old BM with a PMH of HTN, HLD, PAD, aortic stenosis, bladder mass, Very [place], and BPH presented to the ED as a Code Fast on 12/16/2023 with complaints of slurred speech and RUE weakness and numbness starting at 11:00 a.m. that morning.  CT head was negative.  Lab work revealed BRIAN.  He was given TNK at 12:48 p.m. and admitted to ICU. 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 There is a long segment area of multifocal narrowing in the basilar artery which could represent vasospasm or areas of multifocal narrowing.  60-70% stenosis in the proximal left internal carotid artery secondary to plaque.  No large vessel occlusion is seen.  Prelim echo showed EF 55-60%, negative bubble study.  24 hr repeat CT head showed no acute intracranial hemorrhage.  MRI brain without contrast showed patchy acute ischemic changes in the left posterior frontal and temporal lobes.  Moderate chronic microvascular ischemic changes. Patient kept in ICU additional 24 hours for frequent neuro checks. Patient originally going to undergo DSA, but family decided against it, due to patient's age. VS have remained stable, BRIAN resolved.  The patient was deemed suitable for transfer to the floor. Care was transferred to hospital medicine for further management. On 12/20, BP ranging 139/69- 187/93, HR ranging 54-81 bpm. Amlodipine increased to 10mg due to elevated BP. Started Atorvastatin 20mg daily per neuro. PT/OT evals completed with deficits noted with recommendation for high intensity therapy needed. Speech swallowing eval showed no signs of aspiration so recommended regular diet. Patient is AAOx3; on standard aspiration  "precautions.   Participating with therapy. Functional status includes moderate assist needed for transfers using RW, moderate assist for balance while standing for grooming task of brushing teeth, CGA for walking 230 ft with RW, minimal assist for bed mobility, moderate assist for lower body dressing, and moderate assist for toileting. Patient was evaluated, accepted, and admitted to inpatient rehab to improve functional status. Transferred to [facility] on 12/21 without incident.     12/26: Seen in patient room, seated in recliner following lunch. [place]. Hearing aids in place. Reports good sleep and appetite. States that bowels are moving "slowly". Believes he had a BM yesterday. Denies pain. Therapy evaluating and treating accordingly. VSSAF.         Review of Systems  Barriers to Discharge:  Social: Completed 3rd grade. He was in the [employer]. 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 Per the patients son, the family is nearby. Their daughter lives with them. The family takes care of the household and yard work and manages finances and medications. They will have assistance from the family after the rehab stay. Children: (4).    Medical: Ischemic CVA with right-sided weakness, HLD, VHD, HTN, BPH, history of bladder mass      Functional:    Prior Level of Fx: Needed supervision with lower body dressing and bathing for safety. He uses a rollator for mobility. He does not drive. He does ride his riding  around the yard at times. Sander does the yard work. He does not have a medic alert. The patients son is interested in some information about this.    Residence: Pt. Lives with his spouse and daughter in [place] in a single-story home with a ramp with bilateral rails to enter the residence. He has a tub/shower combination with grab bars, a tub bench, and a HHS. Pt. Has an elevated toilet. No grab bars.   DME: Rollator, tub bench, HHS, grab bars, and wheelchair.   Psychiatric: " Denies mental health and substance abuse history.     Depression/Anxiety: no complaints     ALPRAZolam tablet 0.25 mg TID PRN Anxiety  Pain: denies     acetaminophen tablet 650 mg q4h PRN mild pain  traMADoL tablet 50 mg q8h PRN mod/severe pain  Bowels/Bladder: last BM 12/25  Appetite: good     Sleep: good                Physical Exam  General: well-developed, well-nourished, in no acute distress, [place]  Eye: EOMI, clear conjunctiva, eyelids normal  HENT: normocephalic, oropharynx and nasal mucosal surfaces moist  Neck: full range of motion, supple  Respiratory: equal chest rise, no SOB, no audible wheeze  Cardiovascular: regular rate and rhythm, no edema  Gastrointestinal: soft, non-tender, non-distended   Musculoskeletal: right sided weakness  Integumentary: no rashes or skin lesions present  Neurologic: cranial nerves intact, right sided weakness  *MD performed and documented physical examination         Labs:   Latest Reference Range & Units 12/25/23 05:28   WBC 4.50 - 11.50 x10(3)/mcL 5.62   RBC 4.70 - 6.10 x10(6)/mcL 4.76   Hemoglobin 14.0 - 18.0 g/dL 13.5 (L)   Hematocrit 42.0 - 52.0 % 42.1   MCV 80.0 - 94.0 fL 88.4   MCH 27.0 - 31.0 pg 28.4   MCHC 33.0 - 36.0 g/dL 32.1 (L)   RDW 11.5 - 17.0 % 13.6   Platelet Count 130 - 400 x10(3)/mcL 254   MPV 7.4 - 10.4 fL 11.1 (H)   Neut % % 59.5   LYMPH % % 16.4   Mono % % 12.5   Eosinophil % % 10.3   Basophil % % 0.9   Immature Granulocytes % 0.4   Neut # 2.1 - 9.2 x10(3)/mcL 3.35   Lymph # 0.6 - 4.6 x10(3)/mcL 0.92   Mono # 0.1 - 1.3 x10(3)/mcL 0.70   Eos # 0 - 0.9 x10(3)/mcL 0.58   Baso # <=0.2 x10(3)/mcL 0.05   Immature Grans (Abs) 0 - 0.04 x10(3)/mcL 0.02   nRBC % 0.0   Sodium 132 - 146 mmol/L 137   Potassium 3.5 - 5.1 mmol/L 4.1   Chloride 98 - 111 mmol/L 109   CO2 23 - 31 mmol/L 22 (L)   BUN 8.4 - 25.7 mg/dL 17.9   Creatinine 0.73 - 1.18 mg/dL 1.14   eGFR mls/min/1.73/m2 60   Glucose 75 - 121 mg/dL 93   Calcium 8.8 - 10.0 mg/dL 9.7   Phosphorus Level 2.3 - 4.7 mg/dL  2.8   Magnesium  1.60 - 2.60 mg/dL 1.90   ALP 40 - 150 unit/L 76   PROTEIN TOTAL 5.8 - 7.6 gm/dL 6.4   Albumin 3.4 - 4.8 g/dL 2.8 (L)   Albumin/Globulin Ratio 1.1 - 2.0 ratio 0.8 (L)   Prealbumin 16.0 - 42.0 mg/dL 10.7 (L)   BILIRUBIN TOTAL <=1.5 mg/dL 0.5   AST 5 - 34 unit/L 18   ALT 0 - 55 unit/L 23   Globulin, Total 2.4 - 3.5 gm/dL 3.6 (H)   (L): Data is abnormally low  (H): Data is abnormally high   Latest Reference Range & Units 12/22/23 05:24   Iron 65 - 175 ug/dL 40 (L)   TIBC 250 - 450 ug/dL 168 (L)   Iron Binding Capacity Unsaturated 69 - 240 ug/dL 128   Transferrin mg/dL 145   Ferritin 21.81 - 274.66 ng/mL 343.18 (H)   Iron Saturation 20 - 50 % 24   (L): Data is abnormally low  (H): Data is abnormally high        Diagnostics:  CT HEAD WITHOUT CONTRAST   Impression:  Chronic age-related changes.  No change since prior examination  Old appearing area of ischemia in the left posterior parietal region at the cerebral convexity  Date:                                            12/16/2023  Time:                                           16:29      CTA STROKE MULTI-PHASE   Impression:  The left artery is occluded proximally.  There is a long segment area of multifocal narrowing in the basilar artery which could represent vaso spasm or areas of multifocal narrowing.  60-70% stenosis in the proximal left internal carotid artery secondary to plaque  No large vessel occlusion seen  A 50% stenosis in the supraclinoid right internal carotid artery  Left thyroid nodules  Date:                                            12/16/2023  Time:                                           13:12      CT HEAD WITHOUT CONTRAST   Impression:  No acute intracranial hemorrhage.  Date:                                            12/17/2023  Time:                                           12:56      MRI BRAIN WITHOUT CONTRAST   Impression:  1. Patchy acute ischemic changes in the left posterior frontal and temporal lobes.  2. Moderate  chronic microvascular ischemic changes.  Date:                                            12/18/2023  Time:                                           10:28        Assessment/Plan  Hospital   Stroke   Right sided weakness     Non-Hospital   Bladder mass   Essential hypertension   HLD (hyperlipidemia)   VHD (valvular heart disease)   BPH (benign prostatic hyperplasia)       Wounds: none noted  Precautions: fall risk  Bracing: RW  Swallowing: Regular Diet  Function: Tolerating therapy. Continue PT/OT  VTE Prophylaxis: SCDs  Code Status: FULL CODE   Discharge:Lives with his spouse and daughter in [place] in a single-story home with a ramp with bilateral rails to enter the residence. Completed 3rd grade. He was in the [employer]. 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 Per the patients son, the family is nearby. Their daughter lives with them. The family takes care of the household and yard work and manages finances and medications. They will have assistance from the family after the rehab stay. Children: (4).  Date 1/5 Friday.   Dos 12/27/23  I have evaluated the patient on initial IRF admit. I have been involved in rehab prescreen. I have reviewed records.I have reviewed admit orders.I have discussed case with IM team.  I find the patient appropriate for, in need of and should tolerate an aggressive IRF program with good potential for functional improvement.  I am in agreement with initial Plan of Care  I have been involved in the creation of this initial PM&R consult with Anyi Dockery NP, conducted additional independent physical examination and assisted with medical documentation.

## 2023-12-22 LAB
ALBUMIN SERPL-MCNC: 3 G/DL (ref 3.4–4.8)
ALBUMIN/GLOB SERPL: 0.8 RATIO (ref 1.1–2)
ALP SERPL-CCNC: 77 UNIT/L (ref 40–150)
ALT SERPL-CCNC: 17 UNIT/L (ref 0–55)
AST SERPL-CCNC: 17 UNIT/L (ref 5–34)
BASOPHILS # BLD AUTO: 0.05 X10(3)/MCL
BASOPHILS NFR BLD AUTO: 0.9 %
BILIRUB SERPL-MCNC: 1.2 MG/DL
BUN SERPL-MCNC: 13.4 MG/DL (ref 8.4–25.7)
CALCIUM SERPL-MCNC: 9.3 MG/DL (ref 8.8–10)
CHLORIDE SERPL-SCNC: 110 MMOL/L (ref 98–111)
CO2 SERPL-SCNC: 21 MMOL/L (ref 23–31)
CREAT SERPL-MCNC: 1.12 MG/DL (ref 0.73–1.18)
EOSINOPHIL # BLD AUTO: 0.41 X10(3)/MCL (ref 0–0.9)
EOSINOPHIL NFR BLD AUTO: 7.2 %
ERYTHROCYTE [DISTWIDTH] IN BLOOD BY AUTOMATED COUNT: 13.5 % (ref 11.5–17)
FERRITIN SERPL-MCNC: 343.18 NG/ML (ref 21.81–274.66)
GFR SERPLBLD CREATININE-BSD FMLA CKD-EPI: >60 MLS/MIN/1.73/M2
GLOBULIN SER-MCNC: 3.6 GM/DL (ref 2.4–3.5)
GLUCOSE SERPL-MCNC: 101 MG/DL (ref 75–121)
HCT VFR BLD AUTO: 43.8 % (ref 42–52)
HGB BLD-MCNC: 14.3 G/DL (ref 14–18)
IMM GRANULOCYTES # BLD AUTO: 0.01 X10(3)/MCL (ref 0–0.04)
IMM GRANULOCYTES NFR BLD AUTO: 0.2 %
IRON SATN MFR SERPL: 24 % (ref 20–50)
IRON SERPL-MCNC: 40 UG/DL (ref 65–175)
LYMPHOCYTES # BLD AUTO: 0.74 X10(3)/MCL (ref 0.6–4.6)
LYMPHOCYTES NFR BLD AUTO: 13 %
MAGNESIUM SERPL-MCNC: 1.8 MG/DL (ref 1.6–2.6)
MCH RBC QN AUTO: 28.7 PG (ref 27–31)
MCHC RBC AUTO-ENTMCNC: 32.6 G/DL (ref 33–36)
MCV RBC AUTO: 87.8 FL (ref 80–94)
MONOCYTES # BLD AUTO: 0.7 X10(3)/MCL (ref 0.1–1.3)
MONOCYTES NFR BLD AUTO: 12.3 %
NEUTROPHILS # BLD AUTO: 3.79 X10(3)/MCL (ref 2.1–9.2)
NEUTROPHILS NFR BLD AUTO: 66.4 %
NRBC BLD AUTO-RTO: 0 %
PHOSPHATE SERPL-MCNC: 2.5 MG/DL (ref 2.3–4.7)
PLATELET # BLD AUTO: 231 X10(3)/MCL (ref 130–400)
PMV BLD AUTO: 11.4 FL (ref 7.4–10.4)
POTASSIUM SERPL-SCNC: 3.2 MMOL/L (ref 3.5–5.1)
PREALB SERPL-MCNC: 9.9 MG/DL (ref 16–42)
PROT SERPL-MCNC: 6.6 GM/DL (ref 5.8–7.6)
RBC # BLD AUTO: 4.99 X10(6)/MCL (ref 4.7–6.1)
SODIUM SERPL-SCNC: 141 MMOL/L (ref 132–146)
TIBC SERPL-MCNC: 128 UG/DL (ref 69–240)
TIBC SERPL-MCNC: 168 UG/DL (ref 250–450)
TRANSFERRIN SERPL-MCNC: 145 MG/DL
WBC # SPEC AUTO: 5.7 X10(3)/MCL (ref 4.5–11.5)

## 2023-12-22 PROCEDURE — 11800000 HC REHAB PRIVATE ROOM

## 2023-12-22 PROCEDURE — 97110 THERAPEUTIC EXERCISES: CPT

## 2023-12-22 PROCEDURE — 25000003 PHARM REV CODE 250: Performed by: NURSE PRACTITIONER

## 2023-12-22 PROCEDURE — 83540 ASSAY OF IRON: CPT | Performed by: NURSE PRACTITIONER

## 2023-12-22 PROCEDURE — 85025 COMPLETE CBC W/AUTO DIFF WBC: CPT | Performed by: NURSE PRACTITIONER

## 2023-12-22 PROCEDURE — 97162 PT EVAL MOD COMPLEX 30 MIN: CPT

## 2023-12-22 PROCEDURE — 97530 THERAPEUTIC ACTIVITIES: CPT

## 2023-12-22 PROCEDURE — 84100 ASSAY OF PHOSPHORUS: CPT | Performed by: NURSE PRACTITIONER

## 2023-12-22 PROCEDURE — 83735 ASSAY OF MAGNESIUM: CPT | Performed by: NURSE PRACTITIONER

## 2023-12-22 PROCEDURE — 84134 ASSAY OF PREALBUMIN: CPT | Performed by: NURSE PRACTITIONER

## 2023-12-22 PROCEDURE — 82728 ASSAY OF FERRITIN: CPT | Performed by: NURSE PRACTITIONER

## 2023-12-22 PROCEDURE — 97535 SELF CARE MNGMENT TRAINING: CPT

## 2023-12-22 PROCEDURE — 99900035 HC TECH TIME PER 15 MIN (STAT)

## 2023-12-22 PROCEDURE — 80053 COMPREHEN METABOLIC PANEL: CPT | Performed by: NURSE PRACTITIONER

## 2023-12-22 PROCEDURE — 97166 OT EVAL MOD COMPLEX 45 MIN: CPT

## 2023-12-22 PROCEDURE — 92523 SPEECH SOUND LANG COMPREHEN: CPT

## 2023-12-22 RX ORDER — POTASSIUM CHLORIDE 20 MEQ/1
40 TABLET, EXTENDED RELEASE ORAL ONCE
Status: DISCONTINUED | OUTPATIENT
Start: 2023-12-22 | End: 2023-12-22

## 2023-12-22 RX ORDER — POTASSIUM CHLORIDE 20 MEQ/1
40 TABLET, EXTENDED RELEASE ORAL 2 TIMES DAILY
Status: COMPLETED | OUTPATIENT
Start: 2023-12-22 | End: 2023-12-22

## 2023-12-22 RX ADMIN — DOCUSATE SODIUM 100 MG: 100 CAPSULE, LIQUID FILLED ORAL at 08:12

## 2023-12-22 RX ADMIN — POTASSIUM CHLORIDE 40 MEQ: 1500 TABLET, EXTENDED RELEASE ORAL at 09:12

## 2023-12-22 RX ADMIN — DOCUSATE SODIUM 100 MG: 100 CAPSULE, LIQUID FILLED ORAL at 09:12

## 2023-12-22 RX ADMIN — TAMSULOSIN HYDROCHLORIDE 0.4 MG: 0.4 CAPSULE ORAL at 08:12

## 2023-12-22 RX ADMIN — FINASTERIDE 5 MG: 5 TABLET, FILM COATED ORAL at 09:12

## 2023-12-22 RX ADMIN — POTASSIUM CHLORIDE 40 MEQ: 1500 TABLET, EXTENDED RELEASE ORAL at 08:12

## 2023-12-22 RX ADMIN — POLYETHYLENE GLYCOL 3350 17 G: 17 POWDER, FOR SOLUTION ORAL at 04:12

## 2023-12-22 RX ADMIN — AMLODIPINE BESYLATE 5 MG: 5 TABLET ORAL at 09:12

## 2023-12-22 NOTE — PT/OT/SLP EVAL
Recreational Therapy Evaluation      Date of Treatment: 12/22/23  Start Time: 1300  Stop Time: 1330  Total Time: 30 min  Missed Time:    Assessment      Chichi Mckee is a 89 y.o. male admitted with a medical diagnosis of Stroke.  He presents with the following impairments/functional limitations:  weakness, impaired endurance, gait instability, impaired functional mobility, impaired balance, visual deficits, impaired cognition, decreased coordination, decreased upper extremity function, decreased lower extremity function, decreased safety awareness, decreased ROM, impaired coordination, impaired fine motor .    Rehab Diagnosis:     Recent Surgery:     General Precautions: Standard, fall, hearing impaired     Orthopedic Precautions:N/A     Braces: N/A    Rehab Prognosis: Good; patient would benefit from acute skilled Recreational Therapy services to address these deficits and reach maximum level of function.      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    Home Environment     Admit Date: 12/21/23  Living Situation  People in Home: spouse, child(anai), adult  Name(s) of People in Home: Awa Mckee  Lives in: house  Patients Responsibilities: Retired, Leisure/play/hobbies  Number of Children: 4  Occupation:Retired:  and  "    Instrumental Activities of Daily Living     Previous Hand Dominance: Right Current Hand Dominance: Right (RUE weakness)     Other iADL Information: R UE weakness       Cognitive Skills Building         Cognitive Observation Activity Assist Position Equipment Response            Comment:      Dynamic Activities      Activity Assist Position Equipment Response   Activity 1 Bowling moderate assistance Standing Rolling walker and Rubber bowling balls good   Comment: Sit to stand was mod as was dynamic standing balance/reaching. Standing tolerance was 3 minutes with rest breaks.  RUE coordination was max.  Hand over hand assist needed for motor planning of RUE.  Hearing deficits interfere with participation.  Cooperative       Fine Motor Activities      Activity Assist Position Equipment Response           Comment:        Goals     Patient Goals  Patient Goal 1: "To walk better."    Short Term Goals    Goal  Goal Status   Will increase sit to stand to supervision Initiated   Will improved dynamic standing balance/reaching to supervision Initiated   Will increase RUE coordination/dexteriety to min Initiated           Long Term Goals    Goal Goal Status   Will increase standing tolerance to 5 minutes Initiated   Will improve dynamic standing balance/reaching to setup Initiated   Will increase RUE coordination/dexteriety to supervision Initiated               Plan       Patient to be seen: Daily  Duration: 3 weeks  Treatments planned: Balance training, Coordination, Energy conservation training, Fine motor, Safety education  Treatment plan/goals established with Patient/Caregiver: Yes     "

## 2023-12-22 NOTE — PT/OT/SLP EVAL
"[facility] - Rehabilitation Unit  Speech Language Pathology Department  Cognitive-Communication Evaluation    Patient Name:  Chichi Mckee   MRN:  [id number]    Recommendations:     General recommendations:  SLP intervention not indicated  Communication strategies:  hearing aids    Subjective     Patient awake, alert, and cooperative.    Patient goals: "to get better"     Spiritual/Cultural/Sikh Beliefs/Practices that affect care:  no  Pain/Comfort:  0/10  Respiratory Status: room air    Objective:     SPEECH PRODUCTION  Phoneme Production: adequate  Voice Quality: adequate  Voice Production: adequate  Speech Rate: appropriate  Loudness: acceptable  Respiration: WFL for speech  Resonance: adequate  [place]: adequate  Speech Intelligibility  Known Context: Greater that 90%  Unknown Context: Greater that 90%    AUDITORY COMPREHENSION  Following Directions:  1-Step: Within Functional Limits  2-Step: Within Functional Limits  Yes/No Questions:  Biographical: Within Functional Limits  Environmental: Within Functional Limits  Simple: Within Functional Limits  Complex: Within Functional Limits    VERBAL EXPRESSION  Automatic Speech:  Days of the week: Within Functional Limits  Months of the year: Within Functional Limits  Counting: Within Functional Limits  Alphabet: Within Functional Limits  Phrase Completion: Within Functional Limits  Confrontation Naming  Body Parts: Within Functional Limits  Objects: Within Functional Limits  Wh- Questions:  Object name: Within Functional Limits  Object function: Within Functional Limits    COGNITION  Orientation:  Person: yes  Place: yes  Time: yes  Situation: yes   Attention:  Focused: Within Functional Limits  Sustained: Within Functional Limits  Memory:  Immediate: Within Functional Limits  Short Term: Within Functional Limits  Long Term: Within Functional Limits  Problem Solving  Functional simple: Within Functional Limits    Assessment: "     Pt presents with functional speech and language skills. Cognitive linguistic skills are at baseline per family report. Skilled SLP intervention is not indicated at this time. Please reconsult as warranted.     Patient Education:     Patient provided with verbal education regarding SLP POC.  Understanding was verbalized.    Plan:     SLP Follow-Up:   no   Plan of Care reviewed with:   patient      Time Tracking:     SLP Treatment Date:   12/22/23  Speech Start Time:  1030  Speech Stop Time:  1100     Speech Total Time (min):  30 min    Billable minutes:  Evaluation of Speech Sound Production with Comprehension and Expression, 30 minutes     12/22/2023

## 2023-12-22 NOTE — PT/OT/SLP PROGRESS
Physical Therapy Inpatient Rehab Treatment    Patient Name:  Chichi Mckee   MRN:  [id number]    Recommendations:     Discharge Recommendations:  Low Intensity Therapy   Discharge Equipment Recommendations:     Barriers to discharge: Impaired functional mobility     Assessment:     Chichi Mckee is a 89 y.o. male admitted with a medical diagnosis of Stroke.  He presents with the following impairments/functional limitations:  weakness, impaired endurance, impaired functional mobility, impaired self care skills, gait instability, impaired cognition, decreased lower extremity function, pain, decreased safety awareness .    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 Pt. Has a past medical history of HTN, HLD, PAD, aortic stenosis, bladder mass, BPH, and very [place]    General Precautions: Standard, hearing impaired     Orthopedic Precautions:N/A     Braces: N/A    Rehab Prognosis: Good; patient would benefit from acute skilled PT services to address these deficits and reach maximum level of function.      History:     Past Medical History:   Diagnosis Date    Hypertension        No past surgical history on file.    Subjective     Patient comments: n/a    Respiratory Status: Room air    Patients cultural, spiritual, Anabaptism conflicts given the current situation:      Objective:     Communicated with rn prior to session.  Patient found up in chair with peripheral IV  upon PT entry to room.        Vitals   Vitals at Rest  /65   HR 73   O2 Sat    Pain      Vitals With Activity  BP (!) 145/71   HR 71   O2 Sat     Pain         Functional Mobility:        Current   Status  Discharge   Goal   Functional Area: Care Score:    Roll Left and Right  Independent   Sit to Lying  Independent   Lying to Sitting on Side of Bed  Independent   Sit to Stand 3  W/rw and mod asssit Set-up/clean-up   Chair/Bed-to-Chair Transfer 3  W/rw and mod assist  Set-up/clean-up    Car Transfer  Set-up/clean-up   Walk 10 Feet  Set-up/clean-up   Walk 50 Feet with Two Turns  Set-up/clean-up   Walk 150 Feet  Set-up/clean-up   Walk 10 Feet Uneven Surface  Supervision or touching assistance   1 Step (Curb) 3  Preformed 4in curb w/rw and mod assist for safety. Pt required increase vc for technique.  Supervision or touching assistance   4 Steps  Not applicable   12 Steps  Not applicable   Picking Up Object  Independent   Wheel 50 Feet with Two Turns  Not applicable   Wheel 150 Feet  Not applicable       Therapeutic Activities and Exercises:  Pt propelled wc 150ft w/ BUE, for UE strengthening.     Activity Tolerance: Good    Patient left up in chair with all lines intact and call button in reach.    Education provided: gait training and strengthening exercises    Expected compliance: Moderate compliance    Plan:     During this hospitalization, patient to be seen 5 x/week (5-7 x week) to address the identified rehab impairments via gait training, therapeutic activities, therapeutic exercises, neuromuscular re-education, wheelchair management/training and progress toward the following goals:    GOALS:   Multidisciplinary Problems       Physical Therapy Goals          Problem: Physical Therapy    Goal Priority Disciplines Outcome Goal Variances Interventions   Physical Therapy Goal     PT, PT/OT Ongoing, Progressing     Description: Bed Mobility:  Roll left and right with setup/clean-up assist.  Sit to supine transfer with setup/clean-up assist.  Supine to sit transfer with setup/clean-up assist.    Transfers:  Sit to stand transfer with setup/clean-up assist using RW.  Bed to chair transfer with setup/clean-up assist Stand Step  using RW.  Car transfer with supervision/touching assist using RW.   an object from the ground in standing position with supervision/touching assist using RW.    Mobility:  Ambulate 150 feet with supervision/touching assist using RW.  Ambulate 15 feet on uneven  surfaces/ramps with supervision/touching assist using RW.  Pt ascended/descended a 4 inch curb with supervision/touching assist using RW.  Ascend/descend 4 stairs with supervision/touching assist using bilateral handrails.                        Plan of Care Expires:  12/28/23  PT Next Visit Date: 12/28/23  Plan of Care reviewed with: patient    Additional Information:         Time Tracking:     Therapy Time  PT Received On: 12/22/23  PT Start Time: 1400  PT Stop Time: 1430  PT Total Time (min): 30 min   PT Individual: 30  Missed Time:    Time Missed due to:      Billable Minutes: Therapeutic Activity 30    12/22/2023

## 2023-12-22 NOTE — PT/OT/SLP EVAL
"Occupational Therapy Inpatient Rehab Evaluation    Name: Chichi Mckee  MRN: [id number]    Recommendations:     Discharge Recommendations:  Low Intensity Therapy   Discharge Equipment Recommendations: bedside commode   Barriers to discharge:  impairing hearing, impaired functional mobility, decreased safety awareness    Assessment:  Chichi Mckee is a 89 y.o. male admitted with a medical diagnosis of Stroke.  He presents with the following impairments/functional limitations:  weakness, impaired endurance, impaired sensation, impaired self care skills, impaired functional mobility, gait instability, impaired balance, decreased coordination, decreased upper extremity function, decreased lower extremity function, decreased safety awareness, impaired coordination, impaired fine motor, other (comment) ([place]).    General Precautions: Standard, hearing impaired     Orthopedic Precautions:N/A     Braces: N/A    Rehab Prognosis: Good; patient would benefit from acute skilled OT services to address these deficits and reach maximum level of function.      History:     Past Medical History:   Diagnosis Date    Hypertension        No past surgical history on file.    Subjective     Orientation: Oriented x4    Chief Complaint: none    Patient/Family Comments/goals: "I want to go home."    Respiratory Status: Room air    Patients cultural, spiritual, Scientology conflicts given the current situation: no       Living Environment   Living Environment  People in Home: spouse  Name(s) of People in Home: Awa Mckee  Living Arrangements: house  Home Accessibility: wheelchair accessible  Number of Stairs, Main Entrance: none  Home Layout: Able to live on 1st floor  Living Environment Comment: To enter on ramp  Equipment Currently Used at Home: bath bench, walker, rolling, grab bar  Shower Setup: Tub/Shower combo with TTB and HHS    Prior Level of Function  BADL: Supervision or Set-up Assistance    IADL: Maximum Assistance    Equipment " used at home: bath bench, walker, rolling, grab bar.  DME owned (not currently used): none.      Upon discharge, patient will have assistance from wife and dtr.    Objective:     Patient found up in chair with peripheral IV  upon OT entry to room.    Mobility   Patient completed:  Sit to Stand Transfer with minimum assistance with rolling walker  Stand to Sit Transfer with minimum assistance with rolling walker  Toilet Transfer Step Transfer technique with moderate assistance with  rolling walker  Tub Transfer Step Transfer technique with moderate assistance with rolling walker    Functional Mobility   In room mobility with ADLs with RW with assist needed with RW with turns and facilitation with RLE advancement.    ADLs     Current Status   Eating 5   Oral Hygiene 4 Standing at sink with RW   Shower, Bathe Self 2 Assist with BLE and buttock and LUE   Upper Body Dressing 3 Min assist to pull down back   Lower Body Dressing 2 Max assist to thread BLE and pull over hips   Toileting Hygiene 3 Mod assist to pull up/down over hips   Toilet Transfer 3 Mod assist to navigate turns with RW and RLE facilitation   Putting On, Taking Off Footwear 1 dep to doff/don     Limiting Factors for ADLs: motor, sensory, endurance, limited ROM, balance, weakness, perceptual, cognition, and safety awareness    Exams     ROM:          -       WFL    Hand Dominance: Right    ROM Hand  Left Hand: WFL  Right Hand: impaired finger flexion and extension    Strength  Overall Strength:          -       WFL     Strength:   MMT is WFL but weak in Right with functional grasp    Pinch Strength:   MMT is WFL but weak in Right with functional grasp    Sensation  Left:          -       WNL  Right:          -       Deficits, Upper   Upper Extremity  Comment   Light Touch Impaired    Sharp/Dull Impaired    Stereognosis Impaired    Proprioception Impaired        Coordination:      -       Impaired  Right hand thumb/finger opposition skills   and Right  hand, manipulation of objects      Tone  Left: WNL  Right: WNL    Visual/Perceptual  Intact    Cognition:   WFL but very [place]    Balance    Sitting  Sitting Surface: TTB  Static: No UE Support, Good (-)  Dynamic: One UE Extremity, Fair (-)    Standing  Static: Bilateral UE Extremity Support, Fair (+)  Dynamic: Bilateral UE extremity support, Fair (-)    Patient left up in chair with all lines intact, call button in reach, and chair alarm on.    Education provided: Roles and goals of OT, ADLs, transfer training, sequencing, safety precautions, and fall prevention    Multidisciplinary Problems       Occupational Therapy Goals          Problem: Occupational Therapy    Goal Priority Disciplines Outcome Interventions   Occupational Therapy Goal     OT, PT/OT Ongoing, Progressing    Description: ADLs:  Pt to perform oral care tasks with set up while standing at sink  Pt to perform UB dressing with SPV.  Pt to perform LB dressing with Partial mod assist with AE as needed.   Pt to perform putting on/off footwear task with Partial mod with AE as needed.  Pt to perform toileting with SPV.  Pt to perform bathing with Partial mod assist with AE as needed.    Functional Transfers:  Pt to perform toilet transfers with incidental touching.  Pt to perform a tub transfer with incidental touching.    Balance, Strengthening, Endurance, Balance:  Pt to consistently demonstrate adherence to orthopedic precautions during all ADL's as instructed by OT.  Pt to demonstrate good dynamic standing balance as required to perform ADL's from standing level.  Pt to demonstrate good BUE strength during functional task   Pt to demonstrate consistent adherence to breathing control and energy conservation techniques as educated by OT.                        Plan     During this hospitalization, patient to be seen 5 x/week to address the identified rehab impairments via self-care/home management, therapeutic activities, therapeutic exercises,  neuromuscular re-education, sensory integration, cognitive retraining and progress toward the following goals:    Plan of Care Expires:  12/28/23    Time Tracking     OT Received On: 12/22/23  Time In 0800     Time Out 0930  Total Time 90 min  Therapy Time: OT Individual: 90  Missed Time:    Missed Time Reason:      Billable Minutes: Evaluation 30 and Self Care/Home Management 40    12/22/2023

## 2023-12-22 NOTE — PLAN OF CARE
Chichi Mckee age: 89 *right sided weakness, [place]   Dx: Acute left posterior frontal and temporal lobe CVA s/p TNK with right upper extremity weakness. Pt. Has a past medical history of HTN, HLD, PAD, aortic stenosis, bladder mass, BPH, and very [place]. *See chart for full and complete medical history*     Hx mental health/substance abuse: Denies mental health and substance abuse history.   Is there evidence of an acute change in mental status from the patients baseline? No   Insurance: Medicare Part [facility]B/[facility]   Education//Work Hx: Pt. Completed 3rd grade. He was in the [employer]. 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   Pt. Is  to [place]don[place]. She still cooks. Per the patients son, the family is nearby. Their daughter lives with them. The family takes care of the household and yard work and manages finances and medications. They will have assistance from the family after the rehab stay.   Children: (4) /Friends/Family: 1) Sander Mckee son ([phone]) 2) Awa Mckee, daughter ([phone])    Power of  (yes-Sander Mckee) /Living Will (no)   Prior Level of Fx: Needed supervision with lower body dressing and bathing for safety. He uses a rollator for mobility. He does not drive. He does ride his riding  around the yard at times. Sander does the yard work. He does not have a medic alert. The patients son is interested in some information about this.    Residence: Pt. Lives with his spouse and daughter in [place] in a single-story home with a ramp with bilateral rails to enter the residence. He has a tub/shower combination with grab bars, a tub bench, and a HHS. Pt. Has an elevated toilet. No grab bars.   DME: Rollator, tub bench, HHS, grab bars, and wheelchair. [facility].    HH/OP TX: He has a history of HH but does not remember the name of the company. Son would like to decide with his siblings.    FOC obtained [facility] DME per [facility].    [facility]   / (does have prescription drug coverage)    MD(s): [facility]: [facility] ([facility] Reyes) Needs follow-up in 1 week. Neurologist: Dr. Tyron Rahman ([phone]) He needs follow-up in the stroke clinic in 6 weeks.

## 2023-12-22 NOTE — H&P
[facility] ([facility])  Rehab Admission H&P    Patient Name: Chichi Mckee  MRN: [id number]  Age: 89 y.o. 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  PMH significant for  HTN, HLD, PAD, aortic stenosis, history of bladder mass, and BPH.  Workup significant for CVA.  Code fast initiated.  CT head negative.  T and K initiated at 12:48 p.m..  Admitted to ICU.  CTA head and neck significant for left vertebral artery occluded proximally.  LVEF  55-60% with negative bubble study.  Repeat CT head negative for intracranial hemorrhage.  MRI brain with contrast significant for patchy acute ischemic changes in the left posterior frontal and temporal lobes.  Diagnostic angiogram planned with Interventional Neurology on , but family declined due to advanced age.  Neurology recommended daily aspirin 81 mg daily and Lipitor 20 mg daily.  Tolerated transfer to [facility]  inpatient rehab unit on  without incident.      Sitting up in chair.  Reports good sleep and appetite.  Last BM .  Very hard of hearing.  BP 1 40s - 150s.  CBC unremarkable.  Potassium 3.2.  Recent imaging reported below.    Past Medical History: Ischemic CVA with right-sided weakness, HLD, VHD, HTN, BPH, history of bladder mass  Procedure history: None  Family History:  unable to obtain  Social History:   (-) TOB.     (-) ETOH.   (-) Illicit drug use.    Completed 3rd grade.  Served in the [employer].  Retired.  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  .  Prior level of function:   Needed supervision with lower body dressing and bathing for safety.  He  uses a Rollator for mobility.  He does not drive.  He does ride his riding  around the yd at times.  Sander does the yd work.    Residence:  lives with spouse and daughter in [place] in a single-story home with a ramp and bilateral railing to enter the residence.  He has a tub/shower combination with grab bars.  He has a tub bench and hand-held shower.  He has an elevated toilet.  No grab bars adjacent.  DME:   Rollator, tub bench, hand-held shower, grab bars, and wheelchair  Anticipated discharge destination:  home with home health    Review of patient's allergies indicates:   Allergen Reactions    Hydrocodone-acetaminophen         Current Facility-Administered Medications:     acetaminophen tablet 650 mg, 650 mg, Oral, Q4H PRN, Nitin, Nimesh A, FNP    ALPRAZolam tablet 0.25 mg, 0.25 mg, Oral, TID PRN, Nitin, Nimesh A, FNP    amLODIPine tablet 5 mg, 5 mg, Oral, Daily, Nitin, Nimesh A, FNP    [START ON 12/23/2023] aspirin EC tablet 81 mg, 81 mg, Oral, Daily, Nitin, Nimesh A, FNP    [START ON 12/23/2023] atorvastatin tablet 20 mg, 20 mg, Oral, Daily, Nitin, Nimesh A, FNP    benzonatate capsule 100 mg, 100 mg, Oral, TID PRN, Nitin, Nimesh A, FNP    bisacodyL suppository 10 mg, 10 mg, Rectal, Daily PRN, Nitin, Nimesh A, FNP    docusate sodium capsule 100 mg, 100 mg, Oral, BID, Nitin, Nimesh A, FNP, 100 mg at 12/21/23 2056    finasteride tablet 5 mg, 5 mg, Oral, Daily, Nitin, Nimesh A, FNP    hydrALAZINE injection 10 mg, 10 mg, Intravenous, Q4H PRN, Nitin, Nimesh A, FNP    hydrOXYzine pamoate capsule 50 mg, 50 mg, Oral, Nightly PRN, Nitin, Nimesh A, FNP    labetalol 20 mg/4 mL (5 mg/mL) IV syring, 10 mg, Intravenous, Q4H PRN, Nitin, Nimesh A, FNP    metoprolol injection 10 mg, 10 mg, Intravenous, Q2H PRN, Nimesh Taveras A, FNP    nitroGLYCERIN SL tablet 0.4 mg, 0.4 mg, Sublingual, Q5 Min PRN, Nimesh Taveras A, FNP    ondansetron disintegrating  "tablet 4 mg, 4 mg, Oral, Q6H PRN, Nitin, Nimesh A, FNP    ondansetron disintegrating tablet 8 mg, 8 mg, Oral, Q6H PRN, Nitin, Nimesh A, FNP    polyethylene glycol packet 17 g, 17 g, Oral, BID PRN, Nitin, Nimesh A, FNP    potassium chloride SA CR tablet 40 mEq, 40 mEq, Oral, Once, Nitin, Nimesh A, FNP    promethazine tablet 25 mg, 25 mg, Oral, Q6H PRN, Nitin, Nimesh A, FNP    tamsulosin 24 hr capsule 0.4 mg, 0.4 mg, Oral, QHS, Nitin, Nimesh A, FNP, 0.4 mg at 12/21/23 2056    traMADoL tablet 50 mg, 50 mg, Oral, Q8H PRN, Nitin, Nimesh A, FNP     Review of Systems   Complete 12-point review of symptoms negative except for what's mentioned in HPI     Objective:     BP (!) 145/73   Pulse 67   Temp 98 °F (36.7 °C) (Oral)   Resp 20   Ht 5' 10" (1.778 m)   Wt 79.5 kg (175 lb 4.3 oz)   SpO2 98%   BMI 25.15 kg/m²     Physical Exam  Constitutional:       Appearance: Normal appearance.   HENT:      Head: Normocephalic.      Ears:      Comments: [place]     Mouth/Throat:      Mouth: Mucous membranes are moist.   Eyes:      Pupils: Pupils are equal, round, and reactive to light.   Cardiovascular:      Rate and Rhythm: Normal rate and regular rhythm.      Heart sounds: Murmur heard.      Systolic murmur is present with a grade of 3/6.   Pulmonary:      Effort: Pulmonary effort is normal.      Breath sounds: Normal breath sounds.   Abdominal:      General: Bowel sounds are normal.      Palpations: Abdomen is soft.   Musculoskeletal:      Cervical back: Neck supple.      Comments:  Right-sided weakness   Skin:     General: Skin is warm and dry.   Neurological:      Mental Status: He is alert and oriented to person, place, and time.      Motor: Weakness present.   Psychiatric:         Mood and Affect: Mood normal.         Behavior: Behavior normal.         Thought Content: Thought content normal.         Judgment: Judgment normal.     *MD performed and documented physical examination   "     Lines/Drains/Airways       None                   Labs  Admission on 12/21/2023   Component Date Value Ref Range Status    Sodium Level 12/22/2023 141  132 - 146 mmol/L Final    Potassium Level 12/22/2023 3.2 (L)  3.5 - 5.1 mmol/L Final    Chloride 12/22/2023 110  98 - 111 mmol/L Final    Carbon Dioxide 12/22/2023 21 (L)  23 - 31 mmol/L Final    Glucose Level 12/22/2023 101  75 - 121 mg/dL Final    Blood Urea Nitrogen 12/22/2023 13.4  8.4 - 25.7 mg/dL Final    Creatinine 12/22/2023 1.12  0.73 - 1.18 mg/dL Final    Calcium Level Total 12/22/2023 9.3  8.8 - 10.0 mg/dL Final    Protein Total 12/22/2023 6.6  5.8 - 7.6 gm/dL Final    Albumin Level 12/22/2023 3.0 (L)  3.4 - 4.8 g/dL Final    Globulin 12/22/2023 3.6 (H)  2.4 - 3.5 gm/dL Final    Albumin/Globulin Ratio 12/22/2023 0.8 (L)  1.1 - 2.0 ratio Final    Bilirubin Total 12/22/2023 1.2  <=1.5 mg/dL Final    Alkaline Phosphatase 12/22/2023 77  40 - 150 unit/L Final    Alanine Aminotransferase 12/22/2023 17  0 - 55 unit/L Final    Aspartate Aminotransferase 12/22/2023 17  5 - 34 unit/L Final    eGFR 12/22/2023 >60  mls/min/1.73/m2 Final    Magnesium Level 12/22/2023 1.80  1.60 - 2.60 mg/dL Final    Phosphorus Level 12/22/2023 2.5  2.3 - 4.7 mg/dL Final    Prealbumin 12/22/2023 9.9 (L)  16.0 - 42.0 mg/dL Final    WBC 12/22/2023 5.70  4.50 - 11.50 x10(3)/mcL Final    RBC 12/22/2023 4.99  4.70 - 6.10 x10(6)/mcL Final    Hgb 12/22/2023 14.3  14.0 - 18.0 g/dL Final    Hct 12/22/2023 43.8  42.0 - 52.0 % Final    MCV 12/22/2023 87.8  80.0 - 94.0 fL Final    MCH 12/22/2023 28.7  27.0 - 31.0 pg Final    MCHC 12/22/2023 32.6 (L)  33.0 - 36.0 g/dL Final    RDW 12/22/2023 13.5  11.5 - 17.0 % Final    Platelet 12/22/2023 231  130 - 400 x10(3)/mcL Final    MPV 12/22/2023 11.4 (H)  7.4 - 10.4 fL Final    Neut % 12/22/2023 66.4  % Final    Lymph % 12/22/2023 13.0  % Final    Mono % 12/22/2023 12.3  % Final    Eos % 12/22/2023 7.2  % Final    Basophil % 12/22/2023 0.9  % Final     Lymph # 12/22/2023 0.74  0.6 - 4.6 x10(3)/mcL Final    Neut # 12/22/2023 3.79  2.1 - 9.2 x10(3)/mcL Final    Mono # 12/22/2023 0.70  0.1 - 1.3 x10(3)/mcL Final    Eos # 12/22/2023 0.41  0 - 0.9 x10(3)/mcL Final    Baso # 12/22/2023 0.05  <=0.2 x10(3)/mcL Final    IG# 12/22/2023 0.01  0 - 0.04 x10(3)/mcL Final    IG% 12/22/2023 0.2  % Final    NRBC% 12/22/2023 0.0  % Final       Radiology  MRI brain without contrast on 12/18/2023, IMPRESSION: Patchy acute ischemic changes in the left posterior frontal and temporal lobes. Moderate chronic microvascular ischemic changes.    Assessment/Plan:     89 y.o. AAM admitted on 12/21/2023    Ischemic CVA   - to left posterior frontal and temporal lobes with right sided weakness  - continue    Aspirin 81 mg daily    Lipitor 20 mg daily   - Consult physiatry for rehab and pain management  - PT/OT/RT/ST to eval and treat    PAD  - stable   - continue    Aspirin 81 mg daily    Lipitor 20 mg daily     HLD  -  FLP outpatient with PCP  - continue    Lipitor 20 mg daily      VHD  -  aortic stenosis  -  to follow-up with cardiology outpatient    HTN  -  BP moderately controlled  - continue    Norvasc 5 mg daily     Hydralazine 10 mg every 2 hours as needed for BP > 160/90    Labetalol 10 mg every 2 hours as needed for BP > 160/90  - low sodium diet    Constipation  - stable   - continue  Colace 100 mg BID   Miralax 17 gm BID PRN    BPH  - stable  - continue    Finasteride 5 mg daily     Flomax 0.4 mg at bedtime    Hypokalemia  -  potassium 3.2  - initiate    Potassium 40 mEq x2 doses     MEDICAL PLAN:  - Admit to [facility]  - Medical reconciliation completed and included in the electronic health record  - Draw admit labs including CBC, CMP, and Prealbumin  - Maintain weightbearing status as ordered  - Monitor postoperative surgical incision changing dressing daily  - Teach skin protection and wound prevention techniques  - Initiate fall  precaution  - Initiate bowel training program  - Initiate bladder training as indicated  - Pain management  - IS q2 hours while awake    THERAPEUTIC PLAN:  - Physical therapy 60-90 minutes 5 to week to increase functional mobility, maintain weightbearing precautions, increase lower extremity restrengthening, increase overall activity tolerance, teach balance and safety measures as well as fall precautions, make equipment and orthotic recommendations, be involved in family training and discharge planning, monitor pain levels and vital signs during therapy adjusting treatment accordingly  - Occupational therapy 60-90 minutes 5 times a week to increase functional independence with activities of daily living, maintain weightbearing precautions, teach use of adaptive equipment, increase upper extremity restrengthening, increase overall activity tolerance, teach balance and safety measures as well as fall precautions, make equipment and orthotic recommendations, be involved in family training and discharge planning, monitor pain levels and vital signs during therapy adjusting treatment accordingly  - Speech and language pathology will do an in-depth evaluation of patient's cognition, phonation, and swallowing. They will initiate therapy 30- 60 minutes 5 times a week as indicated  - Recreational therapy will incorporate all patient's functional abilities  into recreational activities that will require visual, spatial, and perceptual abilities; gross and fine motor coordination skills; the cognition to follow multistep commands; dynamic and static balance and reaching abilities. They will also incorporate animal assisted therapy into their weekly program  - Rehabilitation nursing will act as patient family physician liaison. They will integrate patient's functional abilities, after discussions with therapist, into everyday activities with the CNA's,  LPN's and RNs that will include but are not limited to dressing, bathing,  feeding, grooming, toileting, transfers, hygiene etc. They will administer medications watching for wanted as well as unwanted effects. They will initiate DVT/VTE prophylaxis as ordered. Will monitor vital signs, lab values, and pain levels, making appropriate physician notification. They will monitor skin integrity including postop incision, making daily dressing changes. They will teach skin protection and wound prevention techniques. They will initiate bowel training They will initiate bladder training as indicated. They will initiate fall precautions  - Rehabilitation counseling/case management will act as patient and family liaison. They will set up family conferences, family training, aid in discharge planning, and aid in the procurement of assistive devices  - Patient will have 24 hour availability to physiatric care  - Patient will have nutritional services involved, monitoring oral input and visceral protein stores. They will make dietary and supplement recommendations and follow-up as necessary  - Patient will have weekly interdisciplinary team conferences  - Patient will have initial family conference and follow up conferences as indicated  - Patient will have family training prior to discharge     Estimated length of stay - 14-17 days  Anticipated discharge destination - Home with   Medical status - stabilizing postoperatively; will need to monitor pain levels, postoperative skin integrity, watch for evidence of deep vein thrombosis, monitor postoperative H&H, monitor vital signs closely.  Medical prognosis - Good for post-op healing and functional improvement  Previous functional Status:  independent to supervision  Present Functional Status:  Min to mod assist    VTE Prophylaxis:  SCDs  COVID-19 testing:  unknown  COVID-19 vaccination status:  vaccinated    POA: No  Living will: No  Contacts: Awa Mckee (dtr) [phone]      CODE STATUS: Full Code  Internal Medicine (attending): Alvarez Knowles  MD Marquez  Physiatry (consulting):  Wilder Pham MD    OUTPATIENT PROVIDERS    [facility]P:  [facility]    Neurology:  Tyron Rahman MD    DISPOSITION: Condition stable. Tolerated transfer.  Recent labs and imaging reviewed.  Rehab admission orders initiated.  Med reconciliation completed.  Consult physiatry for rehab and pain management.  PT/OT/RT/ST to eval and treat.  Sleep hygiene, bowel maintenance, and appetite at goal.  BP moderately controlled.  Replace potassium.  Continue current POC.  Monitor closely.  Notify of changes.      PHYSICIAN ATTESTATION: I have been involved in the patient's rehabilitation prescreening process and I have now completed the post admission physician evaluation. Patient is in need of, appropriate for, and should tolerate the above outlined inpatient rehabilitation plan. I believe this is necessary to reach maximal postoperative healing and maximal functional abilities. I do not believe this would be possible in a timely fashion in a less intensive setting      Slava Taveras NP conducted independent physical examination and assisted with medical documentation.    Total time spent on this encounter including chart review and direct MD + NP 1-on-1 patient interaction: 94 minutes   Over 50% of this time was spent in counseling and coordination of care

## 2023-12-22 NOTE — PROGRESS NOTES
"   12/22/23 1300   Rec Therapy Time Calculation   Date of Treatment 12/22/23   Rec Start Time 1300   Rec Stop Time 1330   Rec Total Time (min) 30 min   Time   Treatment time 2 units   Charges   $Therapeutic Exercise 2 units   Precautions   General Precautions fall;hearing impaired   Orthopedic Precautions  N/A   Braces N/A   Pain/Comfort   Pain Rating 1 no pain   Vital Signs   Pulse 78   BP (!) 159/79   OTHER   Treatment Diagnosis Acute L posterior frontal and temporal lobe CVA, TNK, R UE weakness, HTN, HLD, PAD, aortic stenosis, bladder mass, BPH, [place]   Rehab identified problem list/impairments weakness;impaired endurance;gait instability;impaired functional mobility;impaired balance;visual deficits;impaired cognition;decreased coordination;decreased upper extremity function;decreased lower extremity function;decreased safety awareness;decreased ROM;impaired coordination;impaired fine motor   Values/Beliefs/Spiritual Care   Spiritual, Cultural Beliefs, Spiritism Practices, Values that Affect Care no   Prior Living/ADLs   Admit Date 12/21/23   People in Home spouse;child(anai), adult   Living Arrangements house   Patient responsibilites: Retired;Leisure/play/hobbies   Number of Children 4   Occupation Retired:  and    Previous Hand Domiance Right   Current Hand Dominance Right  (RUE weakness)   Overall Level of Functioning   Activity Tolerance Independent   Dynamic Sitting Balance/Reaching Mod Indep   Dynamic Standing Balance/Reaching Mod A   Right UE Coodination/Dexterity Mod A   Left UE Coordination/Dexterity Standby Assist   Problem Solving/Sequencing Skills Standby Assist   Memory Recall Standby Assist   R/L Neglect/Inattention Standby assist   Attention Span Standby Assist   Social Interaction Standby Assist   Patient Goals   Patient Goal 1 "To walk better."   Recreational Therapy Short Term Goals   Short Term Goal 1 Will increase sit to stand to supervision   Short Term Goal 1 Progression " Initiated   Short Term Goal 2 Will improved dynamic standing balance/reaching to supervision   Short Term Goal 2 Progression Initiated   Short Term Goal 3 Will increase RUE coordination/dexteriety to min   Short Term Goal 3 Progression Initiated   Recreational Therapy Long Term Goals   Long Term Goal 1 Will increase standing tolerance to 5 minutes   Long Term Goal 1 Progression Initiated   Long Term Goal 2 Will improve dynamic standing balance/reaching to setup   Long Term Goal 2 Progression Initiated   Long Term Goal 3 Will increase RUE coordination/dexteriety to supervision   Long Term Goal 3 Progression Initiated   Plan   Patient to be seen Daily   Planned Duration 3 weeks   Treatments Planned Balance training;Coordination;Energy conservation training;Fine motor;Safety education   Treatment plan/goals estblished with Patient/Caregiver Yes

## 2023-12-22 NOTE — PLAN OF CARE
Problem: Occupational Therapy  Goal: Occupational Therapy Goal  Description: ADLs:  Pt to perform oral care tasks with set up while standing at sink  Pt to perform UB dressing with SPV.  Pt to perform LB dressing with Partial mod assist with AE as needed.   Pt to perform putting on/off footwear task with Partial mod with AE as needed.  Pt to perform toileting with SPV.  Pt to perform bathing with Partial mod assist with AE as needed.    Functional Transfers:  Pt to perform toilet transfers with incidental touching.  Pt to perform a tub transfer with incidental touching.    Balance, Strengthening, Endurance, Balance:  Pt to consistently demonstrate adherence to orthopedic precautions during all ADL's as instructed by OT.  Pt to demonstrate good dynamic standing balance as required to perform ADL's from standing level.  Pt to demonstrate good BUE strength during functional task   Pt to demonstrate consistent adherence to breathing control and energy conservation techniques as educated by OT.   Outcome: Ongoing, Progressing

## 2023-12-22 NOTE — CONSULTS
Inpatient Nutrition Assessment    Admit Date: 12/21/2023   Total duration of encounter: 1 day   Patient Age: 89 y.o.    Nutrition Recommendation/Prescription     Continue current diet as tolerated.   Add Boost Original (240 kcal, 10 g pro/svg) BID to assist pt in meeting est nutrition needs  RD to complete physical assessment at f/u.     Communication of Recommendations:  EMR    Nutrition Assessment     Malnutrition Assessment/Nutrition-Focused Physical Exam  To be completed at f/u     A minimum of two characteristics is recommended for diagnosis of either severe or non-severe malnutrition.    Chart Review    Reason Seen: physician consult for New rehab admit     Malnutrition Screening Tool Results   Have you recently lost weight without trying?: No  Have you been eating poorly because of a decreased appetite?: No   MST Score: 0   Diagnosis:  Left posterior frontal and temporal lobe CVA s/p TNK with rt upper extremity weakness  Hypertension and Stroke  left vertebral artery occlusion  Dysarthria  Aphasia  hyperlipidemia,  PAD  moderate aortic stenosis  bladder mass  BPH   acute kidney injury     Relevant Medical History:   HTN, HLD, PAD, aortic stenosis, bladder mass, [place], and BPH       Scheduled Medications:  amLODIPine, 5 mg, Daily  [START ON 12/23/2023] aspirin, 81 mg, Daily  [START ON 12/23/2023] atorvastatin, 20 mg, Daily  docusate sodium, 100 mg, BID  finasteride, 5 mg, Daily  potassium chloride, 40 mEq, BID  tamsulosin, 0.4 mg, QHS    Continuous Infusions:   PRN Medications: acetaminophen, ALPRAZolam, benzonatate, bisacodyL, hydrALAZINE, hydrOXYzine pamoate, labetalol, metoprolol, nitroGLYCERIN, ondansetron, ondansetron, polyethylene glycol, promethazine, tramadol    Calorie Containing IV Medications: no significant kcals from medications at this time    Recent Labs   Lab 12/16/23  1243 12/16/23  1307 12/17/23  0118 12/18/23  0255 12/19/23  0253 12/22/23  0524   NA  --  139 141 140 138 141   K  --  3.8 4.1  "3.9 3.7 3.2*   CALCIUM  --  9.9 9.5 9.6 9.1 9.3   PHOS  --   --  2.6 2.0* 2.3 2.5   MG  --   --  1.90 2.00 1.80 1.80   CHLORIDE  --  111 112* 111 113* 110   CO2  --  20* 21* 20* 19* 21*   BUN  --  16.0 15.6 12.7 11.3 13.4   CREATININE  --  1.41* 1.11 1.10 1.10 1.12   EGFRNORACEVR  --  46 >60 >60 >60 >60   GLUCOSE  --  90 105 112 115 101   BILITOT  --  0.7 0.9 0.6 0.8 1.2   ALKPHOS  --  83 86 97 84 77   ALT  --  11 11 12 11 17   AST  --  12 12 18 16 17   ALBUMIN  --  3.4 3.3* 3.2* 3.0* 3.0*   PREALB  --   --   --   --   --  9.9*   TRIG  --  71  --   --   --   --    WBC  --  3.48* 5.14 7.19 7.20 5.70   HGB  --  14.9 14.5 15.0 14.3 14.3   HCT 47 45.8 46.0 46.7 44.2 43.8     Nutrition Orders:  Diet Adult Regular      Appetite/Oral Intake: good/% of meals  Factors Affecting Nutritional Intake:  missing teeth  per SLP note   Food/Congregational/Cultural Preferences: none reported  Food Allergies: no known food allergies  Last Bowel Movement: 12/19/23  Wound(s):  None documented     Comments    12/22: Pt sleeping, attempted to rouse pt mult times. Unable to rouse. Noted pt passed MBS on (12/21) on regular/thin liquids. Appetite is good, staff documented pt ate 75% breakfast this am. Also with good appetite at main campus prior to admit ~75% x 5 meals documented.  LBM on (12/19) - colace on board. Noted large wt discrepancy per chart review. See wt hx below. Suspect wt from (12/16) inaccurate as appears was carried over from (6/29/22).     Anthropometrics    Height: 5' 10" (177.8 cm), Height Method: Stated  Last Weight: 79.5 kg (175 lb 4.3 oz) (12/21/23 1717),    BMI (Calculated): 25.1  BMI Classification: normal (BMI 18.5-24.9)        Ideal Body Weight (IBW), Male: 166 lb                                Usual Weight Provided By: EMR weight history    Wt Readings from Last 5 Encounters:   12/21/23 79.5 kg (175 lb 4.3 oz)   12/16/23 89.8 kg (198 lb)   06/29/22 89.8 kg (198 lb)     Weight Change(s) Since Admission: N/A  Wt " Readings from Last 1 Encounters:   12/21/23 1717 79.5 kg (175 lb 4.3 oz)   Admit Weight: 79.5 kg (175 lb 4.3 oz) (12/21/23 1717),      Estimated Needs    Weight Used For Calorie Calculations: 79.5 kg (175 lb 4.3 oz)  Energy Calorie Requirements (kcal): 1873  Energy Need Method: Sharkey-St Jeor (x 1.3 SF)  Weight Used For Protein Calculations: 79.5 kg (175 lb 4.3 oz)  Protein Requirements: 119 (1.5 g/kg)  Fluid Requirements (mL): 1873    Enteral Nutrition     Patient not receiving enteral nutrition at this time.    Parenteral Nutrition     Patient not receiving parenteral nutrition support at this time.    Evaluation of Received Nutrient Intake    Calories: meeting estimated needs  Protein: meeting estimated needs    Patient Education     Not applicable.    Nutrition Diagnosis     PES:  N/A related to  N/A as evidenced by N/A. (new)       Nutrition Interventions     Intervention(s): general/healthful diet    Goal: Meet greater than 80% of nutritional needs by follow-up. (new)  Goal: Maintain weight throughout hospitalization. (new)    Nutrition Goals & Monitoring     Dietitian will monitor: food and beverage intake, weight, weight change, electrolyte/renal panel, and glucose/endocrine profile    Nutrition Risk/Follow-Up: low (follow-up in 5-7 days)   Please consult if re-assessment needed sooner.

## 2023-12-22 NOTE — PT/OT/SLP EVAL
Physical Therapy Rehab Evaluation    Patient Name:  Chichi Mckee   MRN:  [id number]    Recommendations:     Discharge Recommendations:  Low Intensity Therapy   Discharge Equipment Recommendations: bedside commode   Barriers to discharge: Increased level of assist, Inaccessible home, Impaired functional mobility , and Hard of hearing    Assessment:     Chichi Mckee is a 89 y.o. male admitted with a medical diagnosis of Stroke.  He presents with the following impairments/functional limitations:  weakness, impaired endurance, impaired sensation, impaired self care skills, impaired functional mobility, gait instability, impaired balance, decreased upper extremity function, decreased lower extremity function, decreased safety awareness, pain, abnormal tone, impaired coordination .    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 Pt. Has a past medical history of HTN, HLD, PAD, aortic stenosis, bladder mass, BPH, and very [place]    General Precautions: Standard, hearing impaired     Orthopedic Precautions: N/A     Braces: N/A    Rehab Prognosis: Good; patient would benefit from acute skilled PT services to address these deficits and reach maximum level of function.      History:     Past Medical History:   Diagnosis Date    Hypertension        No past surgical history on file.    Subjective     Patient Goal: To get better     Patient Comments: N/A    Patients cultural, spiritual, Judaism conflicts given the current situation: no       Living Environment  People in Home: spouse, child(anai), adult  Name(s) of People in Home: Awa Mckee  Living Arrangements: house  Home Accessibility: wheelchair accessible  Number of Stairs, Main Entrance: none  Home Layout: Able to live on 1st floor  Living Environment Comment: To enter on ramp  Equipment Currently Used at Home: bath bench, walker, rolling, grab bar    Prior Level of Function  Ambulation Skills: needs  device  Stairs: unable to perform  Transfer Skills: needs device  ADL Skills: needs device    Equipment used at home: rollator.  DME owned (not currently used): none.      Upon discharge, patient will have assistance from wife and daughter. Limited understanding of PT commands 2/2 [facility]    Objective:     Communicated with RN prior to session.  Patient found up in chair with peripheral IV  upon PT entry to room.    Vitals   Vitals at Rest  BP (!) 148/72   HR 69   O2 Sat     Pain Pain Rating 1: 0/10     Vitals With Activity  BP (!) 151/71   HR 71   O2 Sat     Pain Pain Rating 1: 0/10     Respiratory Status: Room air    Exams  Cognitive Exam:  Patient is oriented to Person, Place, and Time  Sensation:          -       WNL  RLE Strength:          -       WFL  LLE Strength:          -       WFL    Functional Mobility    GGs   Admit Current   Status Goal   Functional Area: Care Score: Care Score: Care Score:   Roll Left and Right 4  Independent   Sit to Lying 4  Independent   Lying to Sitting on Side of Bed 4  Independent   Sit to Stand 3 Min A for stability and proper hand placement  Set-up/clean-up   Chair/Bed-to-Chair Transfer 3 Min A  Set-up/clean-up   Car Transfer 88 Attempted to step into car. PT noted decreased safety. PT then educated pt on proper tech and pt was able to perform with ModA with sit<>stand  Set-up/clean-up   Walk 10 Feet 3  Set-up/clean-up   Walk 50 Feet with Two Turns 3 ~96' with RW overall Min A at with Rue on RW for safety. CGA to trunk. No R foot drag at times  Set-up/clean-up   Walk 150 Feet 88  Set-up/clean-up   Walk 10 Feet Uneven Surface 3 On ramp with RW overall Mod A  Supervision or touching assistance   1 Step (Curb) 88  Supervision or touching assistance   4 Steps 9  Not applicable   12 Steps 9  Not applicable   Picking Up Object 88 Pt states he would reach down a grab with out assistance. PT deemed unsafe at this time. Independent   Wheel 50 Feet with Two Turns 9  Not applicable   Wheel  150 Feet 9  Not applicable     Therapeutic Activities and Exercises:  Patient educated on role of acute care PT and PT POC, safety while in hospital including calling nurse for mobility, and call light usage  Patient educated about importance of OOB mobility and remaining up in chair most of the day.    Activity Tolerance: Good    Patient left with seatbelt in wheelchair with all lines intact, call button in reach, and chair alarm on.    Education Provided: roles and goals of PT/PTA, transfer training, bed mob, gait training, stair training, balance training, safety awareness, wheelchair management, strengthening exercises, and fall prevention    Expected compliance: High compliance      Plan:     During this hospitalization, patient to be seen 5 x/week (5-7 x week) to address the identified rehab impairments via gait training, therapeutic activities, therapeutic exercises, neuromuscular re-education, wheelchair management/training and progress toward the following goals:    GOALS:   Multidisciplinary Problems       Physical Therapy Goals          Problem: Physical Therapy    Goal Priority Disciplines Outcome Goal Variances Interventions   Physical Therapy Goal     PT, PT/OT Ongoing, Progressing     Description: Bed Mobility:  Roll left and right with setup/clean-up assist.  Sit to supine transfer with setup/clean-up assist.  Supine to sit transfer with setup/clean-up assist.    Transfers:  Sit to stand transfer with setup/clean-up assist using RW.  Bed to chair transfer with setup/clean-up assist Stand Step  using RW.  Car transfer with supervision/touching assist using RW.   an object from the ground in standing position with supervision/touching assist using RW.    Mobility:  Ambulate 150 feet with supervision/touching assist using RW.  Ambulate 15 feet on uneven surfaces/ramps with supervision/touching assist using RW.  Pt ascended/descended a 4 inch curb with supervision/touching assist using  RW.  Ascend/descend 4 stairs with supervision/touching assist using bilateral handrails.                        Plan of Care Expires:  12/28/23  PT Next Visit Date: 12/28/23  Plan of Care reviewed with: patient      Additional Infomation:           Time Tracking:     Therapy Time   PT Received On: 12/22/23  PT Start Time: 1100  PT Stop Time: 1200  PT Total Time (min): 60 min  PT Individual: 60  Missed Time:    Time Missed due to:      Billable Minutes: Evaluation 30 and Therapeutic Activity 30    12/22/2023

## 2023-12-23 PROCEDURE — 97530 THERAPEUTIC ACTIVITIES: CPT

## 2023-12-23 PROCEDURE — 97116 GAIT TRAINING THERAPY: CPT

## 2023-12-23 PROCEDURE — 11800000 HC REHAB PRIVATE ROOM

## 2023-12-23 PROCEDURE — 99900035 HC TECH TIME PER 15 MIN (STAT)

## 2023-12-23 PROCEDURE — 25000003 PHARM REV CODE 250: Performed by: NURSE PRACTITIONER

## 2023-12-23 PROCEDURE — 97110 THERAPEUTIC EXERCISES: CPT

## 2023-12-23 RX ORDER — AMLODIPINE BESYLATE 5 MG/1
10 TABLET ORAL DAILY
Status: DISCONTINUED | OUTPATIENT
Start: 2023-12-24 | End: 2024-01-05 | Stop reason: HOSPADM

## 2023-12-23 RX ADMIN — DOCUSATE SODIUM 100 MG: 100 CAPSULE, LIQUID FILLED ORAL at 08:12

## 2023-12-23 RX ADMIN — DOCUSATE SODIUM 100 MG: 100 CAPSULE, LIQUID FILLED ORAL at 07:12

## 2023-12-23 RX ADMIN — ASPIRIN 81 MG: 81 TABLET, COATED ORAL at 07:12

## 2023-12-23 RX ADMIN — AMLODIPINE BESYLATE 5 MG: 5 TABLET ORAL at 07:12

## 2023-12-23 RX ADMIN — FINASTERIDE 5 MG: 5 TABLET, FILM COATED ORAL at 07:12

## 2023-12-23 RX ADMIN — TAMSULOSIN HYDROCHLORIDE 0.4 MG: 0.4 CAPSULE ORAL at 08:12

## 2023-12-23 RX ADMIN — ATORVASTATIN CALCIUM 20 MG: 10 TABLET, FILM COATED ORAL at 07:12

## 2023-12-23 NOTE — PT/OT/SLP PROGRESS
Physical Therapy Inpatient Rehab Treatment    Patient Name:  Chichi Mckee   MRN:  [id number]    Recommendations:     Discharge Recommendations:  Low Intensity Therapy   Discharge Equipment Recommendations: bedside commode   Barriers to discharge:       Assessment:     Chichi Mckee is a 89 y.o. male admitted with a medical diagnosis of Stroke.  He presents with the following impairments/functional limitations:     .    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 Pt. Has a past medical history of HTN, HLD, PAD, aortic stenosis, bladder mass, BPH, and very [place]    Recent Surgery: * No surgery found *      General Precautions: Standard, hearing impaired     Orthopedic Precautions:N/A     Braces: N/A    Rehab Prognosis: Good; patient would benefit from acute skilled PT services to address these deficits and reach maximum level of function.      History:     Past Medical History:   Diagnosis Date    Hypertension        No past surgical history on file.    Subjective     Chief Complaint: Pt reports no pain at time.    Respiratory Status: Room air    Patients cultural, spiritual, Latter-day conflicts given the current situation:        Objective:     Communicated with NSG prior to session.  Patient found supine with    upon PT entry to room.    Pt is Oriented x3 and Alert, Cooperative, and Motivated.          Functional Mobility:   Bed Mobility:     Supine to Sit: Supervision or touching assistance   Sit to Supine: Supervision or touching assistance   Transfers:     Sit to Stand: Partial/moderate assistance  with rolling walker  Bed to Chair: Partial/moderate assistance  with rolling walker  using  Step Transfer  Car Transfer: Partial/moderate assistance  with  rolling walker  using  Step Transfer  Wheelchair Propulsion:  Pt propelled Standard wheelchair x 150 x2 feet on outside sidewalk with  Bilateral upper extremity with Partial/moderate assistance .       Current   Status  Discharge   Goal   Functional Area: Care Score:    Roll Left and Right   Independent   Sit to Lying 4 Independent   Lying to Sitting on Side of Bed 3 Independent   Sit to Stand 3 Set-up/clean-up   Chair/Bed-to-Chair Transfer 3 Set-up/clean-up   Car Transfer   Set-up/clean-up   Walk 10 Feet 4 Set-up/clean-up   Walk 50 Feet with Two Turns 4 Set-up/clean-up   Walk 150 Feet   Set-up/clean-up   Walk 10 Feet Uneven Surface   Supervision or touching assistance   1 Step (Curb)   Supervision or touching assistance   4 Steps   Not applicable   12 Steps   Not applicable   Picking Up Object   Independent   Wheel 50 Feet with Two Turns 3 Not applicable   Wheel 150 Feet 3 Not applicable       Therapeutic Activities and Exercises:  Sitting edge of mat pt performed balloon taps with supervision. VC for use of RUE.    Activity Tolerance: Excellent    Patient left supine with call button in reach.    Education provided: transfer training, wheelchair management, and strengthening exercises    Expected compliance: High compliance    GOALS:   Multidisciplinary Problems       Physical Therapy Goals          Problem: Physical Therapy    Goal Priority Disciplines Outcome Goal Variances Interventions   Physical Therapy Goal     PT, PT/OT Ongoing, Progressing     Description: Bed Mobility:  Roll left and right with setup/clean-up assist.  Sit to supine transfer with setup/clean-up assist.  Supine to sit transfer with setup/clean-up assist.    Transfers:  Sit to stand transfer with setup/clean-up assist using RW.  Bed to chair transfer with setup/clean-up assist Stand Step  using RW.  Car transfer with supervision/touching assist using RW.   an object from the ground in standing position with supervision/touching assist using RW.    Mobility:  Ambulate 150 feet with supervision/touching assist using RW.  Ambulate 15 feet on uneven surfaces/ramps with supervision/touching assist using RW.  Pt ascended/descended a  4 inch curb with supervision/touching assist using RW.  Ascend/descend 4 stairs with supervision/touching assist using bilateral handrails.                        Plan:     During this hospitalization, patient to be seen 5 x/week (5-7 x week) to address the identified rehab impairments via gait training, therapeutic activities, therapeutic exercises, neuromuscular re-education, wheelchair management/training and progress toward the following goals:    Plan of Care Expires:  12/28/23  PT Next Visit Date: 12/28/23  Plan of Care reviewed with: patient    Additional Information:         Time Tracking:     Therapy Time  PT Start Time: 1100  PT Stop Time: 1200  PT Total Time (min): 60 min   PT Individual: 60  Missed Time:    Time Missed due to:      Billable Minutes: Therapeutic Activity 60    12/23/2023

## 2023-12-23 NOTE — PT/OT/SLP PROGRESS
Occupational Therapy Inpatient Rehab Treatment    Name: Chichi Mckee  MRN: [id number]    Assessment:  Chichi Mckee is a 89 y.o. male admitted with a medical diagnosis of Stroke.  He presents with the following impairments/functional limitations:  weakness, impaired endurance, impaired self care skills, impaired functional mobility, gait instability, impaired balance, decreased lower extremity function, decreased upper extremity function, decreased safety awareness.    General Precautions: Standard, fall     Orthopedic Precautions:N/A     Braces: N/A    Rehab Prognosis: Good; patient would benefit from acute skilled OT services to address these deficits and reach maximum level of function.      History:     Past Medical History:   Diagnosis Date    Hypertension        No past surgical history on file.  Subjective     Orientation: Oriented x4    Chief Complaint: No complaints     Vitals   BP taken in wheelchair in OT gym at beginning of session  First read: 180/60  Second read: 173/79    Nurse notified. Patient returned back to bed.     Respiratory Status: Room air    Patients cultural, spiritual, Confucianism conflicts given the current situation: no     Objective:     Patient found supine upon OT entry to room.    Mobility   Patient completed:  Rolling/Turning to Left with stand by assistance  Rolling/Turning to Right with stand by assistance  Supine to Sit with contact guard assistance  Sit to Supine with minimum assistance  Sit to Stand Transfer with contact guard assistance with rolling walker  Stand to Sit Transfer with contact guard assistance with rolling walker  Bed to Chair Transfer using Step Transfer technique with contact guard assistance with rolling walker    Patient left supine with all lines intact, call button in reach, and nurse notified.     Education provided: Roles and goals of OT, transfer training, safety precautions, and fall prevention    Multidisciplinary Problems       Occupational Therapy  Goals          Problem: Occupational Therapy    Goal Priority Disciplines Outcome Interventions   Occupational Therapy Goal     OT, PT/OT Ongoing, Progressing    Description: ADLs:  Pt to perform oral care tasks with set up while standing at sink  Pt to perform UB dressing with SPV.  Pt to perform LB dressing with Partial mod assist with AE as needed.   Pt to perform putting on/off footwear task with Partial mod with AE as needed.  Pt to perform toileting with SPV.  Pt to perform bathing with Partial mod assist with AE as needed.    Functional Transfers:  Pt to perform toilet transfers with incidental touching.  Pt to perform a tub transfer with incidental touching.    Balance, Strengthening, Endurance, Balance:  Pt to consistently demonstrate adherence to orthopedic precautions during all ADL's as instructed by OT.  Pt to demonstrate good dynamic standing balance as required to perform ADL's from standing level.  Pt to demonstrate good BUE strength during functional task   Pt to demonstrate consistent adherence to breathing control and energy conservation techniques as educated by OT.                        Time Tracking     OT Received On: 12/23/23  Time In 1300     Time Out 1320  Total Time 20 min  Therapy Time: OT Individual: 20  Missed Time: 10 Minutes  Missed Time Reason: Nurse/ JORGE ALBERTO hold (BP out of parameters)    Billable Minutes: Therapeutic Activity 20 12/23/2023

## 2023-12-23 NOTE — PT/OT/SLP PROGRESS
Physical Therapy Inpatient Rehab Treatment    Patient Name:  Chichi Mckee   MRN:  [id number]    Recommendations:     Discharge Recommendations:  Low Intensity Therapy   Discharge Equipment Recommendations:     Barriers to discharge:  Hearing deficits and R sided neglect     Assessment:     Chichi Mckee is a 89 y.o. male admitted with a medical diagnosis of Stroke.  He presents with the following impairments/functional limitations:  weakness, impaired endurance, impaired functional mobility, impaired self care skills, gait instability, impaired cognition, decreased lower extremity function, pain, decreased safety awareness Pt is extremely [place] and demos decreased safety awareness 2/2 hearing lose. Pt also demos R sided neglect and in attention. Pt daughter present and reports that pt is 89 y/o and year of birth is actually 1928. [facility] reports that [facility]. Relayed to Nurse.     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 Pt. Has a past medical history of HTN, HLD, PAD, aortic stenosis, bladder mass, BPH, and very [place]    General Precautions: Standard, hearing impaired     Orthopedic Precautions:N/A     Braces: N/A    Rehab Prognosis: Good; patient would benefit from acute skilled PT services to address these deficits and reach maximum level of function.      History:     Past Medical History:   Diagnosis Date    Hypertension        No past surgical history on file.    Subjective     Patient comments: N/A    Respiratory Status: Room air    Patients cultural, spiritual, Jain conflicts given the current situation:      Objective:     Communicated with RN prior to session.  Patient found supine with peripheral IV  upon PT entry to room.    Pt is Oriented x3 and  Hard of hearing, Alert, Cooperative, and Motivated.    Vitals   Vitals at Rest  BP    HR    O2 Sat    Pain      Vitals With Activity  /62   HR 80   O2 Sat     Pain  Pain Rating 1: 0/10       Functional Mobility:     Standing attempt to void at toilet: Min A with doffing and donning pants. Noted soiled. Assisted with donning new brief.      Current   Status  Discharge   Goal   Functional Area: Care Score:    Roll Left and Right   Independent   Sit to Lying   Independent   Lying to Sitting on Side of Bed 3  Min A to trunk  Independent   Sit to Stand 3  Min  to Mod A to come to full stand. VC for proper tech with hand placement. Noted decreased carry over. VC throughout for safety 2/2 impaired hearing  Set-up/clean-up   Chair/Bed-to-Chair Transfer 3  Min A for safety with RW. Pt attempts to reach for surface prior to RW safely in proximity.  VC for proper tech with hand placement. Noted decreased carry over. VC throughout for safety 2/2 impaired hearing  Set-up/clean-up   Car Transfer   Set-up/clean-up   Walk 10 Feet 4 Set-up/clean-up   Walk 50 Feet with Two Turns 4  ~101' with RW overall CGA with VC for hip flexion on RLE. Noted foot drag at times.  Set-up/clean-up   Walk 150 Feet   Set-up/clean-up   Walk 10 Feet Uneven Surface   Supervision or touching assistance   1 Step (Curb)   Supervision or touching assistance   4 Steps   Not applicable   12 Steps   Not applicable   Picking Up Object   Independent   Wheel 50 Feet with Two Turns 3 Not applicable   Wheel 150 Feet 3  Min A for steering and VC for RUE involvement  Not applicable       Therapeutic Activities and Exercises:  Patient educated on role of acute care PT and PT POC, safety while in hospital including calling nurse for mobility, and call light usage  Patient educated about importance of OOB mobility and remaining up in chair most of the day.    Seated therX 10 x 3 RLE 1 # LLE 3# (step by step commands and Visual cues 2/2 hearing)    Hip flex/ abd/add    Knee ext/flex    Ankle pumps     Activity Tolerance: Good    Patient left  sitting EOB with CNA present  with all lines intact and call button in reach.    Education  provided: roles and goals of PT/PTA, transfer training, bed mob, gait training, balance training, safety awareness, body mechanics, assistive device, wheelchair management, and strengthening exercises    Expected compliance: High compliance    Plan:     During this hospitalization, patient to be seen 5 x/week (5-7 x week) to address the identified rehab impairments via gait training, therapeutic activities, therapeutic exercises, neuromuscular re-education, wheelchair management/training and progress toward the following goals:    GOALS:   Multidisciplinary Problems       Physical Therapy Goals          Problem: Physical Therapy    Goal Priority Disciplines Outcome Goal Variances Interventions   Physical Therapy Goal     PT, PT/OT Ongoing, Progressing     Description: Bed Mobility:  Roll left and right with setup/clean-up assist.  Sit to supine transfer with setup/clean-up assist.  Supine to sit transfer with setup/clean-up assist.    Transfers:  Sit to stand transfer with setup/clean-up assist using RW.  Bed to chair transfer with setup/clean-up assist Stand Step  using RW.  Car transfer with supervision/touching assist using RW.   an object from the ground in standing position with supervision/touching assist using RW.    Mobility:  Ambulate 150 feet with supervision/touching assist using RW.  Ambulate 15 feet on uneven surfaces/ramps with supervision/touching assist using RW.  Pt ascended/descended a 4 inch curb with supervision/touching assist using RW.  Ascend/descend 4 stairs with supervision/touching assist using bilateral handrails.                        Plan of Care Expires:  12/28/23  PT Next Visit Date: 12/28/23  Plan of Care reviewed with: patient    Additional Information:         Time Tracking:     Therapy Time  PT Received On: 12/23/23  PT Start Time: 0840  PT Stop Time: 1010  PT Total Time (min): 90 min   PT Individual: 90  Missed Time:    Time Missed due to:      Billable Minutes: Gait  Training 15, Therapeutic Activity 45, and Therapeutic Exercise 30    12/23/2023

## 2023-12-23 NOTE — PROGRESS NOTES
[facility])  Rehab Progress Note    Patient Name: Chichi Mckee  MRN: [id number]  Age: 89 y.o. 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  PMH significant for  HTN, HLD, PAD, aortic stenosis, history of bladder mass, and BPH.  Workup significant for CVA.  Code fast initiated.  CT head negative.  T and K initiated at 12:48 p.m..  Admitted to ICU.  CTA head and neck significant for left vertebral artery occluded proximally.  LVEF  55-60% with negative bubble study.  Repeat CT head negative for intracranial hemorrhage.  MRI brain with contrast significant for patchy acute ischemic changes in the left posterior frontal and temporal lobes.  Diagnostic angiogram planned with Interventional Neurology on , but family declined due to advanced age.  Neurology recommended daily aspirin 81 mg daily and Lipitor 20 mg daily.  Tolerated transfer to [facility]  inpatient rehab unit on  without incident.   Today's Information: No acute events overnight.  Laying in bed comfortably resting.  Family at bedside.  Vital signs overall at goal with no recent recorded fevers.  Blood pressure is moderately controlled.  Reports good sleep and appetite.  Last BM .  No labs or imaging today.    Review of patient's allergies indicates:   Allergen Reactions    Hydrocodone-acetaminophen         Current Facility-Administered Medications:     acetaminophen tablet 650 mg, 650 mg, Oral, Q4H PRN, Nitin, Nimesh A, FNP    ALPRAZolam tablet 0.25 mg, 0.25 mg, Oral, TID PRN, Nitin, Nimesh A, FNP    amLODIPine tablet 5 mg, 5  "mg, Oral, Daily, Nitin, Nimesh A, FNP, 5 mg at 12/23/23 0739    aspirin EC tablet 81 mg, 81 mg, Oral, Daily, Nitin, Nimesh A, FNP, 81 mg at 12/23/23 0739    atorvastatin tablet 20 mg, 20 mg, Oral, Daily, Nitin, Nimesh A, FNP, 20 mg at 12/23/23 0739    benzonatate capsule 100 mg, 100 mg, Oral, TID PRN, Nitin, Nimesh A, FNP    bisacodyL suppository 10 mg, 10 mg, Rectal, Daily PRN, Nitin, Nimesh A, FNP    docusate sodium capsule 100 mg, 100 mg, Oral, BID, Nitin, Nimesh A, FNP, 100 mg at 12/23/23 0739    finasteride tablet 5 mg, 5 mg, Oral, Daily, Nitin, Nimesh A, FNP, 5 mg at 12/23/23 0739    hydrALAZINE injection 10 mg, 10 mg, Intravenous, Q4H PRN, Nitin, Nimesh A, FNP    hydrOXYzine pamoate capsule 50 mg, 50 mg, Oral, Nightly PRN, Nitin, Nimesh A, FNP    labetalol 20 mg/4 mL (5 mg/mL) IV syring, 10 mg, Intravenous, Q4H PRN, Nitin, Nimesh A, FNP    metoprolol injection 10 mg, 10 mg, Intravenous, Q2H PRN, Nitin, Nimesh A, FNP    nitroGLYCERIN SL tablet 0.4 mg, 0.4 mg, Sublingual, Q5 Min PRN, Nitin, Nimesh A, FNP    ondansetron disintegrating tablet 4 mg, 4 mg, Oral, Q6H PRN, Nitin, Nimesh A, FNP    ondansetron disintegrating tablet 8 mg, 8 mg, Oral, Q6H PRN, Nitin, Nimesh A, FNP    polyethylene glycol packet 17 g, 17 g, Oral, BID PRN, Nitin, Nimesh A, FNP, 17 g at 12/22/23 1654    promethazine tablet 25 mg, 25 mg, Oral, Q6H PRN, Nitin, Nimesh A, FNP    tamsulosin 24 hr capsule 0.4 mg, 0.4 mg, Oral, QHS, Nimesh Taevras FNP, 0.4 mg at 12/22/23 2014    traMADoL tablet 50 mg, 50 mg, Oral, Q8H PRN, Nimesh Taveras FNP     Review of Systems   Complete 12-point review of symptoms negative except for what's mentioned in HPI     Objective:     BP (!) 151/69   Pulse 69   Temp 98.9 °F (37.2 °C) (Oral)   Resp 20   Ht 5' 10" (1.778 m)   Wt 79.5 kg (175 lb 4.3 oz)   SpO2 96%   BMI 25.15 kg/m²      No intake or output data in the 24 " hours ending 12/23/23 0927    Physical Exam  Vitals reviewed.   HENT:      Head:      Comments: [place]  Eyes:      Pupils: Pupils are equal, round, and reactive to light.   Cardiovascular:      Rate and Rhythm: Normal rate and regular rhythm.      Comments: Heart sounds: Murmur heard.   Systolic murmur is present with a grade of 3/6.   Pulmonary:      Effort: Pulmonary effort is normal.      Breath sounds: Normal breath sounds.   Abdominal:      General: Bowel sounds are normal.   Musculoskeletal:         General: Normal range of motion.      Comments: Right-sided weakness   Skin:     General: Skin is warm.   Neurological:      General: No focal deficit present.      Mental Status: He is alert and oriented to person, place, and time.      Motor: Weakness present.   Psychiatric:         Mood and Affect: Mood normal.         Lines/Drains/Airways       None                   Labs  No results found for this or any previous visit (from the past 24 hour(s)).    Radiology  MRI brain without contrast on 12/18/2023, IMPRESSION: Patchy acute ischemic changes in the left posterior frontal and temporal lobes. Moderate chronic microvascular ischemic changes.  Assessment/Plan:     89 y.o. AAM admitted on 12/21/2023     Ischemic CVA   - to left posterior frontal and temporal lobes with right sided weakness  - continue                Aspirin 81 mg daily                Lipitor 20 mg daily   - Consult physiatry for rehab and pain management  - PT/OT/RT/ST to eval and treat     PAD  - stable   - continue                Aspirin 81 mg daily                Lipitor 20 mg daily      HLD  -  FLP outpatient with PCP  - continue                Lipitor 20 mg daily       VHD  -  aortic stenosis  -  to follow-up with cardiology outpatient     HTN  - BP moderately controlled  - continue                Norvasc 10 mg daily (initiated 12/23)                Hydralazine 10 mg every 2 hours as needed for BP > 160/90                Labetalol 10 mg every 2  hours as needed for BP > 160/90  - low sodium diet     Constipation  - stable   - continue  Colace 100 mg BID   Miralax 17 gm BID PRN     BPH  - stable  - continue                Finasteride 5 mg daily                 Flomax 0.4 mg at bedtime     Hypokalemia  -  potassium 3.2  - s/p Potassium 40 mEq x2 doses      VTE Prophylaxis:  SCDs  COVID-19 testing:  unknown  COVID-19 vaccination status:  vaccinated     POA: No  Living will: No  Contacts: Awa Mckee (dtr) [phone]       CODE STATUS: Full Code  Internal Medicine (attending): Alvarez Zayas MD  Physiatry (consulting):  Wilder Pham MD     OUTPATIENT PROVIDERS    [facility]P:  [facility]    Neurology:  Tyron Rahman MD     DISPOSITION:  Vital signs at goal.  No labs today.  Blood pressures overall moderately controlled.  Increase Norvasc to 10 mg daily.  Monitor closely.  Bowel management, sleep hygiene, and appetite at goal.  Updated family at bedside.  Answered all questions.  Agrees with current medical plan of care.  Continues to progress well with therapies.  Monitor closely.  Notify of any acute changes.     Total time spent on this encounter including chart review and direct 1-on-1 patient interaction: 52 minutes   Over 50% of this time was spent in counseling and coordination of care

## 2023-12-24 PROCEDURE — 99900035 HC TECH TIME PER 15 MIN (STAT)

## 2023-12-24 PROCEDURE — 97530 THERAPEUTIC ACTIVITIES: CPT

## 2023-12-24 PROCEDURE — 97110 THERAPEUTIC EXERCISES: CPT

## 2023-12-24 PROCEDURE — 97535 SELF CARE MNGMENT TRAINING: CPT

## 2023-12-24 PROCEDURE — 25000003 PHARM REV CODE 250: Performed by: NURSE PRACTITIONER

## 2023-12-24 PROCEDURE — 11800000 HC REHAB PRIVATE ROOM

## 2023-12-24 PROCEDURE — 97116 GAIT TRAINING THERAPY: CPT

## 2023-12-24 PROCEDURE — 97112 NEUROMUSCULAR REEDUCATION: CPT

## 2023-12-24 RX ADMIN — TAMSULOSIN HYDROCHLORIDE 0.4 MG: 0.4 CAPSULE ORAL at 09:12

## 2023-12-24 RX ADMIN — FINASTERIDE 5 MG: 5 TABLET, FILM COATED ORAL at 07:12

## 2023-12-24 RX ADMIN — ATORVASTATIN CALCIUM 20 MG: 10 TABLET, FILM COATED ORAL at 07:12

## 2023-12-24 RX ADMIN — AMLODIPINE BESYLATE 10 MG: 5 TABLET ORAL at 07:12

## 2023-12-24 RX ADMIN — DOCUSATE SODIUM 100 MG: 100 CAPSULE, LIQUID FILLED ORAL at 07:12

## 2023-12-24 RX ADMIN — ASPIRIN 81 MG: 81 TABLET, COATED ORAL at 07:12

## 2023-12-24 RX ADMIN — DOCUSATE SODIUM 100 MG: 100 CAPSULE, LIQUID FILLED ORAL at 09:12

## 2023-12-24 NOTE — PT/OT/SLP PROGRESS
Physical Therapy Inpatient Rehab Treatment    Patient Name:  Chichi Mckee   MRN:  [id number]    Recommendations:     Discharge Recommendations:  Low Intensity Therapy   Discharge Equipment Recommendations:     Barriers to discharge:  R sided inattention     Assessment:     Chichi Mckee is a 89 y.o. male admitted with a medical diagnosis of Stroke.  He presents with the following impairments/functional limitations:  weakness, impaired endurance, impaired sensation, impaired self care skills, impaired functional mobility, gait instability, impaired balance, visual deficits, decreased upper extremity function, impaired cognition, decreased coordination, decreased lower extremity function, decreased safety awareness Daughter, grand-daughter, and family members present. Educated family on pts progress and deficits. .    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 Pt. Has a past medical history of HTN, HLD, PAD, aortic stenosis, bladder mass, BPH, and very [place]    General Precautions: Standard, hearing impaired     Orthopedic Precautions:N/A     Braces: N/A    Rehab Prognosis: Good; patient would benefit from acute skilled PT services to address these deficits and reach maximum level of function.      History:     Past Medical History:   Diagnosis Date    Hypertension        No past surgical history on file.    Subjective     Patient comments: N/A    Respiratory Status: Room air    Patients cultural, spiritual, Taoism conflicts given the current situation:      Objective:     Communicated with RN prior to session.  Patient found supine with peripheral IV  upon PT entry to room.    Pt is Oriented x3 and Alert, Cooperative, and Motivated.    Vitals   Vitals at Rest  BP    HR    O2 Sat    Pain      Vitals With Activity  BP (!) 144/63   HR 70   O2 Sat     Pain Pain Rating 1: 0/10       Functional Mobility:   Toilet t/f: pt soiled. Overall CGA to min A  for RW advancement to BSC and VC for proper tech. Pt left with CNA present in room      Current   Status  Discharge   Goal   Functional Area: Care Score:    Roll Left and Right  Independent   Sit to Lying  Independent   Lying to Sitting on Side of Bed 4  Increased time and use of bed rails. Per pts daughter, pt has bed rails at home.  Independent   Sit to Stand 4  VC for proper hand placement onRW Set-up/clean-up   Chair/Bed-to-Chair Transfer 3  Overall CGA to Min A with RW. Min A to assist with proper RW placement. Pt attempts to sit prior to being to full turn and safe distance.  Set-up/clean-up   Car Transfer  Set-up/clean-up   Walk 10 Feet 4 Set-up/clean-up   Walk 50 Feet with Two Turns 4  ~50' overall CGA with VC for R hip flexion for improved clearance. Pt distance limited 2/2 needing to void.  Set-up/clean-up   Walk 150 Feet  Set-up/clean-up   Walk 10 Feet Uneven Surface   Supervision or touching assistance   1 Step (Curb)   Supervision or touching assistance   4 Steps   Not applicable   12 Steps   Not applicable   Picking Up Object   Independent   Wheel 50 Feet with Two Turns   Not applicable   Wheel 150 Feet   Not applicable       Therapeutic Activities and Exercises:  Patient educated on role of acute care PT and PT POC, safety while in hospital including calling nurse for mobility, and call light usage  Patient educated about importance of OOB mobility and remaining up in chair most of the day.    Activity Tolerance: Good    Patient left  sitting on BSC   with all lines intact, call button in reach, and CNA present.    Education provided: roles and goals of PT/PTA, transfer training, bed mob, gait training, and strengthening exercises    Expected compliance: High compliance    Plan:     During this hospitalization, patient to be seen 5 x/week (5-7 x week) to address the identified rehab impairments via gait training, therapeutic activities, therapeutic exercises, neuromuscular re-education, wheelchair  management/training and progress toward the following goals:    GOALS:   Multidisciplinary Problems       Physical Therapy Goals          Problem: Physical Therapy    Goal Priority Disciplines Outcome Goal Variances Interventions   Physical Therapy Goal     PT, PT/OT Ongoing, Progressing     Description: Bed Mobility:  Roll left and right with setup/clean-up assist.  Sit to supine transfer with setup/clean-up assist.  Supine to sit transfer with setup/clean-up assist.    Transfers:  Sit to stand transfer with setup/clean-up assist using RW.  Bed to chair transfer with setup/clean-up assist Stand Step  using RW.  Car transfer with supervision/touching assist using RW.   an object from the ground in standing position with supervision/touching assist using RW.    Mobility:  Ambulate 150 feet with supervision/touching assist using RW.  Ambulate 15 feet on uneven surfaces/ramps with supervision/touching assist using RW.  Pt ascended/descended a 4 inch curb with supervision/touching assist using RW.  Ascend/descend 4 stairs with supervision/touching assist using bilateral handrails.                        Plan of Care Expires:  12/28/23  PT Next Visit Date: 12/28/23  Plan of Care reviewed with: patient    Additional Information:         Time Tracking:     Therapy Time  PT Received On: 12/24/23  PT Start Time: 1300  PT Stop Time: 1330  PT Total Time (min): 30 min   PT Individual: 30  Missed Time:    Time Missed due to:      Billable Minutes: Gait Training 10 and Therapeutic Activity 20    12/24/2023

## 2023-12-24 NOTE — PLAN OF CARE
Problem: Rehabilitation ([facility]) Plan of Care  Goal: Plan of Care Review  Outcome: Ongoing, Progressing  Flowsheets (Taken 12/23/2023 1950)  Plan of Care Reviewed With: patient  Goal: Absence of New-Onset Illness or Injury  Outcome: Ongoing, Progressing  Intervention: Prevent Fall and Fall Injury  Flowsheets (Taken 12/23/2023 1950)  Safety Promotion/Fall Prevention:   assistive device/personal item within reach   bed alarm set   diversional activities provided   Fall Risk reviewed with patient/family   Fall Risk signage in place   high risk medications identified   medications reviewed   lighting adjusted   nonskid shoes/socks when out of bed   side rails raised x 2   instructed to call staff for mobility   gait belt with ambulation  Intervention: Prevent Skin Injury  Flowsheets (Taken 12/23/2023 1950)  Skin Protection:   adhesive use limited   incontinence pads utilized   transparent dressing maintained   tubing/devices free from skin contact  Intervention: Prevent Infection  Flowsheets (Taken 12/23/2023 1950)  Infection Prevention:   environmental surveillance performed   equipment surfaces disinfected   hand hygiene promoted   personal protective equipment utilized   rest/sleep promoted   single patient room provided  Intervention: Prevent VTE (Venous Thromboembolism)  Flowsheets (Taken 12/23/2023 1950)  VTE Prevention/Management:   ambulation promoted   bleeding precations maintained   bleeding risk assessed   bleeding risk factor(s) identified, provider notified   fluids promoted   dorsiflexion/plantar flexion performed  Goal: Optimal Comfort and Wellbeing  Outcome: Ongoing, Progressing  Intervention: Provide Person-Centered Care  Flowsheets (Taken 12/23/2023 1950)  Trust Relationship/Rapport:   choices provided   emotional support provided   empathic listening provided   questions encouraged   questions answered   reassurance provided   thoughts/feelings acknowledged  Intervention: Monitor Pain and Promote  Comfort  Flowsheets (Taken 12/23/2023 1950)  Pain Management Interventions:   medication offered   pain management plan reviewed with patient/caregiver   pillow support provided   position adjusted   quiet environment facilitated   relaxation techniques promoted     Problem: Skin Injury Risk Increased  Goal: Skin Health and Integrity  Outcome: Ongoing, Progressing  Intervention: Optimize Skin Protection  Flowsheets (Taken 12/23/2023 1950)  Pressure Reduction Techniques:   frequent weight shift encouraged   pressure points protected   positioned off wounds   weight shift assistance provided  Skin Protection:   adhesive use limited   incontinence pads utilized   transparent dressing maintained   tubing/devices free from skin contact  Intervention: Promote and Optimize Oral Intake  Flowsheets (Taken 12/23/2023 1950)  Oral Nutrition Promotion:   physical activity promoted   rest periods promoted     Problem: Fall Injury Risk  Goal: Absence of Fall and Fall-Related Injury  Outcome: Ongoing, Progressing  Intervention: Identify and Manage Contributors  Flowsheets (Taken 12/23/2023 1950)  Self-Care Promotion:   independence encouraged   BADL personal objects within reach   BADL personal routines maintained   safe use of adaptive equipment encouraged  Medication Review/Management:   medications reviewed   high-risk medications identified  Intervention: Promote Injury-Free Environment  Flowsheets (Taken 12/23/2023 1950)  Safety Promotion/Fall Prevention:   assistive device/personal item within reach   bed alarm set   diversional activities provided   Fall Risk reviewed with patient/family   Fall Risk signage in place   high risk medications identified   medications reviewed   lighting adjusted   nonskid shoes/socks when out of bed   side rails raised x 2   instructed to call staff for mobility   gait belt with ambulation

## 2023-12-24 NOTE — PT/OT/SLP PROGRESS
Physical Therapy Inpatient Rehab Treatment    Patient Name:  Chichi Mckee   MRN:  [id number]    Recommendations:     Discharge Recommendations:  Low Intensity Therapy   Discharge Equipment Recommendations:     Barriers to discharge: None    Assessment:     Chichi Mckee is a 89 y.o. male admitted with a medical diagnosis of Stroke.  He presents with the following impairments/functional limitations:  weakness, impaired endurance, impaired sensation, impaired self care skills, impaired functional mobility, gait instability, impaired balance, visual deficits, decreased upper extremity function, impaired cognition, decreased coordination, decreased lower extremity function, decreased safety awareness Pt demos impaired hearing and decreased safety awareness. Also pt demos, R sided neglect and inattention.    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 Pt. Has a past medical history of HTN, HLD, PAD, aortic stenosis, bladder mass, BPH, and very [place]    General Precautions: Standard, hearing impaired     Orthopedic Precautions:N/A     Braces: N/A    Rehab Prognosis: Good; patient would benefit from acute skilled PT services to address these deficits and reach maximum level of function.      History:     Past Medical History:   Diagnosis Date    Hypertension        No past surgical history on file.    Subjective     Patient comments: N/A    Respiratory Status: Room air    Patients cultural, spiritual, Taoism conflicts given the current situation:      Objective:     Communicated with RN prior to session.  Patient found up in chair with peripheral IV  upon PT entry to room.    Pt is Oriented x3 and Alert, Cooperative, and Motivated.    Vitals   Vitals at Rest  BP    HR    O2 Sat    Pain      Vitals With Activity  BP (!) 144/70   HR 64   O2 Sat     Pain Pain Rating 1: 0/10       Functional Mobility:     Seated TherX 15 x3 RLE1# 2#    Hip flex/abd/add     Knee ext/felx    Ankle pumps    VC for proper tech and increased time     Puzzle working on R sided neglect, RUE weakness, and inattention.      Current   Status  Discharge   Goal   Functional Area: Care Score:    Roll Left and Right 4 Independent   Sit to Lying 4 Independent   Lying to Sitting on Side of Bed 4 Independent   Sit to Stand 4 Set-up/clean-up   Chair/Bed-to-Chair Transfer 4  Mod VC for complete turn for safety. Daughter present and educated  Set-up/clean-up   Car Transfer 3  RLE assist  Set-up/clean-up   Walk 10 Feet 4 Set-up/clean-up   Walk 50 Feet with Two Turns 4  ~120' overall CGA with R sided foot drop noted.  Set-up/clean-up   Walk 150 Feet 88 Set-up/clean-up   Walk 10 Feet Uneven Surface   Supervision or touching assistance   1 Step (Curb)   Supervision or touching assistance   4 Steps   Not applicable   12 Steps   Not applicable   Picking Up Object   Independent   Wheel 50 Feet with Two Turns   Not applicable   Wheel 150 Feet   Not applicable       Therapeutic Activities and Exercises:  Patient educated on role of acute care PT and PT POC, safety while in hospital including calling nurse for mobility, and call light usage  Patient educated about importance of OOB mobility and remaining up in chair most of the day.    Activity Tolerance: Good    Patient left with seatbelt in wheelchair with all lines intact, call button in reach, and chair alarm on.    Education provided: roles and goals of PT/PTA, transfer training, bed mob, gait training, balance training, safety awareness, body mechanics, wheelchair management, and strengthening exercises    Expected compliance: High compliance    Plan:     During this hospitalization, patient to be seen 5 x/week (5-7 x week) to address the identified rehab impairments via gait training, therapeutic activities, therapeutic exercises, neuromuscular re-education, wheelchair management/training and progress toward the following goals:    GOALS:    Multidisciplinary Problems       Physical Therapy Goals          Problem: Physical Therapy    Goal Priority Disciplines Outcome Goal Variances Interventions   Physical Therapy Goal     PT, PT/OT Ongoing, Progressing     Description: Bed Mobility:  Roll left and right with setup/clean-up assist.  Sit to supine transfer with setup/clean-up assist.  Supine to sit transfer with setup/clean-up assist.    Transfers:  Sit to stand transfer with setup/clean-up assist using RW.  Bed to chair transfer with setup/clean-up assist Stand Step  using RW.  Car transfer with supervision/touching assist using RW.   an object from the ground in standing position with supervision/touching assist using RW.    Mobility:  Ambulate 150 feet with supervision/touching assist using RW.  Ambulate 15 feet on uneven surfaces/ramps with supervision/touching assist using RW.  Pt ascended/descended a 4 inch curb with supervision/touching assist using RW.  Ascend/descend 4 stairs with supervision/touching assist using bilateral handrails.                        Plan of Care Expires:  12/28/23  PT Next Visit Date: 12/28/23  Plan of Care reviewed with: patient    Additional Information:         Time Tracking:     Therapy Time  PT Received On: 12/24/23  PT Start Time: 1000  PT Stop Time: 1130  PT Total Time (min): 90 min   PT Individual: 90  Missed Time:    Time Missed due to:      Billable Minutes: Therapeutic Activity 30, Therapeutic Exercise 30, and Neuromuscular Re-education 30    12/24/2023

## 2023-12-24 NOTE — PT/OT/SLP PROGRESS
"Occupational Therapy Inpatient Rehab Treatment    Name: Chichi Mckee  MRN: [id number]    Assessment:  Chichi Mckee is a 89 y.o. male admitted with a medical diagnosis of Stroke.  He presents with the following impairments/functional limitations:  weakness, impaired endurance, impaired sensation, impaired self care skills, impaired functional mobility, gait instability, impaired balance, visual deficits, decreased upper extremity function, impaired cognition, decreased coordination, decreased lower extremity function, decreased safety awareness.    General Precautions: Standard, fall     Orthopedic Precautions:N/A     Braces: N/A    Rehab Prognosis: Good; patient would benefit from acute skilled OT services to address these deficits and reach maximum level of function.      History:     Past Medical History:   Diagnosis Date    Hypertension        No past surgical history on file.    Subjective     Orientation: Oriented x4    Chief Complaint: "I don't have any pain."    Patient/Family Comments/goals: Pt wants to return home with his family.     Vitals   Vitals:     12/24/23 0730 12/24/2023   BP: (!) 156/73 149/76   Pulse: 66 66      Respiratory Status: Room air    Patients cultural, spiritual, Denominational conflicts given the current situation: no     Objective:     Patient found up in chair with peripheral IV  upon OT entry to room.    Mobility   Patient completed:  Sit to Stand Transfer with minimum assistance with rolling walker  Stand to Sit Transfer with contact guard assistance with rolling walker  Chair to Mat Step Transfer technique with contact guard assistance with rolling walker    ADLs   Current Status   Eating     Oral Hygiene     Shower, Bathe Self     Upper Body Dressing     Lower Body Dressing 2 Pt able to initiate pulling down pants with one hand at a time while standing with RW w/CGA. Pt required A to pull pants down past thigh to doff, thread BLE to joanna, and pull up pants.    Toileting Hygiene 2 " Pt had incontinent episode (urine only). Pt able to able to initiate doffing pants but required A to joanna new pants   Toilet Transfer     Putting On, Taking Off Footwear 1      Limiting Factors for ADLs: motor, endurance, balance, weakness, coordination, and safety awareness     Therapeutic Activities  Pt completed bimanual task while seated in w/c, in which pt constructed two figures with pipes by using a picture of the finished product for reference, for improved visual perception, RUE function, and problem-solving. Pt had difficulty selecting correct pipe amongst others and required max VCs and visual cues for success which indicated pt has difficulty with problem-solving or potentially has decreased figure ground abilities. Pt. Was successful using RUE as dominant hand during activity, with LUE providing stability and manipulating objects in preparation for RUE to use.    Pt attempted word search activity for improved visual perception and RUE dexterity. Pt was able to maintain tripod grasp on high lighter with built-up handle. Pt was unable to coordinate wrist and digit to functionally use high lighter and required [place] A. Pt was able to find 0/3 words with Max Vcs and visual downgrades to task. Pt reported having a good time throughout the activity and did not seem to understand the objective of the activity indicating potentially decreased cognition. Activity was ceased.     Pt attempted to complete activity, in which pt had to manipulate blocks to display the same pattern as that pattern on a card. Pt required Vcs to manipulate block to change Ax was ceased after downgrades for ax did not improve pt's success with ax. .     Pt completed  On Pattern while standing with RW and CGA for improved standing endurance, bimanual task skills, and FMC. Pt used RUE to grasp clothes pins and LUE to manipulate clothespin for RUE. Pt demo ability to open and close clothespin with RUE. Pt required max Vcs to use RUE and  to discriminate orange from red. Pt demo ability to stand throughout entire activity, ~15-minutes. Pt did not have any LOB, dizzines, or SOB.     Therapeutic Exercise  At the EOM while seated unsupported, pt completed 3x10 bicep curls with 2#s and scapula retractions with yellow theraband. Therex was ceased d/t pt having incontinent episode. Pt was brought back to room and cleaned up.     Patient left up in chair with all lines intact, call button in reach, and CNA present.     Education provided: Roles and goals of OT, ADLs, transfer training, body mechanics, modified goals, fall prevention, and home safety    Multidisciplinary Problems       Occupational Therapy Goals          Problem: Occupational Therapy    Goal Priority Disciplines Outcome Interventions   Occupational Therapy Goal     OT, PT/OT Ongoing, Progressing    Description: ADLs:  Pt to perform oral care tasks with set up while standing at sink  Pt to perform UB dressing with SPV.  Pt to perform LB dressing with Partial mod assist with AE as needed.   Pt to perform putting on/off footwear task with Partial mod with AE as needed.  Pt to perform toileting with SPV.  Pt to perform bathing with Partial mod assist with AE as needed.    Functional Transfers:  Pt to perform toilet transfers with incidental touching.  Pt to perform a tub transfer with incidental touching.    Balance, Strengthening, Endurance, Balance:  Pt to consistently demonstrate adherence to orthopedic precautions during all ADL's as instructed by OT.  Pt to demonstrate good dynamic standing balance as required to perform ADL's from standing level.  Pt to demonstrate good BUE strength during functional task   Pt to demonstrate consistent adherence to breathing control and energy conservation techniques as educated by OT.                        Time Tracking     OT Received On: 12/24/23  Time In 0730     Time Out 0910  Total Time 100 min  Therapy Time: OT Individual: 100  Missed Time:     Missed Time Reason:      Billable Minutes: Self Care/Home Management 15, Therapeutic Activity 60, and Therapeutic Exercise 30    12/24/2023

## 2023-12-24 NOTE — PROGRESS NOTES
[facility] ([facility])  Rehab Progress Note    Patient Name: Chichi Mckee  MRN: [id number]  Age: 89 y.o. 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  PMH significant for  HTN, HLD, PAD, aortic stenosis, history of bladder mass, and BPH.  Workup significant for CVA.  Code fast initiated.  CT head negative.  T and K initiated at 12:48 p.m..  Admitted to ICU.  CTA head and neck significant for left vertebral artery occluded proximally.  LVEF  55-60% with negative bubble study.  Repeat CT head negative for intracranial hemorrhage.  MRI brain with contrast significant for patchy acute ischemic changes in the left posterior frontal and temporal lobes.  Diagnostic angiogram planned with Interventional Neurology on , but family declined due to advanced age.  Neurology recommended daily aspirin 81 mg daily and Lipitor 20 mg daily.  Tolerated transfer to [facility]  inpatient rehab unit on  without incident.   Today's Information: No acute events overnight.  Sitting in chair comfortably.  Family at bedside.  Vital signs overall at goal with no recent recorded fevers.  Blood pressures improved.  Reports good sleep and appetite.  Last BM .  No labs or imaging today.    Review of patient's allergies indicates:   Allergen Reactions    Hydrocodone-acetaminophen         Current Facility-Administered Medications:     acetaminophen tablet 650 mg, 650 mg, Oral, Q4H PRN, Nitin, Nimesh A, FNP    ALPRAZolam tablet 0.25 mg, 0.25 mg, Oral, TID PRN, Nitin, Nimesh A, FNP    amLODIPine tablet 10 mg, 10 mg, Oral, Daily,  "Seema Colon, FNP, 10 mg at 12/24/23 0715    aspirin EC tablet 81 mg, 81 mg, Oral, Daily, Nitin, Nimesh A, FNP, 81 mg at 12/24/23 0714    atorvastatin tablet 20 mg, 20 mg, Oral, Daily, Nitin, Nimesh A, FNP, 20 mg at 12/24/23 0715    benzonatate capsule 100 mg, 100 mg, Oral, TID PRN, Nitin, Nimesh A, FNP    bisacodyL suppository 10 mg, 10 mg, Rectal, Daily PRN, Nitin, Nimesh A, FNP    docusate sodium capsule 100 mg, 100 mg, Oral, BID, Nitin, Nimesh A, FNP, 100 mg at 12/24/23 0714    finasteride tablet 5 mg, 5 mg, Oral, Daily, Nitin, Nimesh A, FNP, 5 mg at 12/24/23 0715    hydrALAZINE injection 10 mg, 10 mg, Intravenous, Q4H PRN, Nitin, Nimesh A, FNP    hydrOXYzine pamoate capsule 50 mg, 50 mg, Oral, Nightly PRN, Nitin, Nimesh A, FNP    labetalol 20 mg/4 mL (5 mg/mL) IV syring, 10 mg, Intravenous, Q4H PRN, Nitin, Nimesh A, FNP    metoprolol injection 10 mg, 10 mg, Intravenous, Q2H PRN, Nitin, Nimesh A, FNP    nitroGLYCERIN SL tablet 0.4 mg, 0.4 mg, Sublingual, Q5 Min PRN, Nitin, Nimesh A, FNP    ondansetron disintegrating tablet 4 mg, 4 mg, Oral, Q6H PRN, Nitin, Nimesh A, FNP    ondansetron disintegrating tablet 8 mg, 8 mg, Oral, Q6H PRN, Nitin, Nimesh A, FNP    polyethylene glycol packet 17 g, 17 g, Oral, BID PRN, Nitin, Nimesh A, FNP, 17 g at 12/22/23 1654    promethazine tablet 25 mg, 25 mg, Oral, Q6H PRN, Nitin, Nimesh A, FNP    tamsulosin 24 hr capsule 0.4 mg, 0.4 mg, Oral, QHS, Nimesh Taveras FNP, 0.4 mg at 12/23/23 2009    traMADoL tablet 50 mg, 50 mg, Oral, Q8H PRN, Nimesh Taveras FNP     Review of Systems   Complete 12-point review of symptoms negative except for what's mentioned in HPI     Objective:     BP (!) 149/71   Pulse 65   Temp 98.4 °F (36.9 °C) (Oral)   Resp 18   Ht 5' 10" (1.778 m)   Wt 79.5 kg (175 lb 4.3 oz)   SpO2 97%   BMI 25.15 kg/m²        Intake/Output Summary (Last 24 hours) at 12/24/2023 " 0924  Last data filed at 12/24/2023 0542  Gross per 24 hour   Intake 900 ml   Output --   Net 900 ml       Physical Exam  Vitals reviewed.   HENT:      Head:      Comments: [place]  Eyes:      Pupils: Pupils are equal, round, and reactive to light.   Cardiovascular:      Rate and Rhythm: Normal rate and regular rhythm.      Comments: Heart sounds: Murmur heard.   Systolic murmur is present with a grade of 3/6.   Pulmonary:      Effort: Pulmonary effort is normal.      Breath sounds: Normal breath sounds.   Abdominal:      General: Bowel sounds are normal.   Musculoskeletal:         General: Normal range of motion.      Comments: Right-sided weakness   Skin:     General: Skin is warm.   Neurological:      General: No focal deficit present.      Mental Status: He is alert and oriented to person, place, and time.      Motor: Weakness present.   Psychiatric:         Mood and Affect: Mood normal.         Lines/Drains/Airways       None                   Labs  No results found for this or any previous visit (from the past 24 hour(s)).    Radiology  MRI brain without contrast on 12/18/2023, IMPRESSION: Patchy acute ischemic changes in the left posterior frontal and temporal lobes. Moderate chronic microvascular ischemic changes.  Assessment/Plan:     89 y.o. AAM admitted on 12/21/2023     Ischemic CVA   - to left posterior frontal and temporal lobes with right sided weakness  - continue                Aspirin 81 mg daily                Lipitor 20 mg daily   - Consult physiatry for rehab and pain management  - PT/OT/RT/ST to eval and treat     PAD  - stable   - continue                Aspirin 81 mg daily                Lipitor 20 mg daily      HLD  -  FLP outpatient with PCP  - continue                Lipitor 20 mg daily       VHD  -  aortic stenosis  -  to follow-up with cardiology outpatient     HTN  - BP improved  - continue                Norvasc 10 mg daily (initiated 12/23)                Hydralazine 10 mg every 2 hours  as needed for BP > 160/90                Labetalol 10 mg every 2 hours as needed for BP > 160/90  - low sodium diet     Constipation  - stable   - continue  Colace 100 mg BID   Miralax 17 gm BID PRN     BPH  - stable  - continue                Finasteride 5 mg daily                 Flomax 0.4 mg at bedtime     Hypokalemia  -  potassium 3.2  - s/p Potassium 40 mEq x2 doses      VTE Prophylaxis:  SCDs  COVID-19 testing:  unknown  COVID-19 vaccination status:  vaccinated     POA: No  Living will: No  Contacts: Awa Mckee (dtr) [phone]       CODE STATUS: Full Code  Internal Medicine (attending): Alvarez Zayas MD  Physiatry (consulting):  Wilder Pham MD     OUTPATIENT PROVIDERS    [facility]P:  [facility]    Neurology:  Tyron Rahman MD     DISPOSITION:  Vital signs at goal.  No labs today.  Blood pressures improved.  Bowel management, sleep hygiene, and appetite at goal.  Updated family at bedside.  Answered all questions.  Agrees with current medical plan of care.  Continues to progress well with therapies.  Monitor closely.  Notify of any acute changes.     Total time spent on this encounter including chart review and direct 1-on-1 patient interaction: 51 minutes   Over 50% of this time was spent in counseling and coordination of care

## 2023-12-25 LAB
ALBUMIN SERPL-MCNC: 2.8 G/DL (ref 3.4–4.8)
ALBUMIN/GLOB SERPL: 0.8 RATIO (ref 1.1–2)
ALP SERPL-CCNC: 76 UNIT/L (ref 40–150)
ALT SERPL-CCNC: 23 UNIT/L (ref 0–55)
AST SERPL-CCNC: 18 UNIT/L (ref 5–34)
BASOPHILS # BLD AUTO: 0.05 X10(3)/MCL
BASOPHILS NFR BLD AUTO: 0.9 %
BILIRUB SERPL-MCNC: 0.5 MG/DL
BUN SERPL-MCNC: 17.9 MG/DL (ref 8.4–25.7)
CALCIUM SERPL-MCNC: 9.7 MG/DL (ref 8.8–10)
CHLORIDE SERPL-SCNC: 109 MMOL/L (ref 98–111)
CO2 SERPL-SCNC: 22 MMOL/L (ref 23–31)
CREAT SERPL-MCNC: 1.14 MG/DL (ref 0.73–1.18)
EOSINOPHIL # BLD AUTO: 0.58 X10(3)/MCL (ref 0–0.9)
EOSINOPHIL NFR BLD AUTO: 10.3 %
ERYTHROCYTE [DISTWIDTH] IN BLOOD BY AUTOMATED COUNT: 13.6 % (ref 11.5–17)
GFR SERPLBLD CREATININE-BSD FMLA CKD-EPI: 60 MLS/MIN/1.73/M2
GLOBULIN SER-MCNC: 3.6 GM/DL (ref 2.4–3.5)
GLUCOSE SERPL-MCNC: 93 MG/DL (ref 75–121)
HCT VFR BLD AUTO: 42.1 % (ref 42–52)
HGB BLD-MCNC: 13.5 G/DL (ref 14–18)
IMM GRANULOCYTES # BLD AUTO: 0.02 X10(3)/MCL (ref 0–0.04)
IMM GRANULOCYTES NFR BLD AUTO: 0.4 %
LYMPHOCYTES # BLD AUTO: 0.92 X10(3)/MCL (ref 0.6–4.6)
LYMPHOCYTES NFR BLD AUTO: 16.4 %
MAGNESIUM SERPL-MCNC: 1.9 MG/DL (ref 1.6–2.6)
MCH RBC QN AUTO: 28.4 PG (ref 27–31)
MCHC RBC AUTO-ENTMCNC: 32.1 G/DL (ref 33–36)
MCV RBC AUTO: 88.4 FL (ref 80–94)
MONOCYTES # BLD AUTO: 0.7 X10(3)/MCL (ref 0.1–1.3)
MONOCYTES NFR BLD AUTO: 12.5 %
NEUTROPHILS # BLD AUTO: 3.35 X10(3)/MCL (ref 2.1–9.2)
NEUTROPHILS NFR BLD AUTO: 59.5 %
NRBC BLD AUTO-RTO: 0 %
PHOSPHATE SERPL-MCNC: 2.8 MG/DL (ref 2.3–4.7)
PLATELET # BLD AUTO: 254 X10(3)/MCL (ref 130–400)
PMV BLD AUTO: 11.1 FL (ref 7.4–10.4)
POTASSIUM SERPL-SCNC: 4.1 MMOL/L (ref 3.5–5.1)
PREALB SERPL-MCNC: 10.7 MG/DL (ref 16–42)
PROT SERPL-MCNC: 6.4 GM/DL (ref 5.8–7.6)
RBC # BLD AUTO: 4.76 X10(6)/MCL (ref 4.7–6.1)
SODIUM SERPL-SCNC: 137 MMOL/L (ref 132–146)
WBC # SPEC AUTO: 5.62 X10(3)/MCL (ref 4.5–11.5)

## 2023-12-25 PROCEDURE — 84134 ASSAY OF PREALBUMIN: CPT | Performed by: NURSE PRACTITIONER

## 2023-12-25 PROCEDURE — 84100 ASSAY OF PHOSPHORUS: CPT | Performed by: NURSE PRACTITIONER

## 2023-12-25 PROCEDURE — 25000003 PHARM REV CODE 250: Performed by: NURSE PRACTITIONER

## 2023-12-25 PROCEDURE — 11800000 HC REHAB PRIVATE ROOM

## 2023-12-25 PROCEDURE — 83735 ASSAY OF MAGNESIUM: CPT | Performed by: NURSE PRACTITIONER

## 2023-12-25 PROCEDURE — 80053 COMPREHEN METABOLIC PANEL: CPT | Performed by: NURSE PRACTITIONER

## 2023-12-25 PROCEDURE — 99900035 HC TECH TIME PER 15 MIN (STAT)

## 2023-12-25 PROCEDURE — 85025 COMPLETE CBC W/AUTO DIFF WBC: CPT | Performed by: NURSE PRACTITIONER

## 2023-12-25 RX ADMIN — ATORVASTATIN CALCIUM 20 MG: 10 TABLET, FILM COATED ORAL at 08:12

## 2023-12-25 RX ADMIN — TAMSULOSIN HYDROCHLORIDE 0.4 MG: 0.4 CAPSULE ORAL at 08:12

## 2023-12-25 RX ADMIN — DOCUSATE SODIUM 100 MG: 100 CAPSULE, LIQUID FILLED ORAL at 08:12

## 2023-12-25 RX ADMIN — ASPIRIN 81 MG: 81 TABLET, COATED ORAL at 08:12

## 2023-12-25 RX ADMIN — AMLODIPINE BESYLATE 10 MG: 5 TABLET ORAL at 08:12

## 2023-12-25 RX ADMIN — FINASTERIDE 5 MG: 5 TABLET, FILM COATED ORAL at 08:12

## 2023-12-25 NOTE — PROGRESS NOTES
[facility] ([facility])  Rehab Progress Note    Patient Name: Chichi Mckee  MRN: [id number]  Age: 89 y.o. 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  PMH significant for  HTN, HLD, PAD, aortic stenosis, history of bladder mass, and BPH.  Workup significant for CVA.  Code fast initiated.  CT head negative.  T and K initiated at 12:48 p.m..  Admitted to ICU.  CTA head and neck significant for left vertebral artery occluded proximally.  LVEF  55-60% with negative bubble study.  Repeat CT head negative for intracranial hemorrhage.  MRI brain with contrast significant for patchy acute ischemic changes in the left posterior frontal and temporal lobes.  Diagnostic angiogram planned with Interventional Neurology on , but family declined due to advanced age.  Neurology recommended daily aspirin 81 mg daily and Lipitor 20 mg daily.  Tolerated transfer to [facility]  inpatient rehab unit on  without incident.   Today's Information: No acute events overnight.  Laying in bed comfortably.  Reports good sleep and appetite.  Last BM .  Vital signs at goal with no recent recorded fevers.  Labs reviewed, CBC unremarkable.  Albumin trending down and pre-albumin trending up, metabolic acidosis trending up with improvement, otherwise unremarkable.  No imaging today.    Review of patient's allergies indicates:   Allergen Reactions    Hydrocodone-acetaminophen         Current Facility-Administered Medications:     acetaminophen tablet 650 mg, 650 mg, Oral, Q4H PRN, Nimesh Taveras, HONGP    ALPRAZolam tablet 0.25 mg, 0.25 mg,  "Oral, TID PRN, Nitin, Nimesh A, FNP    amLODIPine tablet 10 mg, 10 mg, Oral, Daily, Seema Colon, FNP, 10 mg at 12/25/23 0804    aspirin EC tablet 81 mg, 81 mg, Oral, Daily, Nitin, Nimesh A, FNP, 81 mg at 12/25/23 0804    atorvastatin tablet 20 mg, 20 mg, Oral, Daily, Nitin, Nimesh A, FNP, 20 mg at 12/25/23 0805    benzonatate capsule 100 mg, 100 mg, Oral, TID PRN, Nitin, Nimesh A, FNP    bisacodyL suppository 10 mg, 10 mg, Rectal, Daily PRN, Nitin, Nimesh A, FNP    docusate sodium capsule 100 mg, 100 mg, Oral, BID, Nitin, Nimesh A, FNP, 100 mg at 12/25/23 0804    finasteride tablet 5 mg, 5 mg, Oral, Daily, Nitin, Nimesh A, FNP, 5 mg at 12/25/23 0805    hydrALAZINE injection 10 mg, 10 mg, Intravenous, Q4H PRN, Nitin, Nimesh A, FNP    hydrOXYzine pamoate capsule 50 mg, 50 mg, Oral, Nightly PRN, Nitin, Nimesh A, FNP    labetalol 20 mg/4 mL (5 mg/mL) IV syring, 10 mg, Intravenous, Q4H PRN, Nitin, Nimesh A, FNP    metoprolol injection 10 mg, 10 mg, Intravenous, Q2H PRN, Nitin, Nimesh A, FNP    nitroGLYCERIN SL tablet 0.4 mg, 0.4 mg, Sublingual, Q5 Min PRN, Nitin, Nimesh A, FNP    ondansetron disintegrating tablet 4 mg, 4 mg, Oral, Q6H PRN, Nitin, Nimesh A, FNP    ondansetron disintegrating tablet 8 mg, 8 mg, Oral, Q6H PRN, Nitin, Nimesh A, FNP    polyethylene glycol packet 17 g, 17 g, Oral, BID PRN, Nitin, Nimesh A, FNP, 17 g at 12/22/23 1654    promethazine tablet 25 mg, 25 mg, Oral, Q6H PRN, Nitin, Nimesh A, FNP    tamsulosin 24 hr capsule 0.4 mg, 0.4 mg, Oral, QHS, Nitin, Nimesh A, FNP, 0.4 mg at 12/24/23 2107    traMADoL tablet 50 mg, 50 mg, Oral, Q8H PRN, Nitin, Nimesh A, FNP     Review of Systems   Complete 12-point review of symptoms negative except for what's mentioned in HPI     Objective:     BP (!) 147/71   Pulse 60   Temp 98.2 °F (36.8 °C) (Oral)   Resp 16   Ht 5' 10" (1.778 m)   Wt 79.5 kg (175 lb 4.3 oz)   " SpO2 95%   BMI 25.15 kg/m²        Physical Exam  Vitals reviewed.   HENT:      Head:      Comments: [place]  Eyes:      Pupils: Pupils are equal, round, and reactive to light.   Cardiovascular:      Rate and Rhythm: Normal rate and regular rhythm.      Comments: Heart sounds: Murmur heard.   Systolic murmur is present with a grade of 3/6.   Pulmonary:      Effort: Pulmonary effort is normal.      Breath sounds: Normal breath sounds.   Abdominal:      General: Bowel sounds are normal.   Musculoskeletal:         General: Normal range of motion.      Comments: Right-sided weakness   Skin:     General: Skin is warm.   Neurological:      General: No focal deficit present.      Mental Status: He is alert and oriented to person, place, and time.      Motor: Weakness present.   Psychiatric:         Mood and Affect: Mood normal.         Lines/Drains/Airways       None                   Labs  Recent Results (from the past 24 hour(s))   Prealbumin    Collection Time: 12/25/23  5:28 AM   Result Value Ref Range    Prealbumin 10.7 (L) 16.0 - 42.0 mg/dL   Comprehensive Metabolic Panel    Collection Time: 12/25/23  5:28 AM   Result Value Ref Range    Sodium Level 137 132 - 146 mmol/L    Potassium Level 4.1 3.5 - 5.1 mmol/L    Chloride 109 98 - 111 mmol/L    Carbon Dioxide 22 (L) 23 - 31 mmol/L    Glucose Level 93 75 - 121 mg/dL    Blood Urea Nitrogen 17.9 8.4 - 25.7 mg/dL    Creatinine 1.14 0.73 - 1.18 mg/dL    Calcium Level Total 9.7 8.8 - 10.0 mg/dL    Protein Total 6.4 5.8 - 7.6 gm/dL    Albumin Level 2.8 (L) 3.4 - 4.8 g/dL    Globulin 3.6 (H) 2.4 - 3.5 gm/dL    Albumin/Globulin Ratio 0.8 (L) 1.1 - 2.0 ratio    Bilirubin Total 0.5 <=1.5 mg/dL    Alkaline Phosphatase 76 40 - 150 unit/L    Alanine Aminotransferase 23 0 - 55 unit/L    Aspartate Aminotransferase 18 5 - 34 unit/L    eGFR 60 mls/min/1.73/m2   Magnesium    Collection Time: 12/25/23  5:28 AM   Result Value Ref Range    Magnesium Level 1.90 1.60 - 2.60 mg/dL   Phosphorus     Collection Time: 12/25/23  5:28 AM   Result Value Ref Range    Phosphorus Level 2.8 2.3 - 4.7 mg/dL   CBC with Differential    Collection Time: 12/25/23  5:28 AM   Result Value Ref Range    WBC 5.62 4.50 - 11.50 x10(3)/mcL    RBC 4.76 4.70 - 6.10 x10(6)/mcL    Hgb 13.5 (L) 14.0 - 18.0 g/dL    Hct 42.1 42.0 - 52.0 %    MCV 88.4 80.0 - 94.0 fL    MCH 28.4 27.0 - 31.0 pg    MCHC 32.1 (L) 33.0 - 36.0 g/dL    RDW 13.6 11.5 - 17.0 %    Platelet 254 130 - 400 x10(3)/mcL    MPV 11.1 (H) 7.4 - 10.4 fL    Neut % 59.5 %    Lymph % 16.4 %    Mono % 12.5 %    Eos % 10.3 %    Basophil % 0.9 %    Lymph # 0.92 0.6 - 4.6 x10(3)/mcL    Neut # 3.35 2.1 - 9.2 x10(3)/mcL    Mono # 0.70 0.1 - 1.3 x10(3)/mcL    Eos # 0.58 0 - 0.9 x10(3)/mcL    Baso # 0.05 <=0.2 x10(3)/mcL    IG# 0.02 0 - 0.04 x10(3)/mcL    IG% 0.4 %    NRBC% 0.0 %       Radiology  MRI brain without contrast on 12/18/2023, IMPRESSION: Patchy acute ischemic changes in the left posterior frontal and temporal lobes. Moderate chronic microvascular ischemic changes.  Assessment/Plan:     89 y.o. AAM admitted on 12/21/2023     Ischemic CVA   - to left posterior frontal and temporal lobes with right sided weakness  - continue                Aspirin 81 mg daily                Lipitor 20 mg daily   - Consult physiatry for rehab and pain management  - PT/OT/RT/ST to eval and treat     PAD  - stable   - continue                Aspirin 81 mg daily                Lipitor 20 mg daily      HLD  -  FLP outpatient with PCP  - continue                Lipitor 20 mg daily       VHD  -  aortic stenosis  -  to follow-up with cardiology outpatient     HTN  - BP improved  - continue                Norvasc 10 mg daily (initiated 12/23)                Hydralazine 10 mg every 2 hours as needed for BP > 160/90                Labetalol 10 mg every 2 hours as needed for BP > 160/90  - low sodium diet     Constipation  - stable   - continue  Colace 100 mg BID   Miralax 17 gm BID PRN     BPH  - stable  -  continue                Finasteride 5 mg daily                 Flomax 0.4 mg at bedtime     Hypokalemia  -  potassium 3.2  - s/p Potassium 40 mEq x2 doses      VTE Prophylaxis:  SCDs  COVID-19 testing:  unknown  COVID-19 vaccination status:  vaccinated     POA: No  Living will: No  Contacts: Awa Mckee (dtr) [phone]       CODE STATUS: Full Code  Internal Medicine (attending): Alvarez Zayas MD  Physiatry (consulting):  Wilder Pham MD     OUTPATIENT PROVIDERS    PCP:  [facility]    Neurology:  Tyron Rahman MD     DISPOSITION:  Vital signs at goal.  Labs reviewed.  CBC unremarkable.  Metabolic acidosis trending up with improvement.  Albumin trending down, pre-albumin trending up.  Bowel management, sleep hygiene, and appetite at goal.  Continues to progress well with therapies.  Monitor closely.  Notify of any acute changes.     Total time spent on this encounter including chart review and direct 1-on-1 patient interaction: 51 minutes   Over 50% of this time was spent in counseling and coordination of care

## 2023-12-26 PROCEDURE — 97110 THERAPEUTIC EXERCISES: CPT | Mod: CQ

## 2023-12-26 PROCEDURE — 97110 THERAPEUTIC EXERCISES: CPT

## 2023-12-26 PROCEDURE — 25000003 PHARM REV CODE 250: Performed by: NURSE PRACTITIONER

## 2023-12-26 PROCEDURE — 97530 THERAPEUTIC ACTIVITIES: CPT | Mod: CQ

## 2023-12-26 PROCEDURE — 63600175 PHARM REV CODE 636 W HCPCS: Performed by: NURSE PRACTITIONER

## 2023-12-26 PROCEDURE — 97535 SELF CARE MNGMENT TRAINING: CPT

## 2023-12-26 PROCEDURE — 97116 GAIT TRAINING THERAPY: CPT

## 2023-12-26 PROCEDURE — 97530 THERAPEUTIC ACTIVITIES: CPT

## 2023-12-26 PROCEDURE — 99900035 HC TECH TIME PER 15 MIN (STAT)

## 2023-12-26 PROCEDURE — 11800000 HC REHAB PRIVATE ROOM

## 2023-12-26 RX ADMIN — LABETALOL HYDROCHLORIDE 10 MG: 5 INJECTION, SOLUTION INTRAVENOUS at 06:12

## 2023-12-26 RX ADMIN — ATORVASTATIN CALCIUM 20 MG: 10 TABLET, FILM COATED ORAL at 07:12

## 2023-12-26 RX ADMIN — AMLODIPINE BESYLATE 10 MG: 5 TABLET ORAL at 05:12

## 2023-12-26 RX ADMIN — DOCUSATE SODIUM 100 MG: 100 CAPSULE, LIQUID FILLED ORAL at 07:12

## 2023-12-26 RX ADMIN — ACETAMINOPHEN 650 MG: 325 TABLET ORAL at 07:12

## 2023-12-26 RX ADMIN — DOCUSATE SODIUM 100 MG: 100 CAPSULE, LIQUID FILLED ORAL at 09:12

## 2023-12-26 RX ADMIN — TAMSULOSIN HYDROCHLORIDE 0.4 MG: 0.4 CAPSULE ORAL at 09:12

## 2023-12-26 RX ADMIN — FINASTERIDE 5 MG: 5 TABLET, FILM COATED ORAL at 07:12

## 2023-12-26 RX ADMIN — ASPIRIN 81 MG: 81 TABLET, COATED ORAL at 07:12

## 2023-12-26 NOTE — PT/OT/SLP PROGRESS
Physical Therapy Inpatient Rehab Treatment    Patient Name:  Chichi Mckee   MRN:  [id number]    Recommendations:     Discharge Recommendations:  Low Intensity Therapy   Discharge Equipment Recommendations:     Barriers to discharge: Impaired functional mobility     Assessment:     Chichi Mckee is a 89 y.o. male admitted with a medical diagnosis of Stroke.  He presents with the following impairments/functional limitations:  weakness, impaired endurance, impaired functional mobility, gait instability, impaired balance, visual deficits, impaired cognition, decreased coordination, decreased upper extremity function, decreased lower extremity function, decreased safety awareness .    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 Pt. Has a past medical history of HTN, HLD, PAD, aortic stenosis, bladder mass, BPH, and very [place]    General Precautions: Standard, hearing impaired     Orthopedic Precautions:N/A     Braces: N/A    Rehab Prognosis: Good; patient would benefit from acute skilled PT services to address these deficits and reach maximum level of function.      History:     Past Medical History:   Diagnosis Date    Hypertension        No past surgical history on file.    Subjective     Patient comments: n/a    Respiratory Status: Room air    Patients cultural, spiritual, Rastafari conflicts given the current situation:      Objective:     Communicated with rn prior to session.  Patient found up in chair with peripheral IV  upon PT entry to room.        Vitals   Vitals at Rest  /70   HR 72   O2 Sat    Pain      Vitals With Activity  BP (!) 154/79   HR 66   O2 Sat     Pain         Functional Mobility:        Current   Status  Discharge   Goal   Functional Area: Care Score:    Roll Left and Right   Independent   Sit to Lying   Independent   Lying to Sitting on Side of Bed   Independent   Sit to Stand 3  W/rw and min assist  Set-up/clean-up    Chair/Bed-to-Chair Transfer 3  W/rw and min assist for rw placement Set-up/clean-up   Car Transfer   Set-up/clean-up   Walk 10 Feet  Set-up/clean-up   Walk 50 Feet with Two Turns  Set-up/clean-up   Walk 150 Feet  Set-up/clean-up   Walk 10 Feet Uneven Surface   Supervision or touching assistance   1 Step (Curb)   Supervision or touching assistance   4 Steps   Not applicable   12 Steps   Not applicable   Picking Up Object   Independent   Wheel 50 Feet with Two Turns   Not applicable   Wheel 150 Feet   Not applicable       Therapeutic Activities and Exercises:  Patient performed 2 set(s) of 10 repetitions of the following seated exercises: long arc quads, marches, hip adduction, and knee flexion for bilateral LE. Patient required skilled PT for instruction of exercises and appropriate cues to perform exercises safely and appropriately.  Pt preformed toilet tf w/rw and cga fro safety.    Activity Tolerance: Good    Patient left up in chair with all lines intact, call button in reach, and chair alarm on.    Education provided: transfer training and strengthening exercises    Expected compliance: High compliance    Plan:     During this hospitalization, patient to be seen 5 x/week (5-7 x week) to address the identified rehab impairments via gait training, therapeutic activities, therapeutic exercises, neuromuscular re-education, wheelchair management/training and progress toward the following goals:    GOALS:   Multidisciplinary Problems       Physical Therapy Goals          Problem: Physical Therapy    Goal Priority Disciplines Outcome Goal Variances Interventions   Physical Therapy Goal     PT, PT/OT Ongoing, Progressing     Description: Bed Mobility:  Roll left and right with setup/clean-up assist.  Sit to supine transfer with setup/clean-up assist.  Supine to sit transfer with setup/clean-up assist.    Transfers:  Sit to stand transfer with setup/clean-up assist using RW.  Bed to chair transfer with setup/clean-up  assist Stand Step  using RW.  Car transfer with supervision/touching assist using RW.   an object from the ground in standing position with supervision/touching assist using RW.    Mobility:  Ambulate 150 feet with supervision/touching assist using RW.  Ambulate 15 feet on uneven surfaces/ramps with supervision/touching assist using RW.  Pt ascended/descended a 4 inch curb with supervision/touching assist using RW.  Ascend/descend 4 stairs with supervision/touching assist using bilateral handrails.                        Plan of Care Expires:  12/28/23  PT Next Visit Date: 12/28/23  Plan of Care reviewed with: patient    Additional Information:         Time Tracking:     Therapy Time  PT Received On: 12/26/23  PT Start Time: 1300  PT Stop Time: 1330  PT Total Time (min): 30 min   PT Individual: 30  Missed Time:    Time Missed due to:      Billable Minutes: Therapeutic Activity 15 and Therapeutic Exercise 15    12/26/2023

## 2023-12-26 NOTE — PT/OT/SLP PROGRESS
Physical Therapy Inpatient Rehab Treatment    Patient Name:  Chichi Mckee   MRN:  [id number]    Recommendations:     Discharge Recommendations:  Low Intensity Therapy   Discharge Equipment Recommendations:     Barriers to discharge: Impaired functional mobility     Assessment:     Chichi Mckee is a 89 y.o. male admitted with a medical diagnosis of Stroke.  He presents with the following impairments/functional limitations:  weakness, impaired endurance, impaired sensation, impaired self care skills, impaired functional mobility, gait instability, impaired balance, visual deficits, decreased upper extremity function, impaired cognition, decreased coordination, decreased lower extremity function, decreased safety awareness .    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 Pt. Has a past medical history of HTN, HLD, PAD, aortic stenosis, bladder mass, BPH, and very [place]    General Precautions: Standard, hearing impaired     Orthopedic Precautions:N/A     Braces: N/A    Rehab Prognosis: Good; patient would benefit from acute skilled PT services to address these deficits and reach maximum level of function.      History:     Past Medical History:   Diagnosis Date    Hypertension        No past surgical history on file.    Subjective     Patient comments: n/a    Respiratory Status: Room air    Patients cultural, spiritual, Presybeterian conflicts given the current situation:      Objective:     .  Patient found up in chair with peripheral IV  upon PT entry to room.      Vitals   Vitals at Rest  /74   HR 71   O2 Sat    Pain      Vitals With Activity  /71   HR 71   O2 Sat     Pain         Functional Mobility:        Current   Status  Discharge   Goal   Functional Area: Care Score:    Roll Left and Right   Independent   Sit to Lying   Independent   Lying to Sitting on Side of Bed   Independent   Sit to Stand 3  W/rw and min assist to stand. Pt  required vc for proper hand placement. Set-up/clean-up   Chair/Bed-to-Chair Transfer 3  W/rw and min assist for rw placement  Set-up/clean-up   Car Transfer   Set-up/clean-up   Walk 10 Feet 4 Set-up/clean-up   Walk 50 Feet with Two Turns 4 Set-up/clean-up   Walk 150 Feet 4  Pt amb 150ft w/rw and cga for safety. Pt amb with slow gait speed and scissoring gait and decrease toe clearance on R foot. Pt required vc to keep rw close and to spread feet apart while amb.   Set-up/clean-up   Walk 10 Feet Uneven Surface   Supervision or touching assistance   1 Step (Curb)   Supervision or touching assistance   4 Steps   Not applicable   12 Steps   Not applicable   Picking Up Object   Independent   Wheel 50 Feet with Two Turns   Not applicable   Wheel 150 Feet   Not applicable       Therapeutic Activities and Exercises:  Pt preformed sit<> stand 3x w/rw while therapist assisted pt in changing soiled brief and pants. Pt assisted therapist in pulling up brief and pants while preforming dynamic standing balance.     Activity Tolerance: Good    Patient left up in chair with all lines intact, call button in reach, and chair alarm on.    Education provided: gait training and balance training    Expected compliance: High compliance    Plan:     During this hospitalization, patient to be seen 5 x/week (5-7 x week) to address the identified rehab impairments via gait training, therapeutic activities, therapeutic exercises, neuromuscular re-education, wheelchair management/training and progress toward the following goals:    GOALS:   Multidisciplinary Problems       Physical Therapy Goals          Problem: Physical Therapy    Goal Priority Disciplines Outcome Goal Variances Interventions   Physical Therapy Goal     PT, PT/OT Ongoing, Progressing     Description: Bed Mobility:  Roll left and right with setup/clean-up assist.  Sit to supine transfer with setup/clean-up assist.  Supine to sit transfer with setup/clean-up  assist.    Transfers:  Sit to stand transfer with setup/clean-up assist using RW.  Bed to chair transfer with setup/clean-up assist Stand Step  using RW.  Car transfer with supervision/touching assist using RW.   an object from the ground in standing position with supervision/touching assist using RW.    Mobility:  Ambulate 150 feet with supervision/touching assist using RW.  Ambulate 15 feet on uneven surfaces/ramps with supervision/touching assist using RW.  Pt ascended/descended a 4 inch curb with supervision/touching assist using RW.  Ascend/descend 4 stairs with supervision/touching assist using bilateral handrails.                        Plan of Care Expires:  12/28/23  PT Next Visit Date: 12/28/23  Plan of Care reviewed with: patient    Additional Information:         Time Tracking:     Therapy Time  PT Received On: 12/26/23  PT Start Time: 1100  PT Stop Time: 1200  PT Total Time (min): 60 min   PT Individual: 60  Missed Time:    Time Missed due to:      Billable Minutes: Gait Training 30 and Therapeutic Activity 30    12/26/2023

## 2023-12-26 NOTE — PLAN OF CARE
"Met with pt then called Sander (son) with an update from team conference and discharge planned for Fri, 1/5.  Discussed the need for family training and for the fly to take pics of the home environment.  He will discuss with the 2 daughters who "live on the property" and have one of them call me to schedule and discuss pics further.      NOTE:  Pt's correct YOB: 1928, not 1929, per patient and dtr (via PT Alyse).    Later:  Dtr Verito called back and scheduled initial training for Mon, 1/1, at 1300 (she will be out of town until this Friday) with herself, her sister, and pt's wife.  They will do immersive training thereafter.  Discussed pictures of the home environment, but she asked that I have Sander email those to me since he goes to the house each evening.  Called Sander back and obtained his email address and emailed him so he can respond with the images once taken.      Verito also indicated that she thinks pt already has a RW and BSC at the home in the garage but will double check.  "

## 2023-12-26 NOTE — PT/OT/SLP PROGRESS
Occupational Therapy Inpatient Rehab Treatment    Name: Chichi Mckee  MRN: [id number]    Assessment:  Chichi Mckee is a 89 y.o. male admitted with a medical diagnosis of Stroke.  He presents with the following impairments/functional limitations:  impaired endurance, weakness, impaired self care skills, impaired functional mobility, gait instability, impaired balance, impaired cognition, visual deficits, decreased lower extremity function, decreased upper extremity function, decreased safety awareness, impaired coordination.    General Precautions: Standard, vision impaired     Orthopedic Precautions:N/A     Braces: N/A    Rehab Prognosis: Good; patient would benefit from acute skilled OT services to address these deficits and reach maximum level of function.      History:     Past Medical History:   Diagnosis Date    Hypertension        No past surgical history on file.    Subjective     Orientation: Oriented x4    Chief Complaint: No complaints     Vitals   Vitals:    12/26/23 1400 12/26/23 1430   BP: 125/72 120/68   Pulse: 78 70     Respiratory Status: Room air    Patients cultural, spiritual, Yazdanism conflicts given the current situation: no     Objective:     Patient found up in chair with peripheral IV, telemetry  upon OT entry to room.    Mobility   Patient completed:  Sit to Stand Transfer with Mod A from w/c and CGA from BSC with rolling walker  Stand to Sit Transfer with minimum assistance with rolling walker  Toilet Transfer Step Transfer technique with minimum assistance with  rolling walker    Functional Mobility  Pt performed functional mobility for functional step-transfers within the room for BADL completion w/RW and min A.    ADLs   Current Status   Eating     Oral Hygiene     Shower, Bathe Self     Upper Body Dressing     Lower Body Dressing     Toileting Hygiene 3 Pt unable to void/BM; pt demo ability to wipe and manage clothes with CGA and A for hand placement on RW    Toilet Transfer 3 Pt  requried min A for step-transfer to [facility] over toilet from w/c   Putting On, Taking Off Footwear       Limiting Factors for ADLs: motor, sensory, endurance, limited ROM, balance, visual, perceptual, cognition, safety awareness, and pain     Therapeutic Exercise  Pt engaged in 2x10 across body punches and shoulder press with 2# weight in LUE and 1# weight in RUE while seated in recliner without back support for improved UE function, sitting balance, and core control.     Patient left up in chair with all lines intact, call button in reach, and chair alarm on.     Education provided: Roles and goals of OT, ADLs, transfer training, body mechanics, modified goals, sequencing, safety precautions, and fall prevention    Multidisciplinary Problems       Occupational Therapy Goals          Problem: Occupational Therapy    Goal Priority Disciplines Outcome Interventions   Occupational Therapy Goal     OT, PT/OT Ongoing, Progressing    Description: ADLs:  Pt to perform oral care tasks with set up while standing at sink  Pt to perform UB dressing with SPV.  Pt to perform LB dressing with Partial mod assist with AE as needed.   Pt to perform putting on/off footwear task with Partial mod with AE as needed.  Pt to perform toileting with SPV.  Pt to perform bathing with Partial mod assist with AE as needed.    Functional Transfers:  Pt to perform toilet transfers with incidental touching.  Pt to perform a tub transfer with incidental touching.    Balance, Strengthening, Endurance, Balance:  Pt to consistently demonstrate adherence to orthopedic precautions during all ADL's as instructed by OT.  Pt to demonstrate good dynamic standing balance as required to perform ADL's from standing level.  Pt to demonstrate good BUE strength during functional task   Pt to demonstrate consistent adherence to breathing control and energy conservation techniques as educated by OT.                        Time Tracking     OT Received On:  12/26/23  Time In 1400     Time Out 1430  Total Time 30 min  Therapy Time: OT Individual: 30  Missed Time:    Missed Time Reason:      Billable Minutes: Self Care/Home Management 20 and Therapeutic Exercise 10    12/26/2023

## 2023-12-26 NOTE — PROGRESS NOTES
12/26/23 0901   Rec Therapy Time Calculation   Date of Treatment 12/26/23   Rec Start Time 0901   Rec Stop Time 0930   Rec Total Time (min) 29 min   Time   Treatment time 2 units   Charges   $Therapeutic Activity 1unit   Precautions   General Precautions fall;vision impaired;hearing impaired   Orthopedic Precautions  N/A   Pain/Comfort   Pain Rating 1 no pain   Vital Signs   Pulse 68   /67   OTHER   Rehab identified problem list/impairments weakness;impaired endurance;impaired functional mobility;gait instability;impaired balance;visual deficits;impaired cognition;decreased coordination;decreased upper extremity function;decreased lower extremity function;decreased safety awareness   Values/Beliefs/Spiritual Care   Spiritual, Cultural Beliefs, Advent Practices, Values that Affect Care no   Overall Level of Functioning   Activity Tolerance Independent   Dynamic Sitting Balance/Reaching Mod Indep   Dynamic Standing Balance/Reaching Min A   Right UE Coodination/Dexterity Mod A   Left UE Coordination/Dexterity Standby Assist   Problem Solving/Sequencing Skills Min A   Memory Recall Min A   R/L Neglect/Inattention Standby assist   Attention Span Standby Assist   Social Interaction Standby Assist   Recreational Therapy Short Term Goals   Short Term Goal 1 Progression Progressing   Short Term Goal 2 Progression Progressing   Short Term Goal 3 Progression Progressing   Recreational Therapy Long Term Goals   Long Term Goal 1 Progression Met   Long Term Goal 2 Progression Progressing   Long Term Goal 3 Progression Progressing   Plan   Patient to be seen Daily   Planned Duration 3 weeks   Treatments Planned Balance training;Cognitive training;Coordination;Energy conservation training;Fine motor;Safety education   Treatment plan/goals estblished with Patient/Caregiver Yes

## 2023-12-26 NOTE — PT/OT/SLP PROGRESS
Recreational Therapy Treatment    Date of Treatment: 12/26/23  Start Time: 0901  Stop Time: 0930  Total Time: 29 min  Missed Time:     General Precautions: fall, vision impaired, hearing impaired  Ortho Precautions: N/A  Braces: N/A    Vitals   Vitals at Rest  /67   HR 68   O2 Sat    Pain      Vitals With Activity  /73   HR 75   O2 Sat    Pain        Treatment     Cognitive Skills Building   Cognitive Observation Activity Assist Position Equipment Response            Comment:          Dynamic Activities   Activity Assist Position Equipment Response   Activity 1 Washer toss supervision and contact guard assistance Standing Rolling walker and Metal washers good   Comment: Sit to stand was contact guard/supervision as was dynamic standing balance/reaching. Standing tolerance was 15 minutes.  RUE coordination was mod.  Hand over hand assist needed for motor planning.  Memory and problem solving skills were supervision.  Hearing deficits interfere with participation.  Cooperative .  Smiled when successful with Washer Toss       Fine Motor Activities   Activity Assist Position Equipment Response           Comment:          Additional Info: This was a co-treat with PT    Pt progress, plan of care and discharge plans were discussed during staffing at 0930    Goals     Short Term Goals    Goal  Goal Status   Will increase sit to stand to supervision Progressing   Will improved dynamic standing balance/reaching to supervision Progressing   Will increase RUE coordination/dexteriety to min Progressing           Long Term Goals    Goal Goal Status   Will increase standing tolerance to 5 minutes Met   Will improve dynamic standing balance/reaching to setup Progressing   Will increase RUE coordination/dexteriety to supervision Progressing

## 2023-12-26 NOTE — PROGRESS NOTES
[facility] ([facility])  Rehab Progress Note    Patient Name: Chichi Mckee  MRN: [id number]  Age: 89 y.o. 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  PMH significant for  HTN, HLD, PAD, aortic stenosis, history of bladder mass, and BPH.  Workup significant for CVA.  Code fast initiated.  CT head negative.  T and K initiated at 12:48 p.m..  Admitted to ICU.  CTA head and neck significant for left vertebral artery occluded proximally.  LVEF  55-60% with negative bubble study.  Repeat CT head negative for intracranial hemorrhage.  MRI brain with contrast significant for patchy acute ischemic changes in the left posterior frontal and temporal lobes.  Diagnostic angiogram planned with Interventional Neurology on , but family declined due to advanced age.  Neurology recommended daily aspirin 81 mg daily and Lipitor 20 mg daily.  Tolerated transfer to [facility]  inpatient rehab unit on  without incident.   Today's Information: No acute events overnight.  Sitting in chair comfortably.  Reports good sleep and appetite.  Last BM .  Vital signs at goal with no recent recorded fevers.  No labs or imaging today.    Review of patient's allergies indicates:   Allergen Reactions    Hydrocodone-acetaminophen         Current Facility-Administered Medications:     acetaminophen tablet 650 mg, 650 mg, Oral, Q4H PRN, Nimesh Taveras, HONGP, 650 mg at 23 0750    ALPRAZolam tablet 0.25 mg, 0.25 mg, Oral, TID PRN, Nimesh Taveras FNP    amLODIPine tablet 10 mg, 10 mg, Oral, Daily, Seema Colon FNP, 10 mg at  "12/26/23 0529    aspirin EC tablet 81 mg, 81 mg, Oral, Daily, Nitin, Nimesh A, FNP, 81 mg at 12/26/23 0750    atorvastatin tablet 20 mg, 20 mg, Oral, Daily, Nitin, Nimesh A, FNP, 20 mg at 12/26/23 0750    benzonatate capsule 100 mg, 100 mg, Oral, TID PRN, Nitin, Nimesh A, FNP    bisacodyL suppository 10 mg, 10 mg, Rectal, Daily PRN, Nitin, Nimesh A, FNP    docusate sodium capsule 100 mg, 100 mg, Oral, BID, Nitin, Nimesh A, FNP, 100 mg at 12/26/23 0750    finasteride tablet 5 mg, 5 mg, Oral, Daily, Nitin, Nimesh A, FNP, 5 mg at 12/26/23 0750    hydrALAZINE injection 10 mg, 10 mg, Intravenous, Q4H PRN, Nitin, Nimesh A, FNP    hydrOXYzine pamoate capsule 50 mg, 50 mg, Oral, Nightly PRN, Nitin, Nimesh A, FNP    labetalol 20 mg/4 mL (5 mg/mL) IV syring, 10 mg, Intravenous, Q4H PRN, Nitin, Nimesh A, FNP, 10 mg at 12/26/23 0623    metoprolol injection 10 mg, 10 mg, Intravenous, Q2H PRN, Nitin, Nimesh A, FNP    nitroGLYCERIN SL tablet 0.4 mg, 0.4 mg, Sublingual, Q5 Min PRN, Nitin, Nimesh A, FNP    ondansetron disintegrating tablet 4 mg, 4 mg, Oral, Q6H PRN, Nitin, Nimesh A, FNP    ondansetron disintegrating tablet 8 mg, 8 mg, Oral, Q6H PRN, Nitin, Nimesh A, FNP    polyethylene glycol packet 17 g, 17 g, Oral, BID PRN, Nitin, Nimesh A, FNP, 17 g at 12/22/23 1654    promethazine tablet 25 mg, 25 mg, Oral, Q6H PRN, Nitin, Nimesh A, FNP    tamsulosin 24 hr capsule 0.4 mg, 0.4 mg, Oral, QHS, Nimesh Taveras, FNP, 0.4 mg at 12/25/23 2015    traMADoL tablet 50 mg, 50 mg, Oral, Q8H PRN, Nimesh Taveras, FNP     Review of Systems   Complete 12-point review of symptoms negative except for what's mentioned in HPI     Objective:     BP (!) 154/79   Pulse 67   Temp 97.8 °F (36.6 °C) (Oral)   Resp 16   Ht 5' 10" (1.778 m)   Wt 79.5 kg (175 lb 4.3 oz)   SpO2 96%   BMI 25.15 kg/m²        Physical Exam  Vitals reviewed.   HENT:      Head:      " Comments: [place]  Eyes:      Pupils: Pupils are equal, round, and reactive to light.   Cardiovascular:      Rate and Rhythm: Normal rate and regular rhythm.      Comments: Heart sounds: Murmur heard.   Systolic murmur is present with a grade of 3/6.   Pulmonary:      Effort: Pulmonary effort is normal.      Breath sounds: Normal breath sounds.   Abdominal:      General: Bowel sounds are normal.   Musculoskeletal:         General: Normal range of motion.      Comments: Right-sided weakness   Skin:     General: Skin is warm.   Neurological:      General: No focal deficit present.      Mental Status: He is alert and oriented to person, place, and time.      Motor: Weakness present.   Psychiatric:         Mood and Affect: Mood normal.     *MD performed and documented physical examination       Lines/Drains/Airways       Peripheral Intravenous Line  Duration                  Peripheral IV - Single Lumen 12/25/23 2000 20 G Distal;Left;Posterior Forearm <1 day                    Labs  No results found for this or any previous visit (from the past 24 hour(s)).      Radiology  MRI brain without contrast on 12/18/2023, IMPRESSION: Patchy acute ischemic changes in the left posterior frontal and temporal lobes. Moderate chronic microvascular ischemic changes.  Assessment/Plan:     89 y.o. AAM admitted on 12/21/2023     Ischemic CVA   - to left posterior frontal and temporal lobes with right sided weakness  - continue                Aspirin 81 mg daily                Lipitor 20 mg daily   - Consult physiatry for rehab and pain management  - PT/OT/RT/ST to eval and treat     PAD  - stable   - continue                Aspirin 81 mg daily                Lipitor 20 mg daily      HLD  -  FLP outpatient with PCP  - continue                Lipitor 20 mg daily       VHD  -  aortic stenosis  -  to follow-up with cardiology outpatient     HTN  - BP improved  - continue                Norvasc 10 mg daily (initiated 12/23)                 Hydralazine 10 mg every 2 hours as needed for BP > 160/90                Labetalol 10 mg every 2 hours as needed for BP > 160/90  - low sodium diet     Constipation  - stable   - continue  Colace 100 mg BID   Miralax 17 gm BID PRN     BPH  - stable  - continue                Finasteride 5 mg daily                 Flomax 0.4 mg at bedtime     Hypokalemia  -  potassium 3.2  - s/p Potassium 40 mEq x2 doses      VTE Prophylaxis:  SCDs  COVID-19 testing:  unknown  COVID-19 vaccination status:  vaccinated     POA: No  Living will: No  Contacts: Awa Mckee (dtr) [phone]       CODE STATUS: Full Code  Internal Medicine (attending): Alvarez Zayas MD  Physiatry (consulting):  Wilder Pham MD     OUTPATIENT PROVIDERS    [facility]P:  [facility]    Neurology:  Tyron Rahman MD     DISPOSITION:  Vital signs at goal.  No labs today.  Bowel management, sleep hygiene, and appetite at goal.  Continues to progress well with therapies.  Monitor closely.  Notify of any acute changes.  Staffing below.    Staffing 12/26: Incontinent of bowel and bladder. PT: contact guard for sit to stands. Walking 90 -110 feet. OT: Overall max to mod assist. Safety awareness. Severe hearing impairment. Will need 24/7 supervision. Projected discharge 1/5.      Seema Colon NP conducted independent physical examination and assisted with medical documentation.    Total time spent on this encounter including chart review and direct MD + NP 1-on-1 patient interaction: 51 minutes   Over 50% of this time was spent in counseling and coordination of care

## 2023-12-26 NOTE — PT/OT/SLP PROGRESS
Physical Therapy Inpatient Rehab Treatment    Patient Name:  Chichi Mckee   MRN:  [id number]    Recommendations:     Discharge Recommendations:  Low Intensity Therapy   Discharge Equipment Recommendations:     Barriers to discharge: Impaired functional mobility     Assessment:     Chichi Mckee is a 89 y.o. male admitted with a medical diagnosis of Stroke.  He presents with the following impairments/functional limitations:  weakness, impaired endurance, impaired sensation, impaired self care skills, impaired functional mobility, gait instability, impaired balance, visual deficits, decreased upper extremity function, impaired cognition, decreased coordination, decreased lower extremity function, decreased safety awareness .    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 Pt. Has a past medical history of HTN, HLD, PAD, aortic stenosis, bladder mass, BPH, and very [place]    General Precautions: Standard, hearing impaired     Orthopedic Precautions:N/A     Braces: N/A    Rehab Prognosis: Good; patient would benefit from acute skilled PT services to address these deficits and reach maximum level of function.      History:     Past Medical History:   Diagnosis Date    Hypertension        No past surgical history on file.    Subjective     Patient comments: N/A    Respiratory Status: Room air    Patients cultural, spiritual, Restorationist conflicts given the current situation:      Objective:     Communicated with RN prior to session.  Patient found up in chair with peripheral IV  upon PT entry to room.    Pt is Oriented x3 and Alert, Cooperative, and Motivated.    Vitals   Vitals at Rest  BP    HR    O2 Sat    Pain      Vitals With Activity  /73   HR 75   O2 Sat     Pain Pain Rating 1: 0/10       Functional Mobility:        Current   Status  Discharge   Goal   Functional Area: Care Score:    Roll Left and Right   Independent   Sit to Lying   Independent    Lying to Sitting on Side of Bed   Independent   Sit to Stand 3 Set-up/clean-up   Chair/Bed-to-Chair Transfer 3   Set-up/clean-up   Car Transfer   Set-up/clean-up   Walk 10 Feet 4 Set-up/clean-up   Walk 50 Feet with Two Turns 4  ~98' overall CGA with RW and slowed pace Set-up/clean-up   Walk 150 Feet 88 Set-up/clean-up   Walk 10 Feet Uneven Surface   Supervision or touching assistance   1 Step (Curb)   Supervision or touching assistance   4 Steps   Not applicable   12 Steps   Not applicable   Picking Up Object   Independent   Wheel 50 Feet with Two Turns   Not applicable   Wheel 150 Feet   Not applicable       Therapeutic Activities and Exercises:  Patient educated on role of acute care PT and PT POC, safety while in hospital including calling nurse for mobility, and call light usage  Patient educated about importance of OOB mobility and remaining up in chair most of the day.    Washer toss with recreational therapy working on standing tolerance and R sided inattention     Activity Tolerance: Good    Patient left up in chair with all lines intact, call button in reach, and chair alarm on.    Education provided: roles and goals of PT/PTA, transfer training, bed mob, gait training, balance training, safety awareness, and strengthening exercises    Expected compliance: High compliance    Plan:     During this hospitalization, patient to be seen 5 x/week (5-7 x week) to address the identified rehab impairments via gait training, therapeutic activities, therapeutic exercises, neuromuscular re-education, wheelchair management/training and progress toward the following goals:    GOALS:   Multidisciplinary Problems       Physical Therapy Goals          Problem: Physical Therapy    Goal Priority Disciplines Outcome Goal Variances Interventions   Physical Therapy Goal     PT, PT/OT Ongoing, Progressing     Description: Bed Mobility:  Roll left and right with setup/clean-up assist.  Sit to supine transfer with setup/clean-up  assist.  Supine to sit transfer with setup/clean-up assist.    Transfers:  Sit to stand transfer with setup/clean-up assist using RW.  Bed to chair transfer with setup/clean-up assist Stand Step  using RW.  Car transfer with supervision/touching assist using RW.   an object from the ground in standing position with supervision/touching assist using RW.    Mobility:  Ambulate 150 feet with supervision/touching assist using RW.  Ambulate 15 feet on uneven surfaces/ramps with supervision/touching assist using RW.  Pt ascended/descended a 4 inch curb with supervision/touching assist using RW.  Ascend/descend 4 stairs with supervision/touching assist using bilateral handrails.                        Plan of Care Expires:  12/28/23  PT Next Visit Date: 12/28/23  Plan of Care reviewed with: patient    Additional Information:         Time Tracking:     Therapy Time  PT Received On: 12/26/23  PT Start Time: 0830  PT Stop Time: 0930  PT Total Time (min): 60 min   PT Individual: 60  Missed Time:    Time Missed due to:      Billable Minutes: Gait Training 15, Therapeutic Activity 30, and Therapeutic Exercise 15    12/26/2023

## 2023-12-27 PROBLEM — I10 ESSENTIAL HYPERTENSION: Status: ACTIVE | Noted: 2019-04-04

## 2023-12-27 PROBLEM — R53.1 RIGHT SIDED WEAKNESS: Status: ACTIVE | Noted: 2023-12-27

## 2023-12-27 PROBLEM — N32.89 BLADDER MASS: Status: ACTIVE | Noted: 2019-04-04

## 2023-12-27 PROBLEM — N17.9 AKI (ACUTE KIDNEY INJURY): Status: RESOLVED | Noted: 2023-12-27 | Resolved: 2023-12-27

## 2023-12-27 PROBLEM — E78.5 HLD (HYPERLIPIDEMIA): Status: ACTIVE | Noted: 2023-12-27

## 2023-12-27 PROBLEM — N40.0 BPH (BENIGN PROSTATIC HYPERPLASIA): Status: ACTIVE | Noted: 2023-12-27

## 2023-12-27 PROBLEM — N17.9 AKI (ACUTE KIDNEY INJURY): Status: ACTIVE | Noted: 2023-12-27

## 2023-12-27 PROBLEM — I38 VHD (VALVULAR HEART DISEASE): Status: ACTIVE | Noted: 2023-12-27

## 2023-12-27 PROCEDURE — 97530 THERAPEUTIC ACTIVITIES: CPT | Mod: CQ

## 2023-12-27 PROCEDURE — 97112 NEUROMUSCULAR REEDUCATION: CPT

## 2023-12-27 PROCEDURE — 99900035 HC TECH TIME PER 15 MIN (STAT)

## 2023-12-27 PROCEDURE — 97530 THERAPEUTIC ACTIVITIES: CPT

## 2023-12-27 PROCEDURE — 11800000 HC REHAB PRIVATE ROOM

## 2023-12-27 PROCEDURE — 97110 THERAPEUTIC EXERCISES: CPT

## 2023-12-27 PROCEDURE — 97535 SELF CARE MNGMENT TRAINING: CPT

## 2023-12-27 PROCEDURE — 97116 GAIT TRAINING THERAPY: CPT | Mod: CQ

## 2023-12-27 PROCEDURE — 25000003 PHARM REV CODE 250: Performed by: NURSE PRACTITIONER

## 2023-12-27 PROCEDURE — 97110 THERAPEUTIC EXERCISES: CPT | Mod: CQ

## 2023-12-27 PROCEDURE — 99223 1ST HOSP IP/OBS HIGH 75: CPT | Mod: ,,, | Performed by: NURSE PRACTITIONER

## 2023-12-27 RX ORDER — POLYETHYLENE GLYCOL 3350 17 G/17G
17 POWDER, FOR SOLUTION ORAL 2 TIMES DAILY
Status: DISCONTINUED | OUTPATIENT
Start: 2023-12-27 | End: 2024-01-05 | Stop reason: HOSPADM

## 2023-12-27 RX ADMIN — DOCUSATE SODIUM 100 MG: 100 CAPSULE, LIQUID FILLED ORAL at 07:12

## 2023-12-27 RX ADMIN — FINASTERIDE 5 MG: 5 TABLET, FILM COATED ORAL at 07:12

## 2023-12-27 RX ADMIN — ATORVASTATIN CALCIUM 20 MG: 10 TABLET, FILM COATED ORAL at 07:12

## 2023-12-27 RX ADMIN — DOCUSATE SODIUM 100 MG: 100 CAPSULE, LIQUID FILLED ORAL at 09:12

## 2023-12-27 RX ADMIN — ASPIRIN 81 MG: 81 TABLET, COATED ORAL at 07:12

## 2023-12-27 RX ADMIN — TAMSULOSIN HYDROCHLORIDE 0.4 MG: 0.4 CAPSULE ORAL at 09:12

## 2023-12-27 RX ADMIN — AMLODIPINE BESYLATE 10 MG: 5 TABLET ORAL at 07:12

## 2023-12-27 NOTE — PROGRESS NOTES
12/27/23 1030   Rec Therapy Time Calculation   Date of Treatment 12/27/23   Rec Start Time 1030   Rec Stop Time 1100   Rec Total Time (min) 30 min   Time   Treatment time 2 units   Charges   $Therapeutic Exercise 2 units   Precautions   General Precautions fall;hearing impaired   Orthopedic Precautions  N/A   Braces N/A   Pain/Comfort   Pain Rating 1 no pain   Vital Signs   Pulse 67   /63   OTHER   Rehab identified problem list/impairments weakness;impaired endurance;impaired functional mobility;gait instability;impaired cognition;decreased coordination;decreased upper extremity function;decreased lower extremity function;decreased safety awareness   Values/Beliefs/Spiritual Care   Spiritual, Cultural Beliefs, Islam Practices, Values that Affect Care no   Overall Level of Functioning   Activity Tolerance Independent   Dynamic Sitting Balance/Reaching Independent   Dynamic Standing Balance/Reaching Mod A   Right UE Coodination/Dexterity Mod A   Left UE Coordination/Dexterity Mod Indep   Problem Solving/Sequencing Skills Min A   Memory Recall Min A   R/L Neglect/Inattention Standby assist   Attention Span Standby Assist   Social Interaction Mod Indep   Recreational Therapy Short Term Goals   Short Term Goal 1 Progression Progressing   Short Term Goal 2 Progression Progressing   Short Term Goal 3 Progression Progressing   Recreational Therapy Long Term Goals   Long Term Goal 1 Progression Progressing   Long Term Goal 2 Progression Progressing   Long Term Goal 3 Progression Progressing   Plan   Patient to be seen Daily   Planned Duration 3 weeks   Treatments Planned Balance training;Coordination;Energy conservation training;Fine motor;Safety education   Treatment plan/goals estblished with Patient/Caregiver Yes

## 2023-12-27 NOTE — PT/OT/SLP PROGRESS
"Occupational Therapy Inpatient Rehab Treatment    Name: Chichi Mckee  MRN: [id number]    Assessment:  Chichi Mckee is a 89 y.o. male admitted with a medical diagnosis of Stroke.  He presents with the following impairments/functional limitations:  weakness, impaired endurance, impaired sensation, impaired self care skills, impaired functional mobility, gait instability, impaired balance, visual deficits, impaired cognition, decreased upper extremity function, decreased lower extremity function, decreased safety awareness, decreased coordination, impaired coordination, impaired fine motor.    General Precautions: Standard, hearing impaired     Orthopedic Precautions:N/A     Braces: N/A    Rehab Prognosis: Good; patient would benefit from acute skilled OT services to address these deficits and reach maximum level of function.      History:     Past Medical History:   Diagnosis Date    Hypertension        No past surgical history on file.    Subjective     Orientation: Oriented x4    Chief Complaint: none    Patient/Family Comments/goals: "I slept good"    Vitals   Vitals at Rest  /73   HR 72   O2 Sat    Pain      Vitals With Activity  /63   HR 67   O2 Sat    Pain      Respiratory Status: Room air    Patients cultural, spiritual, Yazidism conflicts given the current situation: no       Objective:     Patient found up in chair sleeping with peripheral IV and  upon OT entry to room.    Mobility   Patient completed:  Sit to Stand Transfer with maximal assistance from recliner chair with rolling walker with significant posterior lean  Stand to Sit Transfer with minimum assistance with rolling walker  Toilet Transfer Step Transfer technique with minimum assistance with  rolling walker and min A for sit to stand from toilet.    Functional Mobility  In room mobility with min assist with RW once up from sit to stand.    ADLs   Current Status   Oral Hygiene 4   Upper Body Dressing 3   Lower Body Dressing 2 "   Toileting Hygiene 3   Toilet Transfer 3   Putting On, Taking Off Footwear 2     Limiting Factors for ADLs: motor, sensory, endurance, limited ROM, balance, weakness, visual, perceptual, coordination, cognition, and safety awareness     Therapeutic Exercise  To increase AROM and attention to R side, pt performed UBE for 5 min forward and 5 mins backward with rest break between in seated position.    Neuromuscular Re-Education  To improve FMC of R hand and improve stereognosis and proprioception, pt performed lateral pinching of marbles one at a time from container and placed into another container with split lid. Verbal cues needed for R arm to clear first container once marble in the hand. Pt did not drop any marbles in the process. Also stereognosis testing attempted bilaterally; however, pt unable to identify any items without looking at them. Therefore, significant decreased sensation in bilateral hands.   Pt also performed peg board activity on flat horizontal plane. Pt able to place pegs with RUE but had difficult time with correct color pattern, possibly due to hearing impairment and not understanding complete directions or color blindness.    Patient left up in chair with all lines intact, call button in reach, and chair alarm on.     Education provided: Roles and goals of OT, ADLs, transfer training, modified goals, sequencing, safety precautions, and fall prevention    Multidisciplinary Problems       Occupational Therapy Goals          Problem: Occupational Therapy    Goal Priority Disciplines Outcome Interventions   Occupational Therapy Goal     OT, PT/OT Ongoing, Progressing    Description: ADLs:  Pt to perform oral care tasks with set up while standing at sink  Pt to perform UB dressing with SPV.  Pt to perform LB dressing with Partial mod assist with AE as needed.   Pt to perform putting on/off footwear task with Partial mod with AE as needed.  Pt to perform toileting with SPV.  Pt to perform bathing  with Partial mod assist with AE as needed.    Functional Transfers:  Pt to perform toilet transfers with incidental touching.  Pt to perform a tub transfer with incidental touching.    Balance, Strengthening, Endurance, Balance:  Pt to consistently demonstrate adherence to orthopedic precautions during all ADL's as instructed by OT.  Pt to demonstrate good dynamic standing balance as required to perform ADL's from standing level.  Pt to demonstrate good BUE strength during functional task   Pt to demonstrate consistent adherence to breathing control and energy conservation techniques as educated by OT.                        Time Tracking   **Pt seen BID [id number] & [phone]  OT Received On: 12/27/23  Time In  (0800, 1000)     Time Out  (0900, 1030)  Total Time    Therapy Time: OT Individual: 90  Missed Time:    Missed Time Reason:      Billable Minutes: Self Care/Home Management 45, Therapeutic Exercise 15, and Neuromuscular Re-education 30    12/27/2023

## 2023-12-27 NOTE — PLAN OF CARE
Problem: Rehabilitation (IRF) Plan of Care  Goal: Plan of Care Review  Outcome: Ongoing, Progressing  Flowsheets (Taken 12/26/2023 2357)  Plan of Care Reviewed With: patient  Goal: Absence of New-Onset Illness or Injury  Outcome: Ongoing, Progressing  Intervention: Prevent Fall and Fall Injury  Flowsheets (Taken 12/26/2023 2357)  Safety Promotion/Fall Prevention:   assistive device/personal item within reach   bed alarm set   diversional activities provided   Fall Risk reviewed with patient/family   Fall Risk signage in place   high risk medications identified   gait belt with ambulation   lighting adjusted   medications reviewed   nonskid shoes/socks when out of bed   instructed to call staff for mobility   side rails raised x 2   room near unit station   /camera at bedside  Intervention: Prevent Skin Injury  Flowsheets (Taken 12/26/2023 2357)  Skin Protection:   adhesive use limited   incontinence pads utilized   tubing/devices free from skin contact   transparent dressing maintained  Intervention: Prevent Infection  Flowsheets (Taken 12/26/2023 2357)  Infection Prevention:   environmental surveillance performed   equipment surfaces disinfected   hand hygiene promoted   personal protective equipment utilized   rest/sleep promoted   single patient room provided  Intervention: Prevent VTE (Venous Thromboembolism)  Flowsheets (Taken 12/26/2023 2357)  VTE Prevention/Management:   ambulation promoted   bleeding precations maintained   bleeding risk assessed   bleeding risk factor(s) identified, provider notified   dorsiflexion/plantar flexion performed   fluids promoted  Goal: Optimal Comfort and Wellbeing  Outcome: Ongoing, Progressing  Intervention: Provide Person-Centered Care  Flowsheets (Taken 12/26/2023 2357)  Trust Relationship/Rapport:   care explained   choices provided   empathic listening provided   questions encouraged   emotional support provided   questions answered   reassurance  provided   thoughts/feelings acknowledged  Intervention: Monitor Pain and Promote Comfort  Flowsheets (Taken 12/26/2023 2357)  Pain Management Interventions:   pain management plan reviewed with patient/caregiver   quiet environment facilitated   relaxation techniques promoted   position adjusted     Problem: Skin Injury Risk Increased  Goal: Skin Health and Integrity  Outcome: Ongoing, Progressing  Intervention: Optimize Skin Protection  Flowsheets (Taken 12/26/2023 2357)  Pressure Reduction Techniques:   frequent weight shift encouraged   pressure points protected   weight shift assistance provided  Skin Protection:   adhesive use limited   incontinence pads utilized   tubing/devices free from skin contact   transparent dressing maintained  Intervention: Promote and Optimize Oral Intake  Flowsheets (Taken 12/26/2023 2357)  Oral Nutrition Promotion:   physical activity promoted   rest periods promoted     Problem: Fall Injury Risk  Goal: Absence of Fall and Fall-Related Injury  Outcome: Ongoing, Progressing  Intervention: Identify and Manage Contributors  Flowsheets (Taken 12/26/2023 2357)  Self-Care Promotion:   independence encouraged   BADL personal objects within reach   BADL personal routines maintained   safe use of adaptive equipment encouraged  Medication Review/Management:   medications reviewed   high-risk medications identified  Intervention: Promote Injury-Free Environment  Flowsheets (Taken 12/26/2023 2357)  Safety Promotion/Fall Prevention:   assistive device/personal item within reach   bed alarm set   diversional activities provided   Fall Risk reviewed with patient/family   Fall Risk signage in place   high risk medications identified   gait belt with ambulation   lighting adjusted   medications reviewed   nonskid shoes/socks when out of bed   instructed to call staff for mobility   side rails raised x 2   room near unit station   /camera at bedside

## 2023-12-27 NOTE — PLAN OF CARE
Received call from carmita Billings ([phone]).  Reviewed discharge plans that I shared with son Sander and carmita Sellers yesterday.  Still awaiting pics of the home environment and confirmation that the pt/wife have the RW and BSC at the home already (Awa will f/u).  Also inquired re: which  agency they would like to use for patient.  Awa confirmed that she will also attend family training 1/1 at 1300 (along with her sister and perhaps son Sander).

## 2023-12-27 NOTE — PT/OT/SLP PROGRESS
Physical Therapy Inpatient Rehab Treatment    Patient Name:  Chichi Mckee   MRN:  [id number]    Recommendations:     Discharge Recommendations:  Low Intensity Therapy   Discharge Equipment Recommendations:     Barriers to discharge: Impaired functional mobility     Assessment:     Chichi Mckee is a 89 y.o. male admitted with a medical diagnosis of Stroke.  He presents with the following impairments/functional limitations:  weakness, impaired endurance, impaired sensation, impaired self care skills, impaired functional mobility, gait instability, impaired balance, visual deficits, impaired cognition, decreased upper extremity function, decreased lower extremity function, decreased safety awareness, decreased coordination, impaired coordination, impaired fine motor .    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 Pt. Has a past medical history of HTN, HLD, PAD, aortic stenosis, bladder mass, BPH, and very [place]    General Precautions: Standard, hearing impaired     Orthopedic Precautions:N/A     Braces: N/A    Rehab Prognosis: Fair; patient would benefit from acute skilled PT services to address these deficits and reach maximum level of function.      History:     Past Medical History:   Diagnosis Date    Hypertension        No past surgical history on file.    Subjective     Patient comments: n/a    Respiratory Status: Room air    Patients cultural, spiritual, Episcopal conflicts given the current situation:      Objective:     Communicated with rn prior to session.  Patient found up in chair with peripheral IV  upon PT entry to room.      Vitals   Vitals at Rest  /79   HR 76   O2 Sat    Pain      Vitals With Activity  BP (!) 148/75   HR 76   O2 Sat     Pain         Functional Mobility:        Current   Status  Discharge   Goal   Functional Area: Care Score:    Roll Left and Right   Independent   Sit to Lying   Independent   Lying to Sitting  on Side of Bed   Independent   Sit to Stand 4  W/rw Set-up/clean-up   Chair/Bed-to-Chair Transfer 4  W/rw Set-up/clean-up   Car Transfer   Set-up/clean-up   Walk 10 Feet  Set-up/clean-up   Walk 50 Feet with Two Turns  Set-up/clean-up   Walk 150 Feet  Set-up/clean-up   Walk 10 Feet Uneven Surface   Supervision or touching assistance   1 Step (Curb)   Supervision or touching assistance   4 Steps 4  Pt preformed step up in 4in curb 5x2 in // bars and cga for safety. Pt required vc for proper technique.  Not applicable   12 Steps   Not applicable   Picking Up Object   Independent   Wheel 50 Feet with Two Turns   Not applicable   Wheel 150 Feet   Not applicable       Therapeutic Activities and Exercises:  Patient performed 2 set(s) of 10 repetitions of the following seated exercises: ankle pumps, long arc quads, marches, hip abduction, and hip adduction for bilateral LE. Patient required skilled PT for instruction of exercises and appropriate cues to perform exercises safely and appropriately.  Pt preformed dynamic standing balance w/rw while therapist changed pt soiled brief and pants.   Pt propelled wc 150ft w/BUE for Ue strengthening.   Activity Tolerance: Good    Patient left up in chair with all lines intact and call button in reach.    Education provided: roles and goals of PT/PTA, transfer training, and strengthening exercises    Expected compliance: High compliance    Plan:     During this hospitalization, patient to be seen 5 x/week (5-7 x week) to address the identified rehab impairments via gait training, therapeutic activities, therapeutic exercises, neuromuscular re-education, wheelchair management/training and progress toward the following goals:    GOALS:   Multidisciplinary Problems       Physical Therapy Goals          Problem: Physical Therapy    Goal Priority Disciplines Outcome Goal Variances Interventions   Physical Therapy Goal     PT, PT/OT Ongoing, Progressing     Description: Bed Mobility:  Roll  left and right with setup/clean-up assist.  Sit to supine transfer with setup/clean-up assist.  Supine to sit transfer with setup/clean-up assist.    Transfers:  Sit to stand transfer with setup/clean-up assist using RW.  Bed to chair transfer with setup/clean-up assist Stand Step  using RW.  Car transfer with supervision/touching assist using RW.   an object from the ground in standing position with supervision/touching assist using RW.    Mobility:  Ambulate 150 feet with supervision/touching assist using RW.  Ambulate 15 feet on uneven surfaces/ramps with supervision/touching assist using RW.  Pt ascended/descended a 4 inch curb with supervision/touching assist using RW.  Ascend/descend 4 stairs with supervision/touching assist using bilateral handrails.                        Plan of Care Expires:  12/28/23  PT Next Visit Date: 12/28/23  Plan of Care reviewed with: patient    Additional Information:         Time Tracking:     Therapy Time  PT Received On: 12/27/23  PT Start Time: 1300  PT Stop Time: 1400  PT Total Time (min): 60 min   PT Individual: 60  Missed Time:    Time Missed due to:      Billable Minutes: Therapeutic Activity 30 and Therapeutic Exercise 30    12/27/2023

## 2023-12-27 NOTE — PLAN OF CARE
Problem: Rehabilitation (IRF) Plan of Care  Goal: Absence of New-Onset Illness or Injury  Outcome: Ongoing, Progressing     Problem: Skin Injury Risk Increased  Goal: Skin Health and Integrity  Outcome: Ongoing, Progressing     Problem: Fall Injury Risk  Goal: Absence of Fall and Fall-Related Injury  Outcome: Ongoing, Progressing

## 2023-12-27 NOTE — PT/OT/SLP PROGRESS
Recreational Therapy Treatment    Date of Treatment: 12/27/23  Start Time: 1030  Stop Time: 1100  Total Time: 30 min  Missed Time:    General Precautions: fall, hearing impaired  Ortho Precautions: N/A  Braces: N/A    Vitals   Vitals at Rest  /71   HR 71   O2 Sat    Pain      Vitals With Activity  /71   HR 71   O2 Sat    Pain        Treatment     Cognitive Skills Building   Cognitive Observation Activity Assist Position Equipment Response            Comment:          Dynamic Activities   Activity Assist Position Equipment Response   Activity 1 Bocce ball contact guard assistance and moderate assistance Standing Rolling walker and Bocce balls good   Comment: Sit to stand was contact guard as was dynamic standing balance/reaching.  Standing tolerance was 4 minutes.  RUE coordination was mod.  Hand over hand assist needed for motor planning of RUE.  Comprehension and direction following skills were min. Hearing deficits interfere with participation.  Coopetative       Fine Motor Activities   Activity Assist Position Equipment Response           Comment:          Additional Info: Recliner to w/c transfer was mod    Goals     Short Term Goals    Goal  Goal Status   Will increase sit to stand to supervision Progressing   Will improved dynamic standing balance/reaching to supervision Progressing   Will increase RUE coordination/dexteriety to min Progressing           Long Term Goals    Goal Goal Status   Will increase standing tolerance to 5 minutes Progressing   Will improve dynamic standing balance/reaching to setup Progressing   Will increase RUE coordination/dexteriety to supervision Progressing

## 2023-12-27 NOTE — PROGRESS NOTES
[facility] ([facility])  Rehab Progress Note    Patient Name: Chichi Mckee  MRN: [id number]  Age: 89 y.o. Sex: male  : 1929  Hospital Length of Stay: 6 days  Date of Service: 2023   Chief Complaint: Ischemic CVA to left posterior frontal and temporal lobes with right sided weakness     Subjective:     Basic Information  Admit Information: 89-year-old  male presented to [facility] ED on 2023 complaining of slurred speech and right upper extremity weakness and numbness starting at 11:00 a.m..  PMH significant for  HTN, HLD, PAD, aortic stenosis, history of bladder mass, and BPH.  Workup significant for CVA.  Code fast initiated.  CT head negative.  T and K initiated at 12:48 p.m..  Admitted to ICU.  CTA head and neck significant for left vertebral artery occluded proximally.  LVEF  55-60% with negative bubble study.  Repeat CT head negative for intracranial hemorrhage.  MRI brain with contrast significant for patchy acute ischemic changes in the left posterior frontal and temporal lobes.  Diagnostic angiogram planned with Interventional Neurology on , but family declined due to advanced age.  Neurology recommended daily aspirin 81 mg daily and Lipitor 20 mg daily.  Tolerated transfer to [facility]  inpatient rehab unit on  without incident.   Today's Information: No acute events overnight.  Sitting in chair comfortably.  Reports good sleep and appetite.  Last BM , reported small and hard.  Vital signs at goal with no recent recorded fevers.  No labs or imaging today.    Review of patient's allergies indicates:   Allergen Reactions    Hydrocodone-acetaminophen         Current Facility-Administered Medications:     acetaminophen tablet 650 mg, 650 mg, Oral, Q4H PRN, Nitin, Nimesh A, FNP, 650 mg at 23 0750    ALPRAZolam tablet 0.25 mg, 0.25 mg, Oral, TID PRN, Nitin, Nimesh A, FNP    amLODIPine tablet 10 mg, 10 mg, Oral, Daily, Darlene  "Seema B, FNP, 10 mg at 12/27/23 0743    aspirin EC tablet 81 mg, 81 mg, Oral, Daily, Nitin, Nimesh A, FNP, 81 mg at 12/27/23 0743    atorvastatin tablet 20 mg, 20 mg, Oral, Daily, Nitin, Nimesh A, FNP, 20 mg at 12/27/23 0743    benzonatate capsule 100 mg, 100 mg, Oral, TID PRN, Nitin, Nimesh A, FNP    bisacodyL suppository 10 mg, 10 mg, Rectal, Daily PRN, Nitin, Nimesh A, FNP    docusate sodium capsule 100 mg, 100 mg, Oral, BID, Nitin, Nimesh A, FNP, 100 mg at 12/27/23 0743    finasteride tablet 5 mg, 5 mg, Oral, Daily, Nitin, Nimesh A, FNP, 5 mg at 12/27/23 0743    hydrALAZINE injection 10 mg, 10 mg, Intravenous, Q4H PRN, Nitin, Nimesh A, FNP    hydrOXYzine pamoate capsule 50 mg, 50 mg, Oral, Nightly PRN, Nitin, Nimesh A, FNP    labetalol 20 mg/4 mL (5 mg/mL) IV syring, 10 mg, Intravenous, Q4H PRN, Nitin, Nimesh A, FNP, 10 mg at 12/26/23 0623    metoprolol injection 10 mg, 10 mg, Intravenous, Q2H PRN, Nitin, Nimesh A, FNP    nitroGLYCERIN SL tablet 0.4 mg, 0.4 mg, Sublingual, Q5 Min PRN, Nitin, Nimesh A, FNP    ondansetron disintegrating tablet 4 mg, 4 mg, Oral, Q6H PRN, Nitin, Nimesh A, FNP    ondansetron disintegrating tablet 8 mg, 8 mg, Oral, Q6H PRN, Nitin, Nimesh A, FNP    polyethylene glycol packet 17 g, 17 g, Oral, BID PRN, Nitin, Nimesh A, FNP, 17 g at 12/22/23 1654    promethazine tablet 25 mg, 25 mg, Oral, Q6H PRN, Nitin, Nimesh A, FNP    tamsulosin 24 hr capsule 0.4 mg, 0.4 mg, Oral, QHS, Nimesh Taveras FNP, 0.4 mg at 12/26/23 2123    traMADoL tablet 50 mg, 50 mg, Oral, Q8H PRN, Nimesh Taveras FNP     Review of Systems   Complete 12-point review of symptoms negative except for what's mentioned in HPI     Objective:     /64   Pulse 73   Temp 97.9 °F (36.6 °C) (Oral)   Resp 20   Ht 5' 10" (1.778 m)   Wt 79.5 kg (175 lb 4.3 oz)   SpO2 98%   BMI 25.15 kg/m²        Physical Exam  Vitals reviewed.   HENT:     "  Head:      Comments: Teller  Eyes:      Pupils: Pupils are equal, round, and reactive to light.   Cardiovascular:      Rate and Rhythm: Normal rate and regular rhythm.      Comments: Heart sounds: Murmur heard.   Systolic murmur is present with a grade of 3/6.   Pulmonary:      Effort: Pulmonary effort is normal.      Breath sounds: Normal breath sounds.   Abdominal:      General: Bowel sounds are normal.   Musculoskeletal:         General: Normal range of motion.      Comments: Right-sided weakness   Skin:     General: Skin is warm.   Neurological:      General: No focal deficit present.      Mental Status: He is alert and oriented to person, place, and time.      Motor: Weakness present.   Psychiatric:         Mood and Affect: Mood normal.     *MD performed and documented physical examination       Lines/Drains/Airways       Peripheral Intravenous Line  Duration                  Peripheral IV - Single Lumen 12/25/23 2000 20 G Distal;Left;Posterior Forearm 1 day                    Labs  No results found for this or any previous visit (from the past 24 hour(s)).      Radiology  MRI brain without contrast on 12/18/2023, IMPRESSION: Patchy acute ischemic changes in the left posterior frontal and temporal lobes. Moderate chronic microvascular ischemic changes.  Assessment/Plan:     89 y.o. AAM admitted on 12/21/2023     Ischemic CVA   - to left posterior frontal and temporal lobes with right sided weakness  - continue                Aspirin 81 mg daily                Lipitor 20 mg daily   - Consult physiatry for rehab and pain management  - PT/OT/RT/ST to eval and treat     PAD  - stable   - continue                Aspirin 81 mg daily                Lipitor 20 mg daily      HLD  -  FLP outpatient with PCP  - continue                Lipitor 20 mg daily       VHD  -  aortic stenosis  -  to follow-up with cardiology outpatient     HTN  - BP improved  - continue                Norvasc 10 mg daily (initiated 12/23)                 Hydralazine 10 mg every 2 hours as needed for BP > 160/90                Labetalol 10 mg every 2 hours as needed for BP > 160/90  - low sodium diet     Constipation  - stable   - continue  Colace 100 mg BID   Miralax 17 gm BID (initiated 12/27)     BPH  - stable  - continue                Finasteride 5 mg daily                 Flomax 0.4 mg at bedtime     Hypokalemia  -  potassium 3.2  - s/p Potassium 40 mEq x2 doses      VTE Prophylaxis:  SCDs  COVID-19 testing:  unknown  COVID-19 vaccination status:  vaccinated     POA: No  Living will: No  Contacts: Awa Mckee (dtr) [phone]       CODE STATUS: Full Code  Internal Medicine (attending): Alvarez Zayas MD  Physiatry (consulting):  Wilder Pham MD     OUTPATIENT PROVIDERS    [facility]P:  [facility]    Neurology:  Tyron Rahman MD     DISPOSITION:  Vital signs at goal.  No labs today.  Bowel management, sleep hygiene, and appetite at goal.  BM small and hard this a.m..  Initiate MiraLax 17 g b.i.d..  Continues to progress well with therapies.  Monitor closely.  Notify of any acute changes.     Staffing 12/26: Incontinent of bowel and bladder. PT: contact guard for sit to stands. Walking 90 -110 feet. OT: Overall max to mod assist. Safety awareness. Severe hearing impairment. Will need 24/7 supervision. Projected discharge 1/5.      Seema Colon NP conducted independent physical examination and assisted with medical documentation.

## 2023-12-28 PROCEDURE — 11800000 HC REHAB PRIVATE ROOM

## 2023-12-28 PROCEDURE — 97110 THERAPEUTIC EXERCISES: CPT

## 2023-12-28 PROCEDURE — 97530 THERAPEUTIC ACTIVITIES: CPT

## 2023-12-28 PROCEDURE — 94761 N-INVAS EAR/PLS OXIMETRY MLT: CPT

## 2023-12-28 PROCEDURE — 99233 SBSQ HOSP IP/OBS HIGH 50: CPT | Mod: ,,, | Performed by: NURSE PRACTITIONER

## 2023-12-28 PROCEDURE — 97164 PT RE-EVAL EST PLAN CARE: CPT

## 2023-12-28 PROCEDURE — 25000003 PHARM REV CODE 250: Performed by: NURSE PRACTITIONER

## 2023-12-28 PROCEDURE — 97535 SELF CARE MNGMENT TRAINING: CPT

## 2023-12-28 PROCEDURE — 99900035 HC TECH TIME PER 15 MIN (STAT)

## 2023-12-28 PROCEDURE — 97116 GAIT TRAINING THERAPY: CPT

## 2023-12-28 RX ADMIN — AMLODIPINE BESYLATE 10 MG: 5 TABLET ORAL at 08:12

## 2023-12-28 RX ADMIN — ATORVASTATIN CALCIUM 20 MG: 10 TABLET, FILM COATED ORAL at 08:12

## 2023-12-28 RX ADMIN — POLYETHYLENE GLYCOL 3350 17 G: 17 POWDER, FOR SOLUTION ORAL at 08:12

## 2023-12-28 RX ADMIN — POTASSIUM PHOSPHATE, MONOBASIC AND POTASSIUM PHOSPHATE, DIBASIC 15 MMOL: 224; 236 INJECTION, SOLUTION, CONCENTRATE INTRAVENOUS at 09:12

## 2023-12-28 RX ADMIN — ASPIRIN 81 MG: 81 TABLET, COATED ORAL at 08:12

## 2023-12-28 RX ADMIN — TAMSULOSIN HYDROCHLORIDE 0.4 MG: 0.4 CAPSULE ORAL at 08:12

## 2023-12-28 RX ADMIN — DOCUSATE SODIUM 100 MG: 100 CAPSULE, LIQUID FILLED ORAL at 08:12

## 2023-12-28 RX ADMIN — FINASTERIDE 5 MG: 5 TABLET, FILM COATED ORAL at 08:12

## 2023-12-28 NOTE — PT/OT/SLP RE-EVAL
Recreational Therapy Re-Evaluation      Date of Treatment: 12/28/23  Start Time: 1031  Stop Time: 1100  Total Time: 29 min  Missed Time:     Assessment      Chichi Mckee is a 89 y.o. male admitted with a medical diagnosis of Stroke.  He presents with the following impairments/functional limitations:  weakness, impaired endurance, impaired functional mobility, gait instability, decreased coordination, decreased upper extremity function, decreased lower extremity function, decreased safety awareness, decreased ROM, impaired coordination, impaired fine motor .    Rehab Diagnosis:     Recent Surgery:     General Precautions: Standard, fall, hearing impaired     Orthopedic Precautions:N/A     Braces: N/A    Rehab Prognosis: Good; patient would benefit from acute skilled Recreational Therapy services to address these deficits and reach maximum level of function.      Impairments: Coordination deficits, Endurance deficits, Mobility deficits, Safety awareness deficits, and Strength deficits  Rehab Potential: Good  Treatment Recommendations: Continue with current plan of care   Treatment Diagnosis: Acute L posterior frontal and temporal lobe CVA, TNK, R UE weakness, HTN, HLD, PAD, aortic stenosis, bladder mass, BPH, [place]  Orientation: Identifies self  Affect/Behavior: Appropriate and Cooperative  Safety/Judgement: intact   Basic Command Following: intact  Spiritual Cultural: no        History     Past Medical History:   Diagnosis Date    Hypertension        No past surgical history on file.    Home Environment     Admit Date: 12/21/23  Living Situation  People in Home: spouse, child(anai), adult  Name(s) of People in Home: Awa Mckee  Lives in: house  Patients Responsibilities: Retired, Leisure/play/hobbies  Number of Children: 4  Occupation:Retired:  and     Instrumental Activities of Daily Living     Previous Hand Dominance: Right Current Hand Dominance: Right (RUE weakness)     Other iADL Information:  "       Cognitive Skills Building         Cognitive Observation Activity Assist Position Equipment Response            Comment:      Dynamic Activities      Activity Assist Position Equipment Response   Activity 1 Golf supervision Standing Rolling walker and Golf balls, golf club good   Comment: Bed to w/c transfer was supervision.  Sit to stand was supervision as was dynamic standing balance/reaching,  Standing tolerance was 15 minutes.  RUE coordination was min.  Hand over hand assist was needed for RUE motor planning .  Hearing deficits interfere with problem solving skills.  Remains cooperative    This was a co-treat with PT       Fine Motor Activities      Activity Assist Position Equipment Response           Comment:        Goals     Patient Goals  Patient Goal 1: "To walk better."    Short Term Goals    Goal  Goal Status   Will increase sit to stand to supervision Met   Will improved dynamic standing balance/reaching to supervision Met   Will increase RUE coordination/dexteriety to min Met           Long Term Goals    Goal Goal Status   Will increase standing tolerance to 5 minutes Met   Will improve dynamic standing balance/reaching to setup Progressing   Will increase RUE coordination/dexteriety to supervision Progressing               Plan       Patient to be seen: Daily  Duration: 3 weeks  Treatments planned: Coordination, Energy conservation training, Fine motor, Safety education  Treatment plan/goals established with Patient/Caregiver: Yes     "

## 2023-12-28 NOTE — PROGRESS NOTES
Inpatient Nutrition Assessment    Admit Date: 12/21/2023   Total duration of encounter: 7 days   Patient Age: 89 y.o.    Nutrition Recommendation/Prescription     Continue current diet as tolerated.   Continue Boost Original (240 kcal, 10 g pro/svg) BID to assist pt in meeting est nutrition needs  RD to complete physical assessment at f/u.     Communication of Recommendations:  EMR    Nutrition Assessment     Malnutrition Assessment/Nutrition-Focused Physical Exam  To be completed at f/u     A minimum of two characteristics is recommended for diagnosis of either severe or non-severe malnutrition.    Chart Review    Reason Seen: physician consult for New rehab admit     Malnutrition Screening Tool Results   Have you recently lost weight without trying?: No  Have you been eating poorly because of a decreased appetite?: No   MST Score: 0   Diagnosis:  Left posterior frontal and temporal lobe CVA s/p TNK with rt upper extremity weakness  Hypertension and Stroke  left vertebral artery occlusion  Dysarthria  Aphasia  hyperlipidemia,  PAD  moderate aortic stenosis  bladder mass  BPH   acute kidney injury     Relevant Medical History:   HTN, HLD, PAD, aortic stenosis, bladder mass, [place], and BPH       Scheduled Medications:  amLODIPine, 10 mg, Daily  aspirin, 81 mg, Daily  atorvastatin, 20 mg, Daily  docusate sodium, 100 mg, BID  finasteride, 5 mg, Daily  polyethylene glycol, 17 g, BID  potassium phosphate IVPB, 15 mmol, Once  tamsulosin, 0.4 mg, QHS    Continuous Infusions:   PRN Medications: acetaminophen, ALPRAZolam, benzonatate, bisacodyL, hydrALAZINE, hydrOXYzine pamoate, labetalol, metoprolol, nitroGLYCERIN, ondansetron, ondansetron, promethazine, tramadol    Calorie Containing IV Medications: no significant kcals from medications at this time    Recent Labs   Lab 12/22/23  0524 12/25/23  0528    137   K 3.2* 4.1   CALCIUM 9.3 9.7   PHOS 2.5 2.8   MG 1.80 1.90   CHLORIDE 110 109   CO2 21* 22*   BUN 13.4 17.9  "  CREATININE 1.12 1.14   EGFRNORACEVR >60 60   GLUCOSE 101 93   BILITOT 1.2 0.5   ALKPHOS 77 76   ALT 17 23   AST 17 18   ALBUMIN 3.0* 2.8*   PREALB 9.9* 10.7*   WBC 5.70 5.62   HGB 14.3 13.5*   HCT 43.8 42.1     Nutrition Orders:  Diet Adult Regular      Appetite/Oral Intake: good/% of meals  Factors Affecting Nutritional Intake:  missing teeth  per SLP note   Food/Muslim/Cultural Preferences: none reported  Food Allergies: no known food allergies  Last Bowel Movement: 12/25/23  Wound(s):  None documented     Comments    12/22: Pt sleeping, attempted to rouse pt mult times. Unable to rouse. Noted pt passed MBS on (12/21) on regular/thin liquids. Appetite is good, staff documented pt ate 75% breakfast this am. Also with good appetite at [facility] prior to admit ~75% x 5 meals documented.  LBM on (12/19) - colace on board. Noted large wt discrepancy per chart review. See wt hx below. Suspect wt from (12/16) inaccurate as appears was carried over from (6/29/22).     12/28: Pt with severe hearing impairment and subsequently unable to respond to most questions. CNA assisting feeds with pt at bedside. Pt with good appetite, eating 75 - 100% most meals.  Observed eating ~75% breakfast- using hands to eat. CNA reports pt can use utensils and does use with gravies, stews etc, however prefers to use hands with other items. Drinking liquids and boost. CNA denies pt with any GI issues at this time. No new wt. Unable to complete physical assessment at this time d/t pt eating. Did not observe any physical signs of malnutrition at bedside.     Anthropometrics    Height: 5' 10" (177.8 cm), Height Method: Stated  Last Weight: 79.5 kg (175 lb 4.3 oz) (12/21/23 1717),    BMI (Calculated): 25.1  BMI Classification: normal (BMI 18.5-24.9)        Ideal Body Weight (IBW), Male: 166 lb                                Usual Weight Provided By: EMR weight history    Wt Readings from Last 5 Encounters:   12/21/23 79.5 kg (175 lb " 4.3 oz)   12/16/23 89.8 kg (198 lb)   06/29/22 89.8 kg (198 lb)     Weight Change(s) Since Admission: N/A  Wt Readings from Last 1 Encounters:   12/21/23 1717 79.5 kg (175 lb 4.3 oz)   Admit Weight: 79.5 kg (175 lb 4.3 oz) (12/21/23 1717),      Estimated Needs    Weight Used For Calorie Calculations: 79.5 kg (175 lb 4.3 oz)  Energy Calorie Requirements (kcal): 1873  Energy Need Method: [place]-St Jeor (x 1.3 SF)  Weight Used For Protein Calculations: 79.5 kg (175 lb 4.3 oz)  Protein Requirements: 119 (1.5 g/kg)  Fluid Requirements (mL): 1873    Enteral Nutrition     Patient not receiving enteral nutrition at this time.    Parenteral Nutrition     Patient not receiving parenteral nutrition support at this time.    Evaluation of Received Nutrient Intake    Calories: meeting estimated needs  Protein: meeting estimated needs    Patient Education     Not applicable.    Nutrition Diagnosis     PES:  N/A related to  N/A as evidenced by N/A. (new)       Nutrition Interventions     Intervention(s): general/healthful diet    Goal: Meet greater than 80% of nutritional needs by follow-up. (goal met)  Goal: Maintain weight throughout hospitalization. (goal progressing)    Nutrition Goals & Monitoring     Dietitian will monitor: food and beverage intake, weight, weight change, electrolyte/renal panel, and glucose/endocrine profile    Nutrition Risk/Follow-Up: low (follow-up in 5-7 days)   Please consult if re-assessment needed sooner.

## 2023-12-28 NOTE — PT/OT/SLP PROGRESS
"Occupational Therapy Inpatient Rehab Treatment    Name: Chichi Mckee  MRN: [id number]    Assessment:  Chichi Mckee is a 89 y.o. male admitted with a medical diagnosis of Stroke.  He presents with the following impairments/functional limitations:  weakness, impaired endurance, impaired self care skills, impaired functional mobility, impaired balance, decreased upper extremity function, decreased safety awareness, decreased ROM, impaired coordination, impaired fine motor .    General Precautions: Standard, fall [place] Fall Risk    Orthopedic Precautions:N/A     Braces: N/A    Rehab Prognosis: Good; patient would benefit from acute skilled OT services to address these deficits and reach maximum level of function.      History:     Past Medical History:   Diagnosis Date    Hypertension        No past surgical history on file.    Subjective     Orientation: Identifies self    Chief Complaint: No complaints    Patient/Family Comments/goals: Pt. Smiled /no response    Vitals   Vitals at Rest  /69   HR 83   O2 Sat    Pain          Respiratory Status: Room air    Patients cultural, spiritual, Christianity conflicts given the current situation: no       Objective:     Patient found up in chair with peripheral IV  upon OT entry to room. And telesitter.    Mobility   Patient completed:  Supine to Sit with minimum assistance  Sit to Supine with minimum assistance  Sit to Stand Transfer with minimum assistance with rolling walker  Stand to Sit Transfer with contact guard assistance with rolling walker    Functional Mobility  Pt. Required Min A c management of RW and v/c's to remain in RW for safety. Pt. Attempted "furniture walking" to wash hands.     ADLs   Current Status   Eating     Oral Hygiene 4 standing but sat to shave   Shower, Bathe Self     Upper Body Dressing     Lower Body Dressing 2 Pt. C difficulty managing reacher/doffing/donning around feet/ankles   Toileting Hygiene 4 Pt. Pulled front of pants down and " urinated on floor/front of pants also c large volume of urine in toilet    Toilet Transfer     Putting On, Taking Off Footwear 2 educated on reacher for doffing/donning ; unable to manage      Limiting Factors for ADLs: motor, endurance, limited ROM, weakness, visual, coordination, and [place]        Therapeutic Activities  Pt. In standing performed B UE AROM/FM Ax c foam pegboard and large pegs placed anteriorly. Pt. Tolerated 23 min standing tolerance. Pt. C graded clothespins reaching laterally to retrieve clothespin and then clipped to towel held by OT. Pt. Then manipulated towel in his alp to remove and place back in bucket.    Therapeutic Exercise  Pt. Performed B UE AROM on UBE at 1.5 giles while supported standing via RW x's 5 min. Pt. C 2 # free weights performed 15-20 reps B UE AAROM on R and AROM on L through all joints/planes.           Additional Treatments: Pt. Washed hands, change pants/socks for bed.     LifeStyle Change and Education:             Patient left HOB elevated with call button in reach, bed alarm on, and telesitter on.  .     Education provided: Roles and goals of OT, bed mobility, body mechanics, assistive device, sequencing, safety precautions, and fall prevention    Multidisciplinary Problems       Occupational Therapy Goals          Problem: Occupational Therapy    Goal Priority Disciplines Outcome Interventions   Occupational Therapy Goal     OT, PT/OT Ongoing, Progressing    Description: ADLs:  Pt to perform oral care tasks with set up while standing at sink  Pt to perform UB dressing with SPV.  Pt to perform LB dressing with Partial mod assist with AE as needed.   Pt to perform putting on/off footwear task with Partial mod with AE as needed.  Pt to perform toileting with SPV.  Pt to perform bathing with Partial mod assist with AE as needed.    Functional Transfers:  Pt to perform toilet transfers with incidental touching.  Pt to perform a tub transfer with incidental  touching.    Balance, Strengthening, Endurance, Balance:  Pt to consistently demonstrate adherence to orthopedic precautions during all ADL's as instructed by OT.  Pt to demonstrate good dynamic standing balance as required to perform ADL's from standing level.  Pt to demonstrate good BUE strength during functional task   Pt to demonstrate consistent adherence to breathing control and energy conservation techniques as educated by OT.                        Time Tracking     OT Received On: 12/28/23  Time In 1300     Time Out 1440  Total Time 100 min  Therapy Time: OT Individual: 100  Missed Time:    Missed Time Reason:      Billable Minutes: Self Care/Home Management 30, Therapeutic Activity 40, and Therapeutic Exercise 30    12/28/2023

## 2023-12-28 NOTE — PT/OT/SLP PROGRESS
Physical Therapy Inpatient Rehab Treatment    Patient Name:  Chichi Mckee   MRN:  [id number]    Recommendations:     Discharge Recommendations:  Low Intensity Therapy   Discharge Equipment Recommendations:     Barriers to discharge: Increased level of assist, Ongoing medical treatment, and Impaired functional mobility     Assessment:     Chichi Mckee is a 89 y.o. male admitted with a medical diagnosis of Stroke.  He presents with the following impairments/functional limitations:  weakness, impaired endurance, impaired functional mobility, gait instability, impaired cognition, decreased coordination, decreased upper extremity function, decreased lower extremity function, decreased safety awareness.    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 Pt. Has a past medical history of HTN, HLD, PAD, aortic stenosis, bladder mass, BPH, and very [place]    General Precautions: Standard, hearing impaired     Orthopedic Precautions:N/A     Braces: N/A    Rehab Prognosis: Good; patient would benefit from acute skilled PT services to address these deficits and reach maximum level of function.      History:     Past Medical History:   Diagnosis Date    Hypertension        No past surgical history on file.    Subjective       Respiratory Status: Room air    Patients cultural, spiritual, Mu-ism conflicts given the current situation:      Objective:     Communicated with nurse prior to session.  Patient found supine with peripheral IV  upon PT entry to room.    Vitals   Vitals at Rest  /76   HR 75   O2 Sat    Pain      Vitals With Activity  BP (!) 149/84   HR  94   O2 Sat     Pain         Functional Mobility:        Current   Status  Discharge   Goal   Functional Area: Care Score:    Roll Left and Right 4  CGA Independent   Sit to Lying 4  CGA Independent   Lying to Sitting on Side of Bed 4  CGA Independent   Sit to Stand 4  CGA for safety with RW; cues for  pushing from WC/bed  Set-up/clean-up   Chair/Bed-to-Chair Transfer 4  CGA with RW Set-up/clean-up   Car Transfer 3  Min A with RW Set-up/clean-up   Walk 10 Feet 4  CGA with RW, cues for wider JOSE ANTONIO and lifting R foot to avoid toe drag Set-up/clean-up   Walk 50 Feet with Two Turns 4  CGA with RW, cues for wider JOSE ANTONIO and lifting R foot to avoid toe drag Set-up/clean-up   Walk 150 Feet 4  CGA with RW, cues for wider JOSE ANTONIO and lifting R foot to avoid toe drag Set-up/clean-up   1 Step (Curb) 3  Min A with RW; cues needed for LE sequencing and RW placement Supervision or touching assistance   4 Steps 4  Min A between parallel bars; cues needed for LE sequencing   Not applicable   Picking Up Object 4  CGA with reacher Independent   Wheel 50 Feet with Two Turns 3  Min A with steering Not applicable   Wheel 150 Feet 3  Min A with steering Not applicable       Therapeutic Activities and Exercises:  Participated in golf game in Rec therapy, standing between RW while putting using RUE, CGA while working on dynamic standing balance  Completed seated exercises including marching, LAQ, DF AROM 3x10  Assisted CNA with toilet hygiene at end of treatment; CGA needed to stand while changing soiled pull up    Activity Tolerance: Good    Patient left with seatbelt in wheelchair with all lines intact, call button in reach, and chair alarm on.    Education provided: roles and goals of PT/PTA, transfer training, bed mob, gait training, stair training, balance training, assistive device, wheelchair management, and strengthening exercises    Expected compliance: High compliance    Plan:     During this hospitalization, patient to be seen 5 x/week (5-7 x week) to address the identified rehab impairments via gait training, therapeutic activities, therapeutic exercises, neuromuscular re-education, wheelchair management/training and progress toward the following goals:    GOALS:   Multidisciplinary Problems       Physical Therapy Goals           Problem: Physical Therapy    Goal Priority Disciplines Outcome Goal Variances Interventions   Physical Therapy Goal     PT, PT/OT Ongoing, Progressing     Description: Bed Mobility:  Roll left and right with setup/clean-up assist.  Sit to supine transfer with setup/clean-up assist.  Supine to sit transfer with setup/clean-up assist.    Transfers:  Sit to stand transfer with setup/clean-up assist using RW.  Bed to chair transfer with setup/clean-up assist Stand Step  using RW.  Car transfer with supervision/touching assist using RW.   an object from the ground in standing position with supervision/touching assist using RW.    Mobility:  Ambulate 150 feet with supervision/touching assist using RW.  Ambulate 15 feet on uneven surfaces/ramps with supervision/touching assist using RW.  Pt ascended/descended a 4 inch curb with supervision/touching assist using RW.  Ascend/descend 4 stairs with supervision/touching assist using bilateral handrails.                        Plan of Care Expires:  12/28/23  PT Next Visit Date: 12/28/23  Plan of Care reviewed with: patient    Additional Information:         Time Tracking:     Therapy Time  PT Received On: 01/03/24  PT Start Time: 1030  PT Stop Time: 1200  PT Total Time (min): 90 min   PT Individual: 90  Missed Time:    Time Missed due to:      Billable Minutes: Re-eval 15, Gait Training 30, Therapeutic Activity 30, and Therapeutic Exercise 15    12/28/2023

## 2023-12-28 NOTE — PROGRESS NOTES
12/28/23 1031   Rec Therapy Time Calculation   Date of Treatment 12/28/23   Rec Start Time 1031   Rec Stop Time 1100   Rec Total Time (min) 29 min   Time   Treatment time 2 units   Charges   $Therapeutic Activity 1unit   Precautions   General Precautions fall;hearing impaired   Orthopedic Precautions  N/A   Braces N/A   Pain/Comfort   Pain Rating 1 no pain   OTHER   Rehab identified problem list/impairments weakness;impaired endurance;impaired functional mobility;gait instability;decreased coordination;decreased upper extremity function;decreased lower extremity function;decreased safety awareness;decreased ROM;impaired coordination;impaired fine motor   Values/Beliefs/Spiritual Care   Spiritual, Cultural Beliefs, Sikhism Practices, Values that Affect Care no   Overall Level of Functioning   Activity Tolerance Independent   Dynamic Sitting Balance/Reaching Independent   Dynamic Standing Balance/Reaching Standby Assist   Right UE Coodination/Dexterity Min A   Left UE Coordination/Dexterity Standby Assist   Problem Solving/Sequencing Skills Standby Assist   Memory Recall Standby Assist   R/L Neglect/Inattention Does not occur   Attention Span Mod Indep   Social Interaction Mod Indep   Recreational Therapy Short Term Goals   Short Term Goal 1 Progression Met   Short Term Goal 2 Progression Met   Short Term Goal 3 Progression Met   Recreational Therapy Long Term Goals   Long Term Goal 1 Progression Met   Long Term Goal 2 Progression Progressing   Long Term Goal 3 Progression Progressing   Plan   Patient to be seen Daily   Planned Duration 3 weeks   Treatments Planned Coordination;Energy conservation training;Fine motor;Safety education   Treatment plan/goals estblished with Patient/Caregiver Yes

## 2023-12-28 NOTE — PROGRESS NOTES
[facility] ([facility])  Rehab Progress Note    Patient Name: Chichi Mckee  MRN: [id number]  Age: 89 y.o. Sex: male  : 1929  Hospital Length of Stay: 7 days  Date of Service: 2023   Chief Complaint: Ischemic CVA to left posterior frontal and temporal lobes with right sided weakness     Subjective:     Basic Information  Admit Information: 89-year-old  male presented to [facility] ED on 2023 complaining of slurred speech and right upper extremity weakness and numbness starting at 11:00 a.m..  PMH significant for  HTN, HLD, PAD, aortic stenosis, history of bladder mass, and BPH.  Workup significant for CVA.  Code fast initiated.  CT head negative.  T and K initiated at 12:48 p.m..  Admitted to ICU.  CTA head and neck significant for left vertebral artery occluded proximally.  LVEF  55-60% with negative bubble study.  Repeat CT head negative for intracranial hemorrhage.  MRI brain with contrast significant for patchy acute ischemic changes in the left posterior frontal and temporal lobes.  Diagnostic angiogram planned with Interventional Neurology on , but family declined due to advanced age.  Neurology recommended daily aspirin 81 mg daily and Lipitor 20 mg daily.  Tolerated transfer to [facility]  inpatient rehab unit on  without incident.   Today's Information: No acute events overnight.  Sitting in chair comfortably.  Reports good sleep and appetite.  Last BM .  Vital signs at goal with no recent recorded fevers.  No labs or imaging today.    Review of patient's allergies indicates:   Allergen Reactions    Hydrocodone-acetaminophen         Current Facility-Administered Medications:     acetaminophen tablet 650 mg, 650 mg, Oral, Q4H PRN, Nimesh Taveras, HONGP, 650 mg at 23 0750    ALPRAZolam tablet 0.25 mg, 0.25 mg, Oral, TID PRN, Nimesh Taveras FNP    amLODIPine tablet 10 mg, 10 mg, Oral, Daily, Seema Colon FNP, 10 mg at  12/28/23 0848    aspirin EC tablet 81 mg, 81 mg, Oral, Daily, Nitin, Nimesh A, FNP, 81 mg at 12/28/23 0847    atorvastatin tablet 20 mg, 20 mg, Oral, Daily, Nitin, Nimesh A, FNP, 20 mg at 12/28/23 0847    benzonatate capsule 100 mg, 100 mg, Oral, TID PRN, Nitin, Nimesh A, FNP    bisacodyL suppository 10 mg, 10 mg, Rectal, Daily PRN, Nitin, Nimesh A, FNP    docusate sodium capsule 100 mg, 100 mg, Oral, BID, Nitin, Nimesh A, FNP, 100 mg at 12/28/23 0847    finasteride tablet 5 mg, 5 mg, Oral, Daily, Nitin, Nimesh A, FNP, 5 mg at 12/28/23 0848    hydrALAZINE injection 10 mg, 10 mg, Intravenous, Q4H PRN, Nitin, Nimesh A, FNP    hydrOXYzine pamoate capsule 50 mg, 50 mg, Oral, Nightly PRN, Nitin, Nimesh A, FNP    labetalol 20 mg/4 mL (5 mg/mL) IV syring, 10 mg, Intravenous, Q4H PRN, Nitin, Nimesh A, FNP, 10 mg at 12/26/23 0623    metoprolol injection 10 mg, 10 mg, Intravenous, Q2H PRN, Nitin, Nimesh A, FNP    nitroGLYCERIN SL tablet 0.4 mg, 0.4 mg, Sublingual, Q5 Min PRN, Nitin, Nimesh A, FNP    ondansetron disintegrating tablet 4 mg, 4 mg, Oral, Q6H PRN, Nitin, Nimesh A, FNP    ondansetron disintegrating tablet 8 mg, 8 mg, Oral, Q6H PRN, Nitin, Nimesh A, FNP    polyethylene glycol packet 17 g, 17 g, Oral, BID, Seema Colon FNP, 17 g at 12/28/23 0847    potassium phosphate 15 mmol in dextrose 5 % (D5W) 250 mL infusion, 15 mmol, Intravenous, Once, Seema Colon FNP, Last Rate: 62.5 mL/hr at 12/28/23 0926, 15 mmol at 12/28/23 0926    promethazine tablet 25 mg, 25 mg, Oral, Q6H PRN, Nitin, Nimesh A, FNP    tamsulosin 24 hr capsule 0.4 mg, 0.4 mg, Oral, QHS, Nitin, Nimesh A, FNP, 0.4 mg at 12/27/23 2121    traMADoL tablet 50 mg, 50 mg, Oral, Q8H PRN, Nitin, Nimesh A, FNP     Review of Systems   Complete 12-point review of symptoms negative except for what's mentioned in HPI     Objective:     /63   Pulse 72   Temp 98 °F (36.7  "°C) (Oral)   Resp 18   Ht 5' 10" (1.778 m)   Wt 79.5 kg (175 lb 4.3 oz)   SpO2 96%   BMI 25.15 kg/m²        Physical Exam  Vitals reviewed.   HENT:      Head:      Comments: [place]  Eyes:      Pupils: Pupils are equal, round, and reactive to light.   Cardiovascular:      Rate and Rhythm: Normal rate and regular rhythm.      Comments: Heart sounds: Murmur heard.   Systolic murmur is present with a grade of 3/6.   Pulmonary:      Effort: Pulmonary effort is normal.      Breath sounds: Normal breath sounds.   Abdominal:      General: Bowel sounds are normal.   Musculoskeletal:         General: Normal range of motion.      Comments: Right-sided weakness   Skin:     General: Skin is warm.   Neurological:      General: No focal deficit present.      Mental Status: He is alert and oriented to person, place, and time.      Motor: Weakness present.   Psychiatric:         Mood and Affect: Mood normal.     *MD performed and documented physical examination       Lines/Drains/Airways       Peripheral Intravenous Line  Duration                  Peripheral IV - Single Lumen 12/25/23 2000 20 G Distal;Left;Posterior Forearm 2 days                    Labs  No results found for this or any previous visit (from the past 24 hour(s)).      Radiology  MRI brain without contrast on 12/18/2023, IMPRESSION: Patchy acute ischemic changes in the left posterior frontal and temporal lobes. Moderate chronic microvascular ischemic changes.  Assessment/Plan:     89 y.o. AAM admitted on 12/21/2023     Ischemic CVA   - to left posterior frontal and temporal lobes with right sided weakness  - continue                Aspirin 81 mg daily                Lipitor 20 mg daily   - Consult physiatry for rehab and pain management  - PT/OT/RT/ST to eval and treat     PAD  - stable   - continue                Aspirin 81 mg daily                Lipitor 20 mg daily      HLD  -  FLP outpatient with PCP  - continue                Lipitor 20 mg daily       VHD  -  " aortic stenosis  -  to follow-up with cardiology outpatient     HTN  - BP improved  - continue                Norvasc 10 mg daily (initiated 12/23)                Hydralazine 10 mg every 2 hours as needed for BP > 160/90                Labetalol 10 mg every 2 hours as needed for BP > 160/90  - low sodium diet     Constipation  - stable   - continue  Colace 100 mg BID   Miralax 17 gm BID (initiated 12/27)     BPH  - stable  - continue                Finasteride 5 mg daily                 Flomax 0.4 mg at bedtime     Hypokalemia  -  potassium 3.2  - s/p Potassium 40 mEq x2 doses      VTE Prophylaxis:  SCDs  COVID-19 testing:  unknown  COVID-19 vaccination status:  vaccinated     POA: No  Living will: No  Contacts: Awa Mckee (dtr) [phone]       CODE STATUS: Full Code  Internal Medicine (attending): Alvarez Zayas MD  Physiatry (consulting):  Wilder Pham MD     OUTPATIENT PROVIDERS    [facility]:  [facility]    Neurology:  Tyron Rahman MD     DISPOSITION:  Vital signs at goal.  No labs today.  Bowel management, sleep hygiene, and appetite at goal.  Continues to progress well with therapies.  Monitor closely.  Notify of any acute changes.     Staffing 12/26: Incontinent of bowel and bladder. PT: contact guard for sit to stands. Walking 90 -110 feet. OT: Overall max to mod assist. Safety awareness. Severe hearing impairment. Will need 24/7 supervision. Projected discharge 1/5.      Seema Colon NP conducted independent physical examination and assisted with medical documentation.

## 2023-12-28 NOTE — PROGRESS NOTES
Dos 12/28/23  Patient seen and evaluated in OT today  Continues to participate and make progress toward goals  Working on ADL's and IADL's  Discussed with patient and OT  Reviewed chart and discussed with nursing, Dr Zayas's primary medicine team, as well as Anyi Dockery, my NP  Agree with present POC  Subjective  HPI:  89 year old BM with a PMH of HTN, HLD, PAD, aortic stenosis, bladder mass, Very [place], and BPH presented to the ED as a Code Fast on 12/16/2023 with complaints of slurred speech and RUE weakness and numbness starting at 11:00 a.m. that morning.  CT head was negative.  Lab work revealed BRINA.  He was given TNK at 12:48 p.m. and admitted to ICU. 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 There is a long segment area of multifocal narrowing in the basilar artery which could represent vasospasm or areas of multifocal narrowing.  60-70% stenosis in the proximal left internal carotid artery secondary to plaque.  No large vessel occlusion is seen.  Prelim echo showed EF 55-60%, negative bubble study.  24 hr repeat CT head showed no acute intracranial hemorrhage.  MRI brain without contrast showed patchy acute ischemic changes in the left posterior frontal and temporal lobes.  Moderate chronic microvascular ischemic changes. Patient kept in ICU additional 24 hours for frequent neuro checks. Patient originally going to undergo DSA, but family decided against it, due to patient's age. VS have remained stable, BRIAN resolved.  The patient was deemed suitable for transfer to the floor. Care was transferred to hospital medicine for further management. On 12/20, BP ranging 139/69- 187/93, HR ranging 54-81 bpm. Amlodipine increased to 10mg due to elevated BP. Started Atorvastatin 20mg daily per neuro. PT/OT evals completed with deficits noted with recommendation for high intensity therapy needed. Speech swallowing eval showed no signs of aspiration so recommended regular diet. Patient is  AAOx3; on standard aspiration precautions.   Participating with therapy. Functional status includes moderate assist needed for transfers using RW, moderate assist for balance while standing for grooming task of brushing teeth, CGA for walking 230 ft with RW, minimal assist for bed mobility, moderate assist for lower body dressing, and moderate assist for toileting. Patient was evaluated, accepted, and admitted to inpatient rehab to improve functional status. Transferred to [facility] on 12/21 without incident.     12/28: Seen with OT, seated in WC with BUE weighted exercises. Tolerating therapy without complaint. VSSAF with noted SBP elevation this afternoon (149/77). IM following.        Review of Systems   Depression/Anxiety: no complaints     ALPRAZolam tablet 0.25 mg TID PRN Anxiety  Pain: denies     acetaminophen tablet 650 mg q4h PRN mild pain  traMADoL tablet 50 mg q8h PRN mod/severe pain  Bowels/Bladder: last BM 12/28  Appetite: good     Sleep: good                Physical Exam  General: well-developed, well-nourished, in no acute distress, [place]  Respiratory: equal chest rise, no SOB, no audible wheeze  Cardiovascular: regular rate and rhythm, no edema  Gastrointestinal: soft, non-tender, non-distended   Musculoskeletal: right sided weakness  Integumentary: no rashes or skin lesions present  Neurologic: cranial nerves intact, right sided weakness  *MD performed and documented physical examination             Assessment/Plan  Hospital   Stroke   Right sided weakness     Non-Hospital   Bladder mass   Essential hypertension   HLD (hyperlipidemia)   VHD (valvular heart disease)   BPH (benign prostatic hyperplasia)       Wounds: none noted  Precautions: fall risk  Bracing: RW  Swallowing: Regular Diet  Function: Tolerating therapy. Continue PT/OT  VTE Prophylaxis: SCDs  Code Status: FULL CODE   Discharge:Lives with his spouse and daughter in [place] in a single-story home with a ramp with bilateral rails to enter the  residence. Completed 3rd grade. He was in the [employer]. 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 Per the patients son, the family is nearby. Their daughter lives with them. The family takes care of the household and yard work and manages finances and medications. They will have assistance from the family after the rehab stay. Children: (4).  Date 1/5 Friday.                 Vanessa Dockery NP, conducted additional independent physical examination and assisted with medical documentation.

## 2023-12-29 PROCEDURE — 94761 N-INVAS EAR/PLS OXIMETRY MLT: CPT

## 2023-12-29 PROCEDURE — 25000003 PHARM REV CODE 250: Performed by: NURSE PRACTITIONER

## 2023-12-29 PROCEDURE — 11800000 HC REHAB PRIVATE ROOM

## 2023-12-29 PROCEDURE — 99900035 HC TECH TIME PER 15 MIN (STAT)

## 2023-12-29 PROCEDURE — 97116 GAIT TRAINING THERAPY: CPT | Mod: CQ

## 2023-12-29 PROCEDURE — 97168 OT RE-EVAL EST PLAN CARE: CPT

## 2023-12-29 PROCEDURE — 94799 UNLISTED PULMONARY SVC/PX: CPT

## 2023-12-29 PROCEDURE — 97110 THERAPEUTIC EXERCISES: CPT | Mod: CQ

## 2023-12-29 PROCEDURE — 97535 SELF CARE MNGMENT TRAINING: CPT

## 2023-12-29 PROCEDURE — 97530 THERAPEUTIC ACTIVITIES: CPT | Mod: CQ

## 2023-12-29 PROCEDURE — 97530 THERAPEUTIC ACTIVITIES: CPT

## 2023-12-29 RX ADMIN — DOCUSATE SODIUM 100 MG: 100 CAPSULE, LIQUID FILLED ORAL at 08:12

## 2023-12-29 RX ADMIN — ATORVASTATIN CALCIUM 20 MG: 10 TABLET, FILM COATED ORAL at 08:12

## 2023-12-29 RX ADMIN — AMLODIPINE BESYLATE 10 MG: 5 TABLET ORAL at 05:12

## 2023-12-29 RX ADMIN — FINASTERIDE 5 MG: 5 TABLET, FILM COATED ORAL at 08:12

## 2023-12-29 RX ADMIN — POLYETHYLENE GLYCOL 3350 17 G: 17 POWDER, FOR SOLUTION ORAL at 08:12

## 2023-12-29 RX ADMIN — TAMSULOSIN HYDROCHLORIDE 0.4 MG: 0.4 CAPSULE ORAL at 08:12

## 2023-12-29 RX ADMIN — ASPIRIN 81 MG: 81 TABLET, COATED ORAL at 08:12

## 2023-12-29 NOTE — PLAN OF CARE
Sent reminder email to son Sander re: family training scheduled Mon, 1/1, at 1300, the need to email home images, the need for him to choose a HH agency (list included in email), and the need to let me know if the dtrs found the RW and BSC at pt's home (so I will know if they need [facility] be [facility] if not).  Will await response.

## 2023-12-29 NOTE — PT/OT/SLP PROGRESS
Physical Therapy Inpatient Rehab Treatment    Patient Name:  Chichi Mckee   MRN:  [id number]    Recommendations:     Discharge Recommendations:  Low Intensity Therapy   Discharge Equipment Recommendations:     Barriers to discharge: Increased level of assist, Ongoing medical treatment, and Impaired functional mobility     Assessment:     Chichi Mckee is a 89 y.o. male admitted with a medical diagnosis of Stroke.  He presents with the following impairments/functional limitations:  weakness, impaired endurance, impaired self care skills, impaired functional mobility, impaired balance, decreased upper extremity function, decreased safety awareness, decreased ROM, impaired coordination, impaired fine motor .    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 Pt. Has a past medical history of HTN, HLD, PAD, aortic stenosis, bladder mass, BPH, and very [place]    General Precautions: Standard, hearing impaired     Orthopedic Precautions:N/A     Braces: N/A    Rehab Prognosis: Good; patient would benefit from acute skilled PT services to address these deficits and reach maximum level of function.      History:     Past Medical History:   Diagnosis Date    Hypertension        No past surgical history on file.    Subjective         Respiratory Status: Room air    Patients cultural, spiritual, Episcopalian conflicts given the current situation:      Objective:     Communicated with nurse prior to session.  Patient found up in chair with peripheral IV  upon PT entry to room.        Vitals   Vitals at Rest  /72   HR 95   O2 Sat    Pain      Vitals With Activity  /62   HR  88   O2 Sat     Pain         Functional Mobility:        Current   Status  Discharge   Goal   Functional Area: Care Score:    Sit to Stand 4  CGA with RW  Set-up/clean-up   Wheel 50 Feet with Two Turns 3  Min A with steering Not applicable       Therapeutic Activities and Exercises:  Pt  completed seated therex including : DF AROM, LAQ, marching bilaterally 3x10 with cues for upright posture  Pt participated in bowling with recreational therapy, working on dynamic standing balance with CGA and RW    Activity Tolerance: Good    Patient left up in chair with all lines intact, call button in reach, and PTA present.    Education provided: roles and goals of PT/PTA, transfer training, balance training, assistive device, wheelchair management, and strengthening exercises    Expected compliance: High compliance    Plan:     During this hospitalization, patient to be seen 5 x/week (5-7 x week) to address the identified rehab impairments via gait training, therapeutic activities, therapeutic exercises, neuromuscular re-education, wheelchair management/training and progress toward the following goals:    GOALS:   Multidisciplinary Problems       Physical Therapy Goals          Problem: Physical Therapy    Goal Priority Disciplines Outcome Goal Variances Interventions   Physical Therapy Goal     PT, PT/OT Ongoing, Progressing     Description: Bed Mobility:  Roll left and right with setup/clean-up assist.  Sit to supine transfer with setup/clean-up assist.  Supine to sit transfer with setup/clean-up assist.    Transfers:  Sit to stand transfer with setup/clean-up assist using RW.  Bed to chair transfer with setup/clean-up assist Stand Step  using RW.  Car transfer with supervision/touching assist using RW.   an object from the ground in standing position with supervision/touching assist using RW.    Mobility:  Ambulate 150 feet with supervision/touching assist using RW.  Ambulate 15 feet on uneven surfaces/ramps with supervision/touching assist using RW.  Pt ascended/descended a 4 inch curb with supervision/touching assist using RW.  Ascend/descend 4 stairs with supervision/touching assist using bilateral handrails.                        Plan of Care Expires:  01/03/24  PT Next Visit Date:  01/03/24  Plan of Care reviewed with: patient    Additional Information:         Time Tracking:     Therapy Time  PT Received On: 12/29/23  PT Start Time: 1000  PT Stop Time: 1100  PT Total Time (min): 60 min   PT Individual: 60  Missed Time:    Time Missed due to:      Billable Minutes: Therapeutic Activity 30 and Therapeutic Exercise 30    12/29/2023

## 2023-12-29 NOTE — PROGRESS NOTES
12/29/23 1031   Rec Therapy Time Calculation   Date of Treatment 12/29/23   Rec Start Time 1031   Rec Stop Time 1100   Rec Total Time (min) 29 min   Time   Treatment time 2 units   Charges   $Therapeutic Activity 1unit   Precautions   General Precautions fall   Orthopedic Precautions  N/A   Braces N/A   Pain/Comfort   Pain Rating 1 no pain   Vital Signs   Pulse 80   BP (!) 148/76   OTHER   Rehab identified problem list/impairments weakness;impaired functional mobility;gait instability;visual deficits;decreased coordination;decreased upper extremity function;decreased lower extremity function;decreased safety awareness;impaired fine motor;impaired coordination   Values/Beliefs/Spiritual Care   Spiritual, Cultural Beliefs, Muslim Practices, Values that Affect Care no   Overall Level of Functioning   Activity Tolerance Independent   Dynamic Sitting Balance/Reaching Independent   Dynamic Standing Balance/Reaching Mod Indep   Right UE Coodination/Dexterity Mod A   Left UE Coordination/Dexterity Standby Assist   Problem Solving/Sequencing Skills Standby Assist   Memory Recall Standby Assist   R/L Neglect/Inattention Does not occur   Attention Span Mod Indep   Social Interaction Mod Indep   Recreational Therapy Short Term Goals   Short Term Goal 1 Progression Met   Short Term Goal 2 Progression Met   Short Term Goal 3 Progression Progressing   Recreational Therapy Long Term Goals   Long Term Goal 1 Progression Met   Long Term Goal 2 Progression Met   Long Term Goal 3 Progression Progressing   Plan   Patient to be seen Daily   Planned Duration 2 weeks   Treatments Planned Coordination;Energy conservation training;Fine motor;Safety education   Treatment plan/goals estblished with Patient/Caregiver Yes

## 2023-12-29 NOTE — PT/OT/SLP RE-EVAL
"Occupational Therapy Inpatient Rehab Re-Evaluation    Name: Chichi Mckee  MRN: [id number]    Recommendations:     Discharge Recommendations:  Low Intensity Therapy   Discharge Equipment Recommendations: bedside commode   Barriers to discharge:  decreased functional mobility, decreased safety awareness    Assessment:  Chichi Mckee is a 89 y.o. male admitted with a medical diagnosis of Stroke.  He presents with the following impairments/functional limitations:  weakness, impaired endurance, impaired sensation, impaired self care skills, impaired functional mobility, gait instability, impaired balance, impaired cognition, decreased upper extremity function, decreased lower extremity function, decreased safety awareness.    General Precautions: Standard, fall     Orthopedic Precautions:N/A     Braces: N/A    Rehab Prognosis: Good; patient would benefit from acute skilled OT services to address these deficits and reach maximum level of function.      History:     Past Medical History:   Diagnosis Date    Hypertension        No past surgical history on file.    Subjective     Orientation: Oriented x3, not oriented to time    Chief Complaint: none    Patient/Family Comments/goals: "to get better"    Vitals  Vitals at Rest  /77   HR 76   O2 Sat     Pain Pain Rating 1: 0/10     Vitals With Activity  /73   HR 82   O2 Sat     Pain Pain Rating 1: 0/10     Respiratory Status: Room air    Patients cultural, spiritual, Zoroastrian conflicts given the current situation: no       Living Environment   Living Environment  People in Home: spouse, child(anai), adult  Name(s) of People in Home: Awa Mckee  Living Arrangements: house  Home Accessibility: wheelchair accessible  Number of Stairs, Main Entrance: none  Home Layout: Able to live on 1st floor  Living Environment Comment: To enter on ramp  Equipment Currently Used at Home: rollator  Shower Setup: Tub/Shower combo with TTB and HHS    Prior Level of Function  BADL: " Supervision or Set-up Assistance    IADL: Maximum Assistance    Equipment used at home: bath bench, walker, rolling, grab bar.  DME owned (not currently used): none.      Upon discharge, patient will have assistance from wife and dtr.    Objective:     Patient found supine with bed alarm with peripheral IV  upon OT entry to room.    Mobility   Patient completed:  Supine to Sit with contact guard assistance  Sit to Stand Transfer with contact guard assistance with rolling walker  Stand to Sit Transfer with contact guard assistance with rolling walker  Toilet Transfer Step Transfer technique with contact guard assistance with  rolling walker  Tub Transfer Step Transfer technique with minimum assistance with rolling walker assist with bringing R leg over tub's edge    Functional Mobility   In room mobility with RW with CGA for ADLs.    ADLs     Current Status   Oral Hygiene 4 standing at sink   Shower, Bathe Self 3 assist with bottom and lower legs/feet   Upper Body Dressing 4    Lower Body Dressing 2 assist with threading pants onto feet, able to pull up over hips   Toileting Hygiene 3 stood to urinate, assist needed to aim penis into toilet.   Toilet Transfer 4    Putting On, Taking Off Footwear 2 assist with threading socks but able to pull up rest of way, assist with shoes     Limiting Factors for ADLs: motor, sensory, endurance, limited ROM, balance, weakness, cognition, and safety awareness    Exams     ROM:          -       WFL but decreased proprioception with movement    Hand Dominance: Right    ROM Hand  Left Hand: WFL  Right Hand: WFL    Strength  Overall Strength:          -       WFL but weaker on T than left     Strength:   WFL but weaker on R    Sensation  Left:          -       WNL  Right:          -       Deficits, Upper   Upper Extremity  Comment   Hot/Cold Impaired        Coordination:      -       Impaired  Right hand, finger to nose  , Right hand thumb/finger opposition skills  , and Right  hand, manipulation of objects      Tone  Left: WNL  Right: WNL    Visual/Perceptual  Intact    Cognition:   WFL but confused at times    Patient left up in chair with all lines intact, call button in reach, and chair alarm on.    Education provided: Roles and goals of OT, ADLs, transfer training, bed mobility, modified goals, sequencing, safety precautions, and fall prevention    Multidisciplinary Problems       Occupational Therapy Goals          Problem: Occupational Therapy    Goal Priority Disciplines Outcome Interventions   Occupational Therapy Goal     OT, PT/OT Ongoing, Progressing    Description: ADLs:  Pt to perform oral care tasks with set up while standing at sink  Pt to perform UB dressing with SPV.  Pt to perform LB dressing with Partial mod assist with AE as needed.   Pt to perform putting on/off footwear task with Partial mod with AE as needed.  Pt to perform toileting with SPV.  Pt to perform bathing with Partial mod assist with AE as needed.    Functional Transfers:  Pt to perform toilet transfers with incidental touching.  Pt to perform a tub transfer with incidental touching.    Balance, Strengthening, Endurance, Balance:  Pt to consistently demonstrate adherence to orthopedic precautions during all ADL's as instructed by OT.  Pt to demonstrate good dynamic standing balance as required to perform ADL's from standing level.  Pt to demonstrate good BUE strength during functional task   Pt to demonstrate consistent adherence to breathing control and energy conservation techniques as educated by OT.                        Plan     During this hospitalization, patient to be seen 5 x/week (5-7 days per week) to address the identified rehab impairments via self-care/home management, therapeutic activities, therapeutic exercises, neuromuscular re-education, cognitive retraining, sensory integration and progress toward the following goals:    Plan of Care Expires:  01/04/24    Time Tracking     OT Received  On: 12/29/23  Time In 0900     Time Out 1000  Total Time 60 min  Therapy Time: OT Individual: 60  Missed Time:    Missed Time Reason:      Billable Minutes: Re-eval 15 and Self Care/Home Management 45    12/29/2023

## 2023-12-29 NOTE — PT/OT/SLP PROGRESS
Recreational Therapy Treatment    Date of Treatment: 12/29/23  Start Time: 1031  Stop Time: 1100  Total Time: 29 min  Missed Time:    General Precautions: fall  Ortho Precautions: N/A  Braces: N/A    Vitals   Vitals at Rest  /76   HR 80   O2 Sat    Pain      Vitals With Activity  /62   HR 88   O2 Sat    Pain        Treatment     Cognitive Skills Building   Cognitive Observation Activity Assist Position Equipment Response            Comment:          Dynamic Activities   Activity Assist Position Equipment Response   Activity 1 Bowling supervision and moderate assistance Standing Rolling walker and Rubber bowling balls good   Comment: Sit to stand was supervision/setup as was dynamic standing balance/reaching.  Standing tolerance was 15 minutes.  RUE coordination/dexterity was mod.  Hand over hand assist needed with motor planning of RUE.  LUE coordination was supervision.  Comprehension and sequencing skills were supervision.  Cooperative.  Tactile and verbal cues needed for direction following skills        Fine Motor Activities          Activity 1        Comment:          Additional Info: This was co-treat with PT    Goals     Short Term Goals    Goal  Goal Status   Will increase sit to stand to supervision Met   Will improved dynamic standing balance/reaching to supervision Met   Will increase RUE coordination/dexteriety to min Progressing           Long Term Goals    Goal Goal Status   Will increase standing tolerance to 5 minutes Met   Will improve dynamic standing balance/reaching to setup Met   Will increase RUE coordination/dexteriety to supervision Progressing

## 2023-12-29 NOTE — PROGRESS NOTES
[facility] ([facility])  Rehab Progress Note    Patient Name: Chichi Mckee  MRN: [id number]  Age: 89 y.o. Sex: male  : 1929  Hospital Length of Stay: 8 days  Date of Service: 2023   Chief Complaint: Ischemic CVA to left posterior frontal and temporal lobes with right sided weakness     Subjective:     Basic Information  Admit Information: 89-year-old  male presented to [facility] ED on 2023 complaining of slurred speech and right upper extremity weakness and numbness starting at 11:00 a.m..  PMH significant for  HTN, HLD, PAD, aortic stenosis, history of bladder mass, and BPH.  Workup significant for CVA.  Code fast initiated.  CT head negative.  T and K initiated at 12:48 p.m..  Admitted to ICU.  CTA head and neck significant for left vertebral artery occluded proximally.  LVEF  55-60% with negative bubble study.  Repeat CT head negative for intracranial hemorrhage.  MRI brain with contrast significant for patchy acute ischemic changes in the left posterior frontal and temporal lobes.  Diagnostic angiogram planned with Interventional Neurology on , but family declined due to advanced age.  Neurology recommended daily aspirin 81 mg daily and Lipitor 20 mg daily.  Tolerated transfer to [facility]  inpatient rehab unit on  without incident.   Today's Information: No acute events overnight.  Sitting in chair comfortably.  Reports good sleep and appetite.  Last BM .  Vital signs at goal with no recent recorded fevers.  No labs or imaging today.    Review of patient's allergies indicates:   Allergen Reactions    Hydrocodone-acetaminophen         Current Facility-Administered Medications:     acetaminophen tablet 650 mg, 650 mg, Oral, Q4H PRN, Nimesh Taveras, HONGP, 650 mg at 23 0750    ALPRAZolam tablet 0.25 mg, 0.25 mg, Oral, TID PRN, Nimesh Taveras FNP    amLODIPine tablet 10 mg, 10 mg, Oral, Daily, Seema Colon FNP, 10 mg at  "12/29/23 0514    aspirin EC tablet 81 mg, 81 mg, Oral, Daily, Nitin, Nimesh A, FNP, 81 mg at 12/29/23 0825    atorvastatin tablet 20 mg, 20 mg, Oral, Daily, Nitin, Nimesh A, FNP, 20 mg at 12/29/23 0825    benzonatate capsule 100 mg, 100 mg, Oral, TID PRN, Nitin, Nimesh A, FNP    bisacodyL suppository 10 mg, 10 mg, Rectal, Daily PRN, Nitin, Nimesh A, FNP    docusate sodium capsule 100 mg, 100 mg, Oral, BID, Nitin, Nimesh A, FNP, 100 mg at 12/29/23 0825    finasteride tablet 5 mg, 5 mg, Oral, Daily, Nitin, Nimesh A, FNP, 5 mg at 12/29/23 0825    hydrALAZINE injection 10 mg, 10 mg, Intravenous, Q4H PRN, Nitin, Nimesh A, FNP    hydrOXYzine pamoate capsule 50 mg, 50 mg, Oral, Nightly PRN, Nitin, Nimesh A, FNP    labetalol 20 mg/4 mL (5 mg/mL) IV syring, 10 mg, Intravenous, Q4H PRN, Nitin, Nimesh A, FNP, 10 mg at 12/26/23 0623    metoprolol injection 10 mg, 10 mg, Intravenous, Q2H PRN, Nitin, Nimesh A, FNP    nitroGLYCERIN SL tablet 0.4 mg, 0.4 mg, Sublingual, Q5 Min PRN, Nitin, Nimesh A, FNP    ondansetron disintegrating tablet 4 mg, 4 mg, Oral, Q6H PRN, Nitin, Nimesh A, FNP    ondansetron disintegrating tablet 8 mg, 8 mg, Oral, Q6H PRN, Nitin, Nimesh A, FNP    polyethylene glycol packet 17 g, 17 g, Oral, BID, Seema Colon, FNP, 17 g at 12/29/23 0825    promethazine tablet 25 mg, 25 mg, Oral, Q6H PRN, Nitin, Nimesh A, FNP    tamsulosin 24 hr capsule 0.4 mg, 0.4 mg, Oral, QHS, Nimesh Taveras FNP, 0.4 mg at 12/28/23 2037    traMADoL tablet 50 mg, 50 mg, Oral, Q8H PRN, Nimesh Taveras FNP     Review of Systems   Complete 12-point review of symptoms negative except for what's mentioned in HPI     Objective:     BP (!) 146/69   Pulse 80   Temp 98.2 °F (36.8 °C) (Oral)   Resp 16   Ht 5' 10" (1.778 m)   Wt 79.5 kg (175 lb 4.3 oz)   SpO2 100%   BMI 25.15 kg/m²        Physical Exam  Vitals reviewed.   HENT:      Head:      Comments: " [place]  Eyes:      Pupils: Pupils are equal, round, and reactive to light.   Cardiovascular:      Rate and Rhythm: Normal rate and regular rhythm.      Comments: Heart sounds: Murmur heard.   Systolic murmur is present with a grade of 3/6.   Pulmonary:      Effort: Pulmonary effort is normal.      Breath sounds: Normal breath sounds.   Abdominal:      General: Bowel sounds are normal.   Musculoskeletal:         General: Normal range of motion.      Comments: Right-sided weakness   Skin:     General: Skin is warm.   Neurological:      General: No focal deficit present.      Mental Status: He is alert and oriented to person, place, and time.      Motor: Weakness present.   Psychiatric:         Mood and Affect: Mood normal.     *MD performed and documented physical examination       Lines/Drains/Airways       Peripheral Intravenous Line  Duration                  Peripheral IV - Single Lumen 12/25/23 2000 20 G Distal;Left;Posterior Forearm 3 days                    Labs  No results found for this or any previous visit (from the past 24 hour(s)).      Radiology  MRI brain without contrast on 12/18/2023, IMPRESSION: Patchy acute ischemic changes in the left posterior frontal and temporal lobes. Moderate chronic microvascular ischemic changes.  Assessment/Plan:     89 y.o. AAM admitted on 12/21/2023     Ischemic CVA   - to left posterior frontal and temporal lobes with right sided weakness  - continue                Aspirin 81 mg daily                Lipitor 20 mg daily   - Consult physiatry for rehab and pain management  - PT/OT/RT/ST to eval and treat     PAD  - stable   - continue                Aspirin 81 mg daily                Lipitor 20 mg daily      HLD  -  FLP outpatient with PCP  - continue                Lipitor 20 mg daily       VHD  -  aortic stenosis  -  to follow-up with cardiology outpatient     HTN  - BP improved  - continue                Norvasc 10 mg daily (initiated 12/23)                Hydralazine 10  mg every 2 hours as needed for BP > 160/90                Labetalol 10 mg every 2 hours as needed for BP > 160/90  - low sodium diet     Constipation  - stable   - continue  Colace 100 mg BID   Miralax 17 gm BID (initiated 12/27)     BPH  - stable  - continue                Finasteride 5 mg daily                 Flomax 0.4 mg at bedtime     Hypokalemia  -  potassium 3.2  - s/p Potassium 40 mEq x2 doses      VTE Prophylaxis:  SCDs  COVID-19 testing:  unknown  COVID-19 vaccination status:  vaccinated     POA: No  Living will: No  Contacts: Awa Mckee (dtr) [phone]       CODE STATUS: Full Code  Internal Medicine (attending): Alvarez Zayas MD  Physiatry (consulting):  Wilder Pham MD     OUTPATIENT PROVIDERS    [facility]  [facility]    Neurology:  Tyron Rahman MD     DISPOSITION:  Vital signs at goal.  No labs today.  Bowel management, sleep hygiene, and appetite at goal.  Continues to progress well with therapies.  Monitor closely.  Notify of any acute changes.     Staffing 12/26: Incontinent of bowel and bladder. PT: contact guard for sit to stands. Walking 90 -110 feet. OT: Overall max to mod assist. Safety awareness. Severe hearing impairment. Will need 24/7 supervision. Projected discharge 1/5.      Seema Colon NP conducted independent physical examination and assisted with medical documentation.

## 2023-12-30 PROCEDURE — 99900035 HC TECH TIME PER 15 MIN (STAT)

## 2023-12-30 PROCEDURE — 97110 THERAPEUTIC EXERCISES: CPT

## 2023-12-30 PROCEDURE — 25000003 PHARM REV CODE 250: Performed by: NURSE PRACTITIONER

## 2023-12-30 PROCEDURE — 97530 THERAPEUTIC ACTIVITIES: CPT

## 2023-12-30 PROCEDURE — 11800000 HC REHAB PRIVATE ROOM

## 2023-12-30 PROCEDURE — 94761 N-INVAS EAR/PLS OXIMETRY MLT: CPT

## 2023-12-30 RX ADMIN — FINASTERIDE 5 MG: 5 TABLET, FILM COATED ORAL at 08:12

## 2023-12-30 RX ADMIN — POLYETHYLENE GLYCOL 3350 17 G: 17 POWDER, FOR SOLUTION ORAL at 08:12

## 2023-12-30 RX ADMIN — AMLODIPINE BESYLATE 10 MG: 5 TABLET ORAL at 08:12

## 2023-12-30 RX ADMIN — DOCUSATE SODIUM 100 MG: 100 CAPSULE, LIQUID FILLED ORAL at 08:12

## 2023-12-30 RX ADMIN — ATORVASTATIN CALCIUM 20 MG: 10 TABLET, FILM COATED ORAL at 08:12

## 2023-12-30 RX ADMIN — TAMSULOSIN HYDROCHLORIDE 0.4 MG: 0.4 CAPSULE ORAL at 08:12

## 2023-12-30 RX ADMIN — ASPIRIN 81 MG: 81 TABLET, COATED ORAL at 08:12

## 2023-12-30 NOTE — PLAN OF CARE
Problem: Physical Therapy  Goal: Physical Therapy Goal  Description: Bed Mobility:  Roll left and right with setup/clean-up assist.  Sit to supine transfer with setup/clean-up assist.  Supine to sit transfer with setup/clean-up assist.    Transfers:  Sit to stand transfer with setup/clean-up assist using RW.  Bed to chair transfer with setup/clean-up assist Stand Step  using RW.  Car transfer with supervision/touching assist using RW.   an object from the ground in standing position with supervision/touching assist using RW.    Mobility:  Ambulate 150 feet with supervision/touching assist using RW.  Ambulate 15 feet on uneven surfaces/ramps with supervision/touching assist using RW.  Pt ascended/descended a 4 inch curb with supervision/touching assist using RW.  Ascend/descend 4 stairs with supervision/touching assist using bilateral handrails.   Outcome: Ongoing, Progressing

## 2023-12-30 NOTE — PROGRESS NOTES
[facility] ([facility])  Rehab Progress Note    Patient Name: Chichi Mckee  MRN: [id number]  Age: 89 y.o. 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  PMH significant for  HTN, HLD, PAD, aortic stenosis, history of bladder mass, and BPH.  Workup significant for CVA.  Code fast initiated.  CT head negative.  T and K initiated at 12:48 p.m..  Admitted to ICU.  CTA head and neck significant for left vertebral artery occluded proximally.  LVEF  55-60% with negative bubble study.  Repeat CT head negative for intracranial hemorrhage.  MRI brain with contrast significant for patchy acute ischemic changes in the left posterior frontal and temporal lobes.  Diagnostic angiogram planned with Interventional Neurology on , but family declined due to advanced age.  Neurology recommended daily aspirin 81 mg daily and Lipitor 20 mg daily.  Tolerated transfer to [facility]  inpatient rehab unit on  without incident.   Today's Information: No acute events overnight.  Sitting up in bed with wife at bedside.  Reports good sleep and appetite.  Last BM .  Vital signs at goal with no recorded fevers.  No new labs or imaging today.    Review of patient's allergies indicates:   Allergen Reactions    Hydrocodone-acetaminophen         Current Facility-Administered Medications:     acetaminophen tablet 650 mg, 650 mg, Oral, Q4H PRN, Nimesh Taveras FNP, 650 mg at 23 0750    ALPRAZolam tablet 0.25 mg, 0.25 mg, Oral, TID PRN, Nimesh Taveras FNP    amLODIPine tablet 10 mg, 10 mg, Oral, Daily, Seema Colon FNP, 10  "mg at 12/30/23 0821    aspirin EC tablet 81 mg, 81 mg, Oral, Daily, Nitin, Nimesh A, FNP, 81 mg at 12/30/23 0822    atorvastatin tablet 20 mg, 20 mg, Oral, Daily, Nitin, Nimesh A, FNP, 20 mg at 12/30/23 0821    benzonatate capsule 100 mg, 100 mg, Oral, TID PRN, Nitin, Nimesh A, FNP    bisacodyL suppository 10 mg, 10 mg, Rectal, Daily PRN, Nitin, Nimesh A, FNP    docusate sodium capsule 100 mg, 100 mg, Oral, BID, Nitin, Nimesh A, FNP, 100 mg at 12/30/23 0821    finasteride tablet 5 mg, 5 mg, Oral, Daily, Nitin, Nimesh A, FNP, 5 mg at 12/30/23 0821    hydrALAZINE injection 10 mg, 10 mg, Intravenous, Q4H PRN, Nitin, Nimesh A, FNP    hydrOXYzine pamoate capsule 50 mg, 50 mg, Oral, Nightly PRN, Nitin, Nimesh A, FNP    labetalol 20 mg/4 mL (5 mg/mL) IV syring, 10 mg, Intravenous, Q4H PRN, Nitin, Nimesh A, FNP, 10 mg at 12/26/23 0623    metoprolol injection 10 mg, 10 mg, Intravenous, Q2H PRN, Nitin, Nimesh A, FNP    nitroGLYCERIN SL tablet 0.4 mg, 0.4 mg, Sublingual, Q5 Min PRN, Nitin, Nimesh A, FNP    ondansetron disintegrating tablet 4 mg, 4 mg, Oral, Q6H PRN, Nitin, Nimesh A, FNP    ondansetron disintegrating tablet 8 mg, 8 mg, Oral, Q6H PRN, Nitin, Nimesh A, FNP    polyethylene glycol packet 17 g, 17 g, Oral, BID, Seema Colon, FNP, 17 g at 12/30/23 0821    promethazine tablet 25 mg, 25 mg, Oral, Q6H PRN, Nitin, Nimesh A, FNP    tamsulosin 24 hr capsule 0.4 mg, 0.4 mg, Oral, QHS, Nimesh Taveras, FNP, 0.4 mg at 12/29/23 2040    traMADoL tablet 50 mg, 50 mg, Oral, Q8H PRN, Nimesh Taveras, FNP     Review of Systems   Complete 12-point review of symptoms negative except for what's mentioned in HPI     Objective:     BP (!) 148/79   Pulse 80   Temp 98.5 °F (36.9 °C) (Oral)   Resp 18   Ht 5' 10" (1.778 m)   Wt 79.3 kg (174 lb 13.2 oz)   SpO2 96%   BMI 25.08 kg/m²        Physical Exam  Vitals reviewed.   HENT:      Head:      " Comments: [place]  Eyes:      Pupils: Pupils are equal, round, and reactive to light.   Cardiovascular:      Rate and Rhythm: Normal rate and regular rhythm.      Comments: Heart sounds: Murmur heard.   Systolic murmur is present with a grade of 3/6.   Pulmonary:      Effort: Pulmonary effort is normal.      Breath sounds: Normal breath sounds.   Abdominal:      General: Bowel sounds are normal.   Musculoskeletal:         General: Normal range of motion.      Comments: Right-sided weakness   Skin:     General: Skin is warm.   Neurological:      General: No focal deficit present.      Mental Status: He is alert and oriented to person, place, and time.      Motor: Weakness present.   Psychiatric:         Mood and Affect: Mood normal.          Lines/Drains/Airways       Peripheral Intravenous Line  Duration                  Peripheral IV - Single Lumen 12/25/23 2000 20 G Distal;Left;Posterior Forearm 4 days                    Labs  No results found for this or any previous visit (from the past 24 hour(s)).    Radiology  MRI brain without contrast on 12/18/2023, IMPRESSION: Patchy acute ischemic changes in the left posterior frontal and temporal lobes. Moderate chronic microvascular ischemic changes.  Assessment/Plan:     89 y.o. AAM admitted on 12/21/2023     Ischemic CVA   - to left posterior frontal and temporal lobes with right sided weakness  - continue                Aspirin 81 mg daily                Lipitor 20 mg daily   - defer to physiatry for rehab and pain management  - PT/OT/RT/ST following     PAD  - stable   - continue                Aspirin 81 mg daily                Lipitor 20 mg daily      HLD  -  FLP outpatient with PCP  - continue                Lipitor 20 mg daily       VHD  -  aortic stenosis  -  to follow-up with cardiology outpatient     HTN  - BP improved  - continue                Norvasc 10 mg daily (initiated 12/23)                Hydralazine 10 mg every 2 hours as needed for BP > 160/90                 Labetalol 10 mg every 2 hours as needed for BP > 160/90  - low sodium diet     Constipation  - stable   - continue  Colace 100 mg BID   Miralax 17 gm BID (initiated 12/27)     BPH  - stable  - continue                Finasteride 5 mg daily                 Flomax 0.4 mg at bedtime     VTE Prophylaxis:  SCDs  COVID-19 testing:  unknown  COVID-19 vaccination status:  vaccinated     POA: No  Living will: No  Contacts: Awa Mckee (dtr) [phone]       CODE STATUS: Full Code  Internal Medicine (attending): Alvarez Zayas MD  Physiatry (consulting):  Wilder Pham MD     OUTPATIENT PROVIDERS    [facility]    Neurology:  Tyron Rahman MD     DISPOSITION:  Sleep hygiene, bowel maintenance, and appetite at goal.  Vital signs at goal with no recorded fevers.  No new labs or imaging today.  Continue current POC.  Monitor closely.  Notify of acute changes.  Okay to discontinue PIV.      Staffing 12/26: Incontinent of bowel and bladder. PT: contact guard for sit to stands. Walking 90 -110 feet. OT: Overall max to mod assist. Safety awareness. Severe hearing impairment. Will need 24/7 supervision. Projected discharge 1/5.

## 2023-12-30 NOTE — PT/OT/SLP PROGRESS
Physical Therapy Inpatient Rehab Treatment    Patient Name:  Chichi Mckee   MRN:  [id number]    Recommendations:     Discharge Recommendations:  Low Intensity Therapy   Discharge Equipment Recommendations: bedside commode   Barriers to discharge: Decreased caregiver support    Assessment:     Chichi Mckee is a 89 y.o. male admitted with a medical diagnosis of Stroke.  He presents with the following impairments/functional limitations:    decreased endurance and strength.    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 Pt. Has a past medical history of HTN, HLD, PAD, aortic stenosis, bladder mass, BPH, and very [place]    Recent Surgery: * No surgery found *      General Precautions: Standard, hearing impaired     Orthopedic Precautions:N/A     Braces: N/A    Rehab Prognosis: Good; patient would benefit from acute skilled PT services to address these deficits and reach maximum level of function.      History:     Past Medical History:   Diagnosis Date    Hypertension        No past surgical history on file.    Subjective     Chief Complaint: none    Respiratory Status: Room air    Patients cultural, spiritual, Muslim conflicts given the current situation:        Objective:     Communicated with NSG prior to session.  Patient found up in chair with    upon PT entry to room.    Pt is Oriented to situation and Alert.    Vitals   Vitals at Rest  BP    HR    O2 Sat    Pain      Vitals With Activity  BP    HR    O2 Sat    Pain        Functional Mobility:   Transfers:     Sit to Stand: Substantial/maximal assistance  with rolling walker  Toilet Transfer: Substantial/maximal assistance  with  rolling walker  using  Stand Pivot  Gait: Pt ambulates 10 feet x 2 with RW with Substantial/maximal assistance .      Current   Status  Discharge   Goal   Functional Area: Care Score:    Roll Left and Right   Independent   Sit to Lying   Independent   Lying to Sitting  on Side of Bed   Independent   Sit to Stand 2 Set-up/clean-up   Chair/Bed-to-Chair Transfer   Set-up/clean-up   Car Transfer   Set-up/clean-up   Walk 10 Feet 2 Set-up/clean-up   Walk 50 Feet with Two Turns   Set-up/clean-up   Walk 150 Feet   Set-up/clean-up   Walk 10 Feet Uneven Surface   Supervision or touching assistance   1 Step (Curb)   Supervision or touching assistance   4 Steps   Not applicable   12 Steps   Not applicable   Picking Up Object   Independent   Wheel 50 Feet with Two Turns   Not applicable   Wheel 150 Feet   Not applicable       Therapeutic Activities and Exercises:  Pt had a BM while seated on commode. He was able to tolerate x 5 minutes of standing for clean up by therapist. He required max cueing to remain inside of walker and for safety.    Activity Tolerance: Good    Patient left up in chair with call button in reach and daughter present.    Education provided: transfer training, gait training, balance training, and safety awareness    Expected compliance: Moderate compliance    GOALS:   Multidisciplinary Problems       Physical Therapy Goals          Problem: Physical Therapy    Goal Priority Disciplines Outcome Goal Variances Interventions   Physical Therapy Goal     PT, PT/OT Ongoing, Progressing     Description: Bed Mobility:  Roll left and right with setup/clean-up assist.  Sit to supine transfer with setup/clean-up assist.  Supine to sit transfer with setup/clean-up assist.    Transfers:  Sit to stand transfer with setup/clean-up assist using RW.  Bed to chair transfer with setup/clean-up assist Stand Step  using RW.  Car transfer with supervision/touching assist using RW.   an object from the ground in standing position with supervision/touching assist using RW.    Mobility:  Ambulate 150 feet with supervision/touching assist using RW.  Ambulate 15 feet on uneven surfaces/ramps with supervision/touching assist using RW.  Pt ascended/descended a 4 inch curb with  supervision/touching assist using RW.  Ascend/descend 4 stairs with supervision/touching assist using bilateral handrails.                        Plan:     During this hospitalization, patient to be seen 5 x/week (5-7 x week) to address the identified rehab impairments via gait training, therapeutic activities, therapeutic exercises, neuromuscular re-education, wheelchair management/training and progress toward the following goals:    Plan of Care Expires:  01/03/24  PT Next Visit Date: 01/03/24  Plan of Care reviewed with: patient    Additional Information:         Time Tracking:     Therapy Time  PT Received On: 12/30/23  PT Start Time: 1030  PT Stop Time: 1100  PT Total Time (min): 30 min      Missed Time:    Time Missed due to:      Billable Minutes: Therapeutic Activity 15 and Therapeutic Exercise 15    12/30/2023

## 2023-12-31 PROCEDURE — 94761 N-INVAS EAR/PLS OXIMETRY MLT: CPT

## 2023-12-31 PROCEDURE — 99233 SBSQ HOSP IP/OBS HIGH 50: CPT | Mod: ,,, | Performed by: NURSE PRACTITIONER

## 2023-12-31 PROCEDURE — 99900035 HC TECH TIME PER 15 MIN (STAT)

## 2023-12-31 PROCEDURE — 97530 THERAPEUTIC ACTIVITIES: CPT | Mod: CO

## 2023-12-31 PROCEDURE — 97112 NEUROMUSCULAR REEDUCATION: CPT | Mod: CO

## 2023-12-31 PROCEDURE — 25000003 PHARM REV CODE 250: Performed by: NURSE PRACTITIONER

## 2023-12-31 PROCEDURE — 97110 THERAPEUTIC EXERCISES: CPT | Mod: CO

## 2023-12-31 PROCEDURE — 11800000 HC REHAB PRIVATE ROOM

## 2023-12-31 RX ADMIN — AMLODIPINE BESYLATE 10 MG: 5 TABLET ORAL at 07:12

## 2023-12-31 RX ADMIN — TAMSULOSIN HYDROCHLORIDE 0.4 MG: 0.4 CAPSULE ORAL at 08:12

## 2023-12-31 RX ADMIN — DOCUSATE SODIUM 100 MG: 100 CAPSULE, LIQUID FILLED ORAL at 07:12

## 2023-12-31 RX ADMIN — FINASTERIDE 5 MG: 5 TABLET, FILM COATED ORAL at 07:12

## 2023-12-31 RX ADMIN — ATORVASTATIN CALCIUM 20 MG: 10 TABLET, FILM COATED ORAL at 07:12

## 2023-12-31 RX ADMIN — POLYETHYLENE GLYCOL 3350 17 G: 17 POWDER, FOR SOLUTION ORAL at 07:12

## 2023-12-31 RX ADMIN — ASPIRIN 81 MG: 81 TABLET, COATED ORAL at 07:12

## 2023-12-31 NOTE — PT/OT/SLP PROGRESS
"Occupational Therapy Inpatient Rehab Treatment    Name: Chichi Mckee  MRN: [id number]    Assessment:  Chichi Mckee is a 89 y.o. male admitted with a medical diagnosis of Stroke.  He presents with the following impairments/functional limitations:  weakness, impaired endurance, impaired self care skills, impaired functional mobility, gait instability, impaired balance, impaired cognition, decreased upper extremity function, decreased ROM, impaired fine motor, decreased safety awareness .    General Precautions: Standard, fall, hearing impaired     Orthopedic Precautions:N/A     Braces: N/A    Rehab Prognosis: Good; patient would benefit from acute skilled OT services to address these deficits and reach maximum level of function.      History:     Past Medical History:   Diagnosis Date    Hypertension        No past surgical history on file.    Subjective     Orientation: Oriented x4    Chief Complaint: none    Patient/Family Comments/goals: "get home"    Vitals       Vitals With Activity  /80   HR 88   O2 Sat 98   Pain 0/10     Respiratory Status: Room air    Patients cultural, spiritual, Anabaptism conflicts given the current situation: no       Objective:     Patient found up in chair with peripheral IV  upon OT entry to room.    Mobility   Patient completed:  Sit to Stand Transfer with minimum assistance with rolling walker  Stand to Sit Transfer with minimum assistance with rolling walker  Bed to Chair Transfer using Step Transfer technique with minimum assistance with rolling walker  Required cues for hand placement during sit<>stand transfers    Functional Mobility  Functional transfers only      Limiting Factors for ADLs: motor, sensory, endurance, limited ROM, balance, cognition, and safety awareness       Therapeutic Exercise  UB bike 2x5min forward and reverse    Neuromuscular Re-Education  Pt completed multiple activities while standing at tabletop facilitating dynamic standing balance with " functional reaching across midline, fmc, and visual motor components.    Pt also folded clothes while seated at tabletop targeting bilateral coordination and further fmc      Patient left up in chair with all lines intact, call button in reach, and chair alarm on.     Education provided: Roles and goals of OT, transfer training, body mechanics, and fall prevention    Multidisciplinary Problems       Occupational Therapy Goals          Problem: Occupational Therapy    Goal Priority Disciplines Outcome Interventions   Occupational Therapy Goal     OT, PT/OT Ongoing, Progressing    Description: ADLs:  Pt to perform oral care tasks with set up while standing at sink  Pt to perform UB dressing with SPV.  Pt to perform LB dressing with Partial mod assist with AE as needed.   Pt to perform putting on/off footwear task with Partial mod with AE as needed.  Pt to perform toileting with SPV.  Pt to perform bathing with Partial mod assist with AE as needed.    Functional Transfers:  Pt to perform toilet transfers with incidental touching.  Pt to perform a tub transfer with incidental touching.    Balance, Strengthening, Endurance, Balance:  Pt to consistently demonstrate adherence to orthopedic precautions during all ADL's as instructed by OT.  Pt to demonstrate good dynamic standing balance as required to perform ADL's from standing level.  Pt to demonstrate good BUE strength during functional task   Pt to demonstrate consistent adherence to breathing control and energy conservation techniques as educated by OT.                        Time Tracking     OT Received On: 12/31/23  Time In 1300     Time Out 1400  Total Time 60 min  Therapy Time:    Missed Time:    Missed Time Reason:      Billable Minutes: Therapeutic Exercise 15 and Neuromuscular Re-education 45    12/31/2023

## 2023-12-31 NOTE — PROGRESS NOTES
Subjective  HPI:  89 year old BM with a PMH of HTN, HLD, PAD, aortic stenosis, bladder mass, Very [place], and BPH presented to the ED as a Code Fast on 12/16/2023 with complaints of slurred speech and RUE weakness and numbness starting at 11:00 a.m. that morning.  CT head was negative.  Lab work revealed BRIAN.  He was given TNK at 12:48 p.m. and admitted to ICU. 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 There is a long segment area of multifocal narrowing in the basilar artery which could represent vasospasm or areas of multifocal narrowing.  60-70% stenosis in the proximal left internal carotid artery secondary to plaque.  No large vessel occlusion is seen.  Prelim echo showed EF 55-60%, negative bubble study.  24 hr repeat CT head showed no acute intracranial hemorrhage.  MRI brain without contrast showed patchy acute ischemic changes in the left posterior frontal and temporal lobes.  Moderate chronic microvascular ischemic changes. Patient kept in ICU additional 24 hours for frequent neuro checks. Patient originally going to undergo DSA, but family decided against it, due to patient's age. VS have remained stable, BRIAN resolved.  The patient was deemed suitable for transfer to the floor. Care was transferred to hospital medicine for further management. On 12/20, BP ranging 139/69- 187/93, HR ranging 54-81 bpm. Amlodipine increased to 10mg due to elevated BP. Started Atorvastatin 20mg daily per neuro. PT/OT evals completed with deficits noted with recommendation for high intensity therapy needed. Speech swallowing eval showed no signs of aspiration so recommended regular diet. Patient is AAOx3; on standard aspiration precautions.   Participating with therapy. Functional status includes moderate assist needed for transfers using RW, moderate assist for balance while standing for grooming task of brushing teeth, CGA for walking 230 ft with RW, minimal assist for bed mobility, moderate  assist for lower body dressing, and moderate assist for toileting. Patient was evaluated, accepted, and admitted to inpatient rehab to improve functional status. Transferred to [facility] on 12/21 without incident.     12/31: Seen in patient room with family, lying in bed and sleeping. Woken and repositioned to eat breakfast. Family notes patient without his hearing aid in currently. He gives me a thumbs up. No complaints. VSSAF.           Review of Systems   Depression/Anxiety: no complaints     ALPRAZolam tablet 0.25 mg TID PRN Anxiety  Pain: denies     acetaminophen tablet 650 mg q4h PRN mild pain  traMADoL tablet 50 mg q8h PRN mod/severe pain  Bowels/Bladder: last BM 12/31 x 2  Appetite: good     Sleep: good                Physical Exam  General: well-developed, well-nourished, in no acute distress, [place]  Respiratory: equal chest rise, no SOB, no audible wheeze  Cardiovascular: regular rate and rhythm, no edema  Gastrointestinal: soft, non-tender, non-distended   Musculoskeletal: right sided weakness  Integumentary: no rashes or skin lesions present  Neurologic: cranial nerves intact, right sided weakness              Assessment/Plan  Hospital   Stroke   Right sided weakness     Non-Hospital   Bladder mass   Essential hypertension   HLD (hyperlipidemia)   VHD (valvular heart disease)   BPH (benign prostatic hyperplasia)       Wounds: none noted  Precautions: fall risk  Bracing: RW  Swallowing: Regular Diet  Function: Tolerating therapy. Continue PT/OT  VTE Prophylaxis: SCDs  Code Status: FULL CODE   Discharge:Lives with his spouse and daughter in [place] in a single-story home with a ramp with bilateral rails to enter the residence. Completed 3rd grade. He was in the [employer]. 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 Per the patients son, the family is nearby. Their daughter lives with them. The family takes care of the household and yard work and manages finances and  medications. They will have assistance from the family after the rehab stay. Children: (4).  Date 1/5 Friday.

## 2024-01-01 LAB
ALBUMIN SERPL-MCNC: 3.2 G/DL (ref 3.4–4.8)
ALBUMIN/GLOB SERPL: 0.8 RATIO (ref 1.1–2)
ALP SERPL-CCNC: 88 UNIT/L (ref 40–150)
ALT SERPL-CCNC: 48 UNIT/L (ref 0–55)
AST SERPL-CCNC: 28 UNIT/L (ref 5–34)
BASOPHILS # BLD AUTO: 0.05 X10(3)/MCL
BASOPHILS NFR BLD AUTO: 0.8 %
BILIRUB SERPL-MCNC: 0.3 MG/DL
BUN SERPL-MCNC: 27.6 MG/DL (ref 8.4–25.7)
CALCIUM SERPL-MCNC: 10.6 MG/DL (ref 8.8–10)
CHLORIDE SERPL-SCNC: 110 MMOL/L (ref 98–111)
CO2 SERPL-SCNC: 22 MMOL/L (ref 23–31)
CREAT SERPL-MCNC: 1.32 MG/DL (ref 0.73–1.18)
EOSINOPHIL # BLD AUTO: 0.34 X10(3)/MCL (ref 0–0.9)
EOSINOPHIL NFR BLD AUTO: 5.3 %
ERYTHROCYTE [DISTWIDTH] IN BLOOD BY AUTOMATED COUNT: 13.3 % (ref 11.5–17)
GFR SERPLBLD CREATININE-BSD FMLA CKD-EPI: 50 MLS/MIN/1.73/M2
GLOBULIN SER-MCNC: 3.9 GM/DL (ref 2.4–3.5)
GLUCOSE SERPL-MCNC: 115 MG/DL (ref 75–121)
HCT VFR BLD AUTO: 45.5 % (ref 42–52)
HGB BLD-MCNC: 14.4 G/DL (ref 14–18)
IMM GRANULOCYTES # BLD AUTO: 0.03 X10(3)/MCL (ref 0–0.04)
IMM GRANULOCYTES NFR BLD AUTO: 0.5 %
LYMPHOCYTES # BLD AUTO: 1.11 X10(3)/MCL (ref 0.6–4.6)
LYMPHOCYTES NFR BLD AUTO: 17.5 %
MAGNESIUM SERPL-MCNC: 2.2 MG/DL (ref 1.6–2.6)
MCH RBC QN AUTO: 28.3 PG (ref 27–31)
MCHC RBC AUTO-ENTMCNC: 31.6 G/DL (ref 33–36)
MCV RBC AUTO: 89.4 FL (ref 80–94)
MONOCYTES # BLD AUTO: 0.62 X10(3)/MCL (ref 0.1–1.3)
MONOCYTES NFR BLD AUTO: 9.7 %
NEUTROPHILS # BLD AUTO: 4.21 X10(3)/MCL (ref 2.1–9.2)
NEUTROPHILS NFR BLD AUTO: 66.2 %
NRBC BLD AUTO-RTO: 0 %
PHOSPHATE SERPL-MCNC: 2.7 MG/DL (ref 2.3–4.7)
PLATELET # BLD AUTO: 347 X10(3)/MCL (ref 130–400)
PMV BLD AUTO: 10.5 FL (ref 7.4–10.4)
POTASSIUM SERPL-SCNC: 4.1 MMOL/L (ref 3.5–5.1)
PREALB SERPL-MCNC: 22.4 MG/DL (ref 16–42)
PROT SERPL-MCNC: 7.1 GM/DL (ref 5.8–7.6)
RBC # BLD AUTO: 5.09 X10(6)/MCL (ref 4.7–6.1)
SODIUM SERPL-SCNC: 139 MMOL/L (ref 132–146)
WBC # SPEC AUTO: 6.36 X10(3)/MCL (ref 4.5–11.5)

## 2024-01-01 PROCEDURE — 97535 SELF CARE MNGMENT TRAINING: CPT

## 2024-01-01 PROCEDURE — 84100 ASSAY OF PHOSPHORUS: CPT | Performed by: NURSE PRACTITIONER

## 2024-01-01 PROCEDURE — 11800000 HC REHAB PRIVATE ROOM

## 2024-01-01 PROCEDURE — 25000003 PHARM REV CODE 250: Performed by: NURSE PRACTITIONER

## 2024-01-01 PROCEDURE — 80053 COMPREHEN METABOLIC PANEL: CPT | Performed by: NURSE PRACTITIONER

## 2024-01-01 PROCEDURE — 97530 THERAPEUTIC ACTIVITIES: CPT

## 2024-01-01 PROCEDURE — 84134 ASSAY OF PREALBUMIN: CPT | Performed by: NURSE PRACTITIONER

## 2024-01-01 PROCEDURE — 97116 GAIT TRAINING THERAPY: CPT

## 2024-01-01 PROCEDURE — 83735 ASSAY OF MAGNESIUM: CPT | Performed by: NURSE PRACTITIONER

## 2024-01-01 PROCEDURE — 85025 COMPLETE CBC W/AUTO DIFF WBC: CPT | Performed by: NURSE PRACTITIONER

## 2024-01-01 RX ORDER — SODIUM CHLORIDE 9 MG/ML
INJECTION, SOLUTION INTRAVENOUS CONTINUOUS
Status: ACTIVE | OUTPATIENT
Start: 2024-01-01 | End: 2024-01-01

## 2024-01-01 RX ADMIN — ATORVASTATIN CALCIUM 20 MG: 10 TABLET, FILM COATED ORAL at 07:01

## 2024-01-01 RX ADMIN — TAMSULOSIN HYDROCHLORIDE 0.4 MG: 0.4 CAPSULE ORAL at 08:01

## 2024-01-01 RX ADMIN — SODIUM CHLORIDE: 9 INJECTION, SOLUTION INTRAVENOUS at 09:01

## 2024-01-01 RX ADMIN — FINASTERIDE 5 MG: 5 TABLET, FILM COATED ORAL at 07:01

## 2024-01-01 RX ADMIN — POLYETHYLENE GLYCOL 3350 17 G: 17 POWDER, FOR SOLUTION ORAL at 07:01

## 2024-01-01 RX ADMIN — DOCUSATE SODIUM 100 MG: 100 CAPSULE, LIQUID FILLED ORAL at 07:01

## 2024-01-01 RX ADMIN — AMLODIPINE BESYLATE 10 MG: 5 TABLET ORAL at 07:01

## 2024-01-01 RX ADMIN — ASPIRIN 81 MG: 81 TABLET, COATED ORAL at 07:01

## 2024-01-01 RX ADMIN — DOCUSATE SODIUM 100 MG: 100 CAPSULE, LIQUID FILLED ORAL at 08:01

## 2024-01-01 RX ADMIN — POLYETHYLENE GLYCOL 3350 17 G: 17 POWDER, FOR SOLUTION ORAL at 08:01

## 2024-01-01 NOTE — PT/OT/SLP PROGRESS
Physical Therapy Inpatient Rehab Treatment    Patient Name:  Chichi Mckee   MRN:  [id number]    Recommendations:     Discharge Recommendations:  Low Intensity Therapy   Discharge Equipment Recommendations:     Barriers to discharge: Impaired functional mobility     Assessment:     Chichi Mckee is a 89 y.o. male admitted with a medical diagnosis of Stroke.  He presents with the following impairments/functional limitations:  weakness, impaired endurance, impaired self care skills, impaired functional mobility, impaired balance, gait instability, decreased upper extremity function, decreased lower extremity function, decreased safety awareness.    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 Pt. Has a past medical history of HTN, HLD, PAD, aortic stenosis, bladder mass, BPH, and very [place]    General Precautions: Standard, hearing impaired     Orthopedic Precautions:N/A     Braces: N/A    Rehab Prognosis: Fair; patient would benefit from acute skilled PT services to address these deficits and reach maximum level of function.      History:     Past Medical History:   Diagnosis Date    Hypertension        No past surgical history on file.    Subjective     Patient comments: pt agreeable to participate with PT    Respiratory Status: Room air    Patients cultural, spiritual, Anabaptist conflicts given the current situation:      Objective:     Communicated with pt prior to session.  Patient found up in chair with peripheral IV  upon PT entry to room at start of AM and PM sessions.    Pt is Alert and Cooperative.    Vitals   1100: 145/71, 78 bpm  1200: 158/74, 93 bpm    1330: 152/75, 89 bpm  1400: 158/79, 91 bpm    Pain Pain Rating 1: 0/10       Functional Mobility:    Current   Status  Discharge   Goal   Functional Area: Care Score:    Roll Left and Right 4 Independent   Sit to Lying 4  VC for technique Independent   Lying to Sitting on Side of Bed 4  VC  for technique Independent   Sit to Stand 4  VC for technique; with RW Set-up/clean-up   Chair/Bed-to-Chair Transfer 4  VC for technique; with RW Set-up/clean-up   Car Transfer 4  VC for technique; with RW Set-up/clean-up   Walk 10 Feet 4 Set-up/clean-up   Walk 50 Feet with Two Turns 4 Set-up/clean-up   Walk 150 Feet 4  Pt amb 255' with RW in AM session + 125' with RW in PM session. Slow gait speed with NBOS and occasional mild scissoring. R foot drag exhibited during gait which increased severity as fatigue increased. Forward flexed posture with pushing RW out in front. Poor adherence to VC for correction. Gait deficits increased in severity in PM session vs AM session.  Set-up/clean-up   Walk 10 Feet Uneven Surface 3  Pt amb up/down ramp with RW. CGA with VC for safety/technique during ascending, but min A needed for descending to assist with balance and control of RW. Supervision or touching assistance   1 Step (Curb)   Supervision or touching assistance   4 Steps   Not applicable   12 Steps   Not applicable   Picking Up Object   Independent   Wheel 50 Feet with Two Turns   Not applicable   Wheel 150 Feet   Not applicable       Therapeutic Activities and Exercises:  Patient educated on role of acute care PT and PT POC and safety while in hospital including calling nurse for mobility  Patient educated about importance of OOB mobility and remaining up in chair most of the day.    Family training completed in PM session. All questions/concerns addressed as appropriate.    Activity Tolerance: Fair    Patient left up in chair with call button in reach and chair alarm on at completion of both sessions, family present at end of PM session.    Education provided: roles and goals of PT/PTA, transfer training, bed mob, gait training, balance training, safety awareness, body mechanics, assistive device, and fall prevention    Expected compliance: Moderate compliance    Plan:     During this hospitalization, patient to be  seen 5 x/week (5-7 x week) to address the identified rehab impairments via gait training, therapeutic activities, therapeutic exercises, neuromuscular re-education, wheelchair management/training and progress toward the following goals:    GOALS:   Multidisciplinary Problems       Physical Therapy Goals          Problem: Physical Therapy    Goal Priority Disciplines Outcome Goal Variances Interventions   Physical Therapy Goal     PT, PT/OT Ongoing, Progressing     Description: Bed Mobility:  Roll left and right with setup/clean-up assist.  Sit to supine transfer with setup/clean-up assist.  Supine to sit transfer with setup/clean-up assist.    Transfers:  Sit to stand transfer with setup/clean-up assist using RW.  Bed to chair transfer with setup/clean-up assist Stand Step  using RW.  Car transfer with supervision/touching assist using RW.   an object from the ground in standing position with supervision/touching assist using RW.    Mobility:  Ambulate 150 feet with supervision/touching assist using RW.  Ambulate 15 feet on uneven surfaces/ramps with supervision/touching assist using RW.  Pt ascended/descended a 4 inch curb with supervision/touching assist using RW.  Ascend/descend 4 stairs with supervision/touching assist using bilateral handrails.                        Plan of Care Expires:  01/03/24  PT Next Visit Date: 01/03/24  Plan of Care reviewed with: patient, family    Additional Information:         Time Tracking:     Therapy Time  PT Start Time:  (1100; 1330)  PT Stop Time:  (1200; 1400)   PT Individual: 90  Missed Time:    Time Missed due to:      Billable Minutes: Gait Training 40 min, Therapeutic Activity 20 min, and Self care/Home management 30 min    01/01/2024

## 2024-01-01 NOTE — PT/OT/SLP PROGRESS
Occupational Therapy Inpatient Rehab Treatment    Name: Chichi Mckee  MRN: [id number]    Assessment:  Chichi Mckee is a 89 y.o. male admitted with a medical diagnosis of Stroke.  He presents with the following impairments/functional limitations:  weakness, impaired endurance, impaired self care skills, impaired functional mobility, impaired balance, gait instability, decreased upper extremity function, decreased lower extremity function, decreased safety awareness.    General Precautions: Standard, hearing impaired, fall     Orthopedic Precautions:N/A     Braces: N/A    Rehab Prognosis: Fair; patient would benefit from acute skilled OT services to address these deficits and reach maximum level of function.      History:     Past Medical History:   Diagnosis Date    Hypertension        No past surgical history on file.    Subjective   Chief Complaint: No complaints at this time  Patient/Family Comments/goals: To go home in 4 days    Vitals   Vitals:    01/01/24 1300   BP: (!) 162/84   Pulse: 87     Respiratory Status: Room air    Patients cultural, spiritual, Roman Catholic conflicts given the current situation: no       Objective:     Patient found up in chair with peripheral IV  upon OT entry to room.    Mobility   Patient completed:  Sit to Stand Transfer with moderate assistance with rolling walker  Stand to Sit Transfer with contact guard assistance with rolling walker  Toilet Transfer Step Transfer technique with minimum assistance with  rolling walker and bedside commode    Functional Mobility  FM in/out bathroom with RW and CGA for safety and steadying assistance. No loss of balance noted. IV pole in tow.    ADLs   Current Status   Toilet Transfer 3     Limiting Factors for ADLs: motor, endurance, balance, weakness, and safety awareness     Patient's four children (1 son + 3 daughters) present for scheduled family training. OT discussed pt's progress and current level of assistance with ADLs and functional  transfers, DME/AE needs and uses, home safety tips, and routines to incorporate into pt's daily living to improve RUE strength. Family training overall successful. All questions/concerns addressed at conclusion.    LifeStyle Change and Education:     Patient left up in chair with all lines intact, call button in reach, RN Elena and PT Charan notified, and family present.     Education provided: Roles and goals of OT, ADLs, transfer training, body mechanics, assistive device, wheelchair precautions, modified goals, sequencing, safety precautions, fall prevention, equipment recommendations, and home safety    Multidisciplinary Problems       Occupational Therapy Goals          Problem: Occupational Therapy    Goal Priority Disciplines Outcome Interventions   Occupational Therapy Goal     OT, PT/OT Ongoing, Progressing    Description: ADLs:  Pt to perform oral care tasks with set up while standing at sink  Pt to perform UB dressing with SPV.  Pt to perform LB dressing with Partial mod assist with AE as needed.   Pt to perform putting on/off footwear task with Partial mod with AE as needed.  Pt to perform toileting with SPV.  Pt to perform bathing with Partial mod assist with AE as needed.    Functional Transfers:  Pt to perform toilet transfers with incidental touching.  Pt to perform a tub transfer with incidental touching.    Balance, Strengthening, Endurance, Balance:  Pt to consistently demonstrate adherence to orthopedic precautions during all ADL's as instructed by OT.  Pt to demonstrate good dynamic standing balance as required to perform ADL's from standing level.  Pt to demonstrate good BUE strength during functional task   Pt to demonstrate consistent adherence to breathing control and energy conservation techniques as educated by OT.                        Time Tracking     OT Received On: 01/01/24  Time In 1300     Time Out 1330  Total Time 30 min  Therapy Time: OT Individual: 30  Missed Time:    Missed  Time Reason:      Billable Minutes: Self Care/Home Management 30 01/01/2024

## 2024-01-01 NOTE — PT/OT/SLP PROGRESS
Occupational Therapy Inpatient Rehab Treatment    Name: Chichi Mckee  MRN: [id number]    Assessment:  Chichi Mckee is a 89 y.o. male admitted with a medical diagnosis of Stroke.  He presents with the following impairments/functional limitations:  impaired endurance, weakness, impaired self care skills, impaired functional mobility, gait instability, impaired balance, impaired cognition, decreased upper extremity function, decreased lower extremity function, decreased safety awareness.    General Precautions: Standard, fall     Orthopedic Precautions:N/A     Braces: N/A    Rehab Prognosis: Good; patient would benefit from acute skilled OT services to address these deficits and reach maximum level of function.      History:     Past Medical History:   Diagnosis Date    Hypertension        No past surgical history on file.    Subjective     Orientation: Identifies self    Chief Complaint: No complaints at this time    Patient/Family Comments/goals: To go home    Vitals   Vitals:    01/01/24 1000 01/01/24 1100   BP: (!) 152/78 138/80   Pulse: 84 86     Respiratory Status: Room air    Patients cultural, spiritual, Evangelical conflicts given the current situation: no       Objective:     Patient found up in chair with peripheral IV  upon OT entry to room.    Mobility   Patient completed:  Sit to Stand Transfer with moderate assistance with rolling walker  Stand to Sit Transfer with minimum assistance with rolling walker  Toilet Transfer Step Transfer technique with moderate assistance with  rolling walker and bedside commode  Tub Transfer Step Transfer technique with moderate assistance with rolling walker and TTB    Functional Mobility  FM in/out bathroom x2 with RW and CGA. No loss of balance noted with each trial.    ADLs   Current Status   Toilet Transfer 3     Limiting Factors for ADLs: motor, endurance, balance, weakness, cognition, and safety awareness     Therapeutic Activities  OT issued a reacher to pt. Pt  used reacher in LUE, while weightbearing on R forearm, to grasp pegs from floor level and place into cones based on color of pegs. Pt required intermittent verbal cues to ensure he matches the pegs to the cones based on the color provided.    Therapeutic Exercise  Pt used RUE only to propel UE forward x3 minutes to improve RUE strength and motor control.    LifeStyle Change and Education:     Patient left up in chair with all lines intact and call button in reach.     Education provided: Roles and goals of OT, ADLs, transfer training, bed mobility, body mechanics, assistive device, wheelchair precautions, modified goals, sequencing, safety precautions, fall prevention, equipment recommendations, and home safety    Multidisciplinary Problems       Occupational Therapy Goals          Problem: Occupational Therapy    Goal Priority Disciplines Outcome Interventions   Occupational Therapy Goal     OT, PT/OT Ongoing, Progressing    Description: ADLs:  Pt to perform oral care tasks with set up while standing at sink  Pt to perform UB dressing with SPV.  Pt to perform LB dressing with Partial mod assist with AE as needed.   Pt to perform putting on/off footwear task with Partial mod with AE as needed.  Pt to perform toileting with SPV.  Pt to perform bathing with Partial mod assist with AE as needed.    Functional Transfers:  Pt to perform toilet transfers with incidental touching.  Pt to perform a tub transfer with incidental touching.    Balance, Strengthening, Endurance, Balance:  Pt to consistently demonstrate adherence to orthopedic precautions during all ADL's as instructed by OT.  Pt to demonstrate good dynamic standing balance as required to perform ADL's from standing level.  Pt to demonstrate good BUE strength during functional task   Pt to demonstrate consistent adherence to breathing control and energy conservation techniques as educated by OT.                        Time Tracking     OT Received On:  01/01/24  Time In 1000     Time Out 1100  Total Time 60 min  Therapy Time: OT Individual: 60  Missed Time:    Missed Time Reason:      Billable Minutes: Self Care/Home Management 30 and Therapeutic Activity 30    01/01/2024

## 2024-01-01 NOTE — PROGRESS NOTES
[facility] ([facility])  Rehab Progress Note    Patient Name: Chichi Mckee  MRN: [id number]  Age: 89 y.o. Sex: male  : 1929  Hospital Length of Stay: 11 days  Date of Service: 2024   Chief Complaint: Ischemic CVA to left posterior frontal and temporal lobes with right sided weakness     Subjective:     Basic Information  Admit Information: 89-year-old  male presented to [facility] ED on 2023 complaining of slurred speech and right upper extremity weakness and numbness starting at 11:00 a.m..  PMH significant for  HTN, HLD, PAD, aortic stenosis, history of bladder mass, and BPH.  Workup significant for CVA.  Code fast initiated.  CT head negative.  T and K initiated at 12:48 p.m..  Admitted to ICU.  CTA head and neck significant for left vertebral artery occluded proximally.  LVEF  55-60% with negative bubble study.  Repeat CT head negative for intracranial hemorrhage.  MRI brain with contrast significant for patchy acute ischemic changes in the left posterior frontal and temporal lobes.  Diagnostic angiogram planned with Interventional Neurology on , but family declined due to advanced age.  Neurology recommended daily aspirin 81 mg daily and Lipitor 20 mg daily.  Tolerated transfer to [facility]  inpatient rehab unit on  without incident.   Today's Information: No acute events overnight.  Resting in bed.  Reports good sleep and appetite.  Last BM .  Vital signs at goal with no recorded fevers.  CBC unremarkable.  BUN and creatinine 27.6/1.32-trending up.  No new imaging today.    Review of patient's allergies indicates:   Allergen Reactions    Hydrocodone-acetaminophen         Current Facility-Administered Medications:     acetaminophen tablet 650 mg, 650 mg, Oral, Q4H PRN, Nitin, Nimesh A, FNP, 650 mg at 23 0750    ALPRAZolam tablet 0.25 mg, 0.25 mg, Oral, TID PRN, Nitin, Nimesh A, FNP    amLODIPine tablet 10 mg, 10 mg, Oral,  "Daily, Seema Colon, FNP, 10 mg at 12/31/23 0755    aspirin EC tablet 81 mg, 81 mg, Oral, Daily, Nitin, Nimesh A, FNP, 81 mg at 12/31/23 0755    atorvastatin tablet 20 mg, 20 mg, Oral, Daily, Nitin, Nimesh A, FNP, 20 mg at 12/31/23 0755    benzonatate capsule 100 mg, 100 mg, Oral, TID PRN, Nitin, Nimesh A, FNP    bisacodyL suppository 10 mg, 10 mg, Rectal, Daily PRN, Nitin, Nimesh A, FNP    docusate sodium capsule 100 mg, 100 mg, Oral, BID, Nitin, Nimesh A, FNP, 100 mg at 12/31/23 [facility]5    finasteride tablet 5 mg, 5 mg, Oral, Daily, Nitin, Nimesh A, FNP, 5 mg at 12/31/23 0755    hydrALAZINE injection 10 mg, 10 mg, Intravenous, Q4H PRN, Nitin, Nimesh A, FNP    hydrOXYzine pamoate capsule 50 mg, 50 mg, Oral, Nightly PRN, Nitin, Nimesh A, FNP    labetalol 20 mg/4 mL (5 mg/mL) IV syring, 10 mg, Intravenous, Q4H PRN, Nitin, Nimesh A, FNP, 10 mg at 12/26/23 0623    metoprolol injection 10 mg, 10 mg, Intravenous, Q2H PRN, Nitin, Nimesh A, FNP    nitroGLYCERIN SL tablet 0.4 mg, 0.4 mg, Sublingual, Q5 Min PRN, Nitin, Nimesh A, FNP    ondansetron disintegrating tablet 4 mg, 4 mg, Oral, Q6H PRN, Nitin, Nimesh A, FNP    ondansetron disintegrating tablet 8 mg, 8 mg, Oral, Q6H PRN, Nitin, Nimesh A, FNP    polyethylene glycol packet 17 g, 17 g, Oral, BID, Seema Colon, FNP, 17 g at 12/31/23 0755    promethazine tablet 25 mg, 25 mg, Oral, Q6H PRN, Nitin, Nimesh A, FNP    tamsulosin 24 hr capsule 0.4 mg, 0.4 mg, Oral, QHS, Nimesh Taveras FNP, 0.4 mg at 12/31/23 2058    traMADoL tablet 50 mg, 50 mg, Oral, Q8H PRN, Nimesh Taveras FNP     Review of Systems   Complete 12-point review of symptoms negative except for what's mentioned in HPI     Objective:     BP (!) 154/80   Pulse 75   Temp 97.9 °F (36.6 °C) (Oral)   Resp 18   Ht 5' 10" (1.778 m)   Wt 79.3 kg (174 lb 13.2 oz)   SpO2 95%   BMI 25.08 kg/m²        Physical Exam  Vitals " reviewed.   HENT:      Head:      Comments: [place]  Eyes:      Pupils: Pupils are equal, round, and reactive to light.   Cardiovascular:      Rate and Rhythm: Normal rate and regular rhythm.      Comments: Heart sounds: Murmur heard.   Systolic murmur is present with a grade of 3/6.   Pulmonary:      Effort: Pulmonary effort is normal.      Breath sounds: Normal breath sounds.   Abdominal:      General: Bowel sounds are normal.   Musculoskeletal:         General: Normal range of motion.      Comments: Right-sided weakness   Skin:     General: Skin is warm.   Neurological:      General: No focal deficit present.      Mental Status: He is alert and oriented to person, place, and time.      Motor: Weakness present.   Psychiatric:         Mood and Affect: Mood normal.          Lines/Drains/Airways       Peripheral Intravenous Line  Duration                  Peripheral IV - Single Lumen 12/25/23 2000 20 G Distal;Left;Posterior Forearm 6 days                    Labs  Recent Results (from the past 24 hour(s))   Prealbumin    Collection Time: 01/01/24  6:21 AM   Result Value Ref Range    Prealbumin 22.4 16.0 - 42.0 mg/dL   Comprehensive Metabolic Panel    Collection Time: 01/01/24  6:21 AM   Result Value Ref Range    Sodium Level 139 132 - 146 mmol/L    Potassium Level 4.1 3.5 - 5.1 mmol/L    Chloride 110 98 - 111 mmol/L    Carbon Dioxide 22 (L) 23 - 31 mmol/L    Glucose Level 115 75 - 121 mg/dL    Blood Urea Nitrogen 27.6 (H) 8.4 - 25.7 mg/dL    Creatinine 1.32 (H) 0.73 - 1.18 mg/dL    Calcium Level Total 10.6 (H) 8.8 - 10.0 mg/dL    Protein Total 7.1 5.8 - 7.6 gm/dL    Albumin Level 3.2 (L) 3.4 - 4.8 g/dL    Globulin 3.9 (H) 2.4 - 3.5 gm/dL    Albumin/Globulin Ratio 0.8 (L) 1.1 - 2.0 ratio    Bilirubin Total 0.3 <=1.5 mg/dL    Alkaline Phosphatase 88 40 - 150 unit/L    Alanine Aminotransferase 48 0 - 55 unit/L    Aspartate Aminotransferase 28 5 - 34 unit/L    eGFR 50 mls/min/1.73/m2   Magnesium    Collection Time: 01/01/24   6:21 AM   Result Value Ref Range    Magnesium Level 2.20 1.60 - 2.60 mg/dL   Phosphorus    Collection Time: 01/01/24  6:21 AM   Result Value Ref Range    Phosphorus Level 2.7 2.3 - 4.7 mg/dL   CBC with Differential    Collection Time: 01/01/24  6:21 AM   Result Value Ref Range    WBC 6.36 4.50 - 11.50 x10(3)/mcL    RBC 5.09 4.70 - 6.10 x10(6)/mcL    Hgb 14.4 14.0 - 18.0 g/dL    Hct 45.5 42.0 - 52.0 %    MCV 89.4 80.0 - 94.0 fL    MCH 28.3 27.0 - 31.0 pg    MCHC 31.6 (L) 33.0 - 36.0 g/dL    RDW 13.3 11.5 - 17.0 %    Platelet 347 130 - 400 x10(3)/mcL    MPV 10.5 (H) 7.4 - 10.4 fL    Neut % 66.2 %    Lymph % 17.5 %    Mono % 9.7 %    Eos % 5.3 %    Basophil % 0.8 %    Lymph # 1.11 0.6 - 4.6 x10(3)/mcL    Neut # 4.21 2.1 - 9.2 x10(3)/mcL    Mono # 0.62 0.1 - 1.3 x10(3)/mcL    Eos # 0.34 0 - 0.9 x10(3)/mcL    Baso # 0.05 <=0.2 x10(3)/mcL    IG# 0.03 0 - 0.04 x10(3)/mcL    IG% 0.5 %    NRBC% 0.0 %     Radiology  MRI brain without contrast on 12/18/2023, IMPRESSION: Patchy acute ischemic changes in the left posterior frontal and temporal lobes. Moderate chronic microvascular ischemic changes.  Assessment/Plan:     89 y.o. AAM admitted on 12/21/2023     Ischemic CVA   - to left posterior frontal and temporal lobes with right sided weakness  - continue                Aspirin 81 mg daily                Lipitor 20 mg daily   - defer to physiatry for rehab and pain management  - PT/OT/RT/ST following     PAD  - stable   - continue                Aspirin 81 mg daily                Lipitor 20 mg daily      HLD  -  FLP outpatient with PCP  - continue                Lipitor 20 mg daily       VHD  -  aortic stenosis  -  to follow-up with cardiology outpatient     HTN  - BP improved  - continue                Norvasc 10 mg daily (initiated 12/23)                Hydralazine 10 mg every 2 hours as needed for BP > 160/90                Labetalol 10 mg every 2 hours as needed for BP > 160/90  - low sodium diet     Constipation  - stable    - continue  Colace 100 mg BID   Miralax 17 gm BID (initiated 12/27)     BPH  - stable  - continue                Finasteride 5 mg daily                 Flomax 0.4 mg at bedtime    BRIAN  - current  - initiate   NS 75 mL/HR x1 L     VTE Prophylaxis:  SCDs  COVID-19 testing:  unknown  COVID-19 vaccination status:  vaccinated     POA: No  Living will: No  Contacts: Awa Mckee (dtr) [phone]       CODE STATUS: Full Code  Internal Medicine (attending): Alvarez Zayas MD  Physiatry (consulting):  Wilder Pham MD     OUTPATIENT PROVIDERS    PCP:  [facility]    Neurology:  Tyron Rahman MD     DISPOSITION:  Sleep hygiene, bowel maintenance, and appetite at goal.  Last BM 12/31.  Vital signs at goal with no recorded fevers.  Cbc unremarkable.  Renal indices trending up.  No new imaging today.  Initiate normal saline at 75 mL/HR x1 L.  Repeat lab work in the morning.  Monitor closely.  Notify of acute changes.  Staffing to be completed in the morning.    Staffing 12/26: Incontinent of bowel and bladder. PT: contact guard for sit to stands. Walking 90 -110 feet. OT: Overall max to mod assist. Safety awareness. Severe hearing impairment. Will need 24/7 supervision. Projected discharge 1/5.

## 2024-01-02 LAB
ANION GAP SERPL CALC-SCNC: 9 MEQ/L
BUN SERPL-MCNC: 28.6 MG/DL (ref 8.4–25.7)
CALCIUM SERPL-MCNC: 10.4 MG/DL (ref 8.8–10)
CHLORIDE SERPL-SCNC: 112 MMOL/L (ref 98–111)
CO2 SERPL-SCNC: 22 MMOL/L (ref 23–31)
CREAT SERPL-MCNC: 1.42 MG/DL (ref 0.73–1.18)
CREAT/UREA NIT SERPL: 20
GFR SERPLBLD CREATININE-BSD FMLA CKD-EPI: 46 MLS/MIN/1.73/M2
GLUCOSE SERPL-MCNC: 123 MG/DL (ref 75–121)
POTASSIUM SERPL-SCNC: 4.4 MMOL/L (ref 3.5–5.1)
SODIUM SERPL-SCNC: 143 MMOL/L (ref 132–146)

## 2024-01-02 PROCEDURE — 80048 BASIC METABOLIC PNL TOTAL CA: CPT | Performed by: NURSE PRACTITIONER

## 2024-01-02 PROCEDURE — 94761 N-INVAS EAR/PLS OXIMETRY MLT: CPT

## 2024-01-02 PROCEDURE — 25000003 PHARM REV CODE 250: Performed by: NURSE PRACTITIONER

## 2024-01-02 PROCEDURE — 99900035 HC TECH TIME PER 15 MIN (STAT)

## 2024-01-02 PROCEDURE — 99233 SBSQ HOSP IP/OBS HIGH 50: CPT | Mod: ,,, | Performed by: NURSE PRACTITIONER

## 2024-01-02 PROCEDURE — 11800000 HC REHAB PRIVATE ROOM

## 2024-01-02 PROCEDURE — 97110 THERAPEUTIC EXERCISES: CPT

## 2024-01-02 PROCEDURE — 97110 THERAPEUTIC EXERCISES: CPT | Mod: CQ

## 2024-01-02 PROCEDURE — 97530 THERAPEUTIC ACTIVITIES: CPT | Mod: CQ

## 2024-01-02 PROCEDURE — 97535 SELF CARE MNGMENT TRAINING: CPT

## 2024-01-02 PROCEDURE — 97116 GAIT TRAINING THERAPY: CPT | Mod: CQ

## 2024-01-02 PROCEDURE — 97530 THERAPEUTIC ACTIVITIES: CPT

## 2024-01-02 RX ORDER — ATORVASTATIN CALCIUM 20 MG/1
20 TABLET, FILM COATED ORAL DAILY
Qty: 90 TABLET | Refills: 0 | Status: SHIPPED | OUTPATIENT
Start: 2024-01-02 | End: 2024-04-01

## 2024-01-02 RX ORDER — SODIUM CHLORIDE 9 MG/ML
INJECTION, SOLUTION INTRAVENOUS CONTINUOUS
Status: ACTIVE | OUTPATIENT
Start: 2024-01-02 | End: 2024-01-02

## 2024-01-02 RX ORDER — ASPIRIN 81 MG/1
81 TABLET ORAL DAILY
Qty: 90 TABLET | Refills: 0 | Status: SHIPPED | OUTPATIENT
Start: 2024-01-02 | End: 2024-04-01

## 2024-01-02 RX ORDER — AMLODIPINE BESYLATE 10 MG/1
10 TABLET ORAL DAILY
Qty: 30 TABLET | Refills: 2 | Status: SHIPPED | OUTPATIENT
Start: 2024-01-02 | End: 2024-04-01

## 2024-01-02 RX ORDER — FINASTERIDE 5 MG/1
5 TABLET, FILM COATED ORAL DAILY
Qty: 30 TABLET | Refills: 2 | Status: SHIPPED | OUTPATIENT
Start: 2024-01-02 | End: 2024-04-01

## 2024-01-02 RX ORDER — DOCUSATE SODIUM 100 MG/1
100 CAPSULE, LIQUID FILLED ORAL 2 TIMES DAILY
Qty: 20 CAPSULE | Refills: 0 | Status: SHIPPED | OUTPATIENT
Start: 2024-01-02 | End: 2024-01-12

## 2024-01-02 RX ORDER — CARVEDILOL 3.12 MG/1
3.12 TABLET ORAL 2 TIMES DAILY
Status: DISCONTINUED | OUTPATIENT
Start: 2024-01-02 | End: 2024-01-03

## 2024-01-02 RX ORDER — CARVEDILOL 3.12 MG/1
3.12 TABLET ORAL 2 TIMES DAILY
Qty: 60 TABLET | Refills: 2 | Status: SHIPPED | OUTPATIENT
Start: 2024-01-02 | End: 2024-01-05 | Stop reason: HOSPADM

## 2024-01-02 RX ORDER — POLYETHYLENE GLYCOL 3350 17 G/17G
17 POWDER, FOR SOLUTION ORAL 2 TIMES DAILY
Qty: 20 EACH | Refills: 0 | Status: SHIPPED | OUTPATIENT
Start: 2024-01-02 | End: 2024-01-12

## 2024-01-02 RX ORDER — TAMSULOSIN HYDROCHLORIDE 0.4 MG/1
0.4 CAPSULE ORAL NIGHTLY
Qty: 30 CAPSULE | Refills: 2 | Status: SHIPPED | OUTPATIENT
Start: 2024-01-02 | End: 2024-04-01

## 2024-01-02 RX ADMIN — DOCUSATE SODIUM 100 MG: 100 CAPSULE, LIQUID FILLED ORAL at 07:01

## 2024-01-02 RX ADMIN — TAMSULOSIN HYDROCHLORIDE 0.4 MG: 0.4 CAPSULE ORAL at 08:01

## 2024-01-02 RX ADMIN — SODIUM CHLORIDE: 9 INJECTION, SOLUTION INTRAVENOUS at 08:01

## 2024-01-02 RX ADMIN — FINASTERIDE 5 MG: 5 TABLET, FILM COATED ORAL at 07:01

## 2024-01-02 RX ADMIN — AMLODIPINE BESYLATE 10 MG: 5 TABLET ORAL at 07:01

## 2024-01-02 RX ADMIN — CARVEDILOL 3.12 MG: 3.12 TABLET, FILM COATED ORAL at 08:01

## 2024-01-02 RX ADMIN — DOCUSATE SODIUM 100 MG: 100 CAPSULE, LIQUID FILLED ORAL at 08:01

## 2024-01-02 RX ADMIN — POLYETHYLENE GLYCOL 3350 17 G: 17 POWDER, FOR SOLUTION ORAL at 08:01

## 2024-01-02 RX ADMIN — POLYETHYLENE GLYCOL 3350 17 G: 17 POWDER, FOR SOLUTION ORAL at 07:01

## 2024-01-02 RX ADMIN — ASPIRIN 81 MG: 81 TABLET, COATED ORAL at 07:01

## 2024-01-02 RX ADMIN — ATORVASTATIN CALCIUM 20 MG: 10 TABLET, FILM COATED ORAL at 07:01

## 2024-01-02 NOTE — PT/OT/SLP PROGRESS
Pt was not seen for Recreational Therapy due to schedule conflict    Pt progress, plan of care and discharge plans were discussed during staffing at 0930

## 2024-01-02 NOTE — PT/OT/SLP PROGRESS
"Physical Therapy Inpatient Rehab Treatment    Patient Name:  Chichi Mckee   MRN:  [id number]    Recommendations:     Discharge Recommendations:  Low Intensity Therapy   Discharge Equipment Recommendations:     Barriers to discharge: Impaired functional mobility     Assessment:     Chichi Mckee is a 89 y.o. male admitted with a medical diagnosis of Stroke.  He presents with the following impairments/functional limitations:  weakness, impaired endurance, impaired self care skills, impaired functional mobility, impaired balance, gait instability, decreased upper extremity function, decreased lower extremity function, decreased safety awareness .    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 Pt. Has a past medical history of HTN, HLD, PAD, aortic stenosis, bladder mass, BPH, and very [place]    General Precautions: Standard, hearing impaired     Orthopedic Precautions:N/A     Braces: N/A    Rehab Prognosis: Fair; patient would benefit from acute skilled PT services to address these deficits and reach maximum level of function.      History:     Past Medical History:   Diagnosis Date    Hypertension        No past surgical history on file.    Subjective     Patient comments: "I dont have to pee"    Respiratory Status: Room air    Patients cultural, spiritual, Adventism conflicts given the current situation:      Objective:     Communicated with rn prior to session.  Patient found supine with chair check, peripheral IV (telesitter)  upon PT entry to room.      Vitals   Vitals at Rest  /73   HR 76   O2 Sat    Pain      Vitals With Activity  BP (!) 143/75   HR 80   O2 Sat     Pain         Functional Mobility:        Current   Status  Discharge   Goal   Functional Area: Care Score:    Roll Left and Right   Independent   Sit to Lying   Independent   Lying to Sitting on Side of Bed 4  Vc for safety Independent   Sit to Stand 4  W/rw and vc for safety " Set-up/clean-up   Chair/Bed-to-Chair Transfer 4  W/rw and vc for safety Set-up/clean-up   Car Transfer   Set-up/clean-up   Walk 10 Feet 4 Set-up/clean-up   Walk 50 Feet with Two Turns 4 Set-up/clean-up   Walk 150 Feet 4  Pt amb w/rw and cga for safety. 200ft  Set-up/clean-up   Walk 10 Feet Uneven Surface   Supervision or touching assistance   1 Step (Curb)   Supervision or touching assistance   4 Steps 3  Pt preformed 4 steps w/BHR. Pt required increase vc for proper technique and min assist for balance.  Not applicable   12 Steps     Not applicable   Picking Up Object   Independent   Wheel 50 Feet with Two Turns   Not applicable   Wheel 150 Feet   Not applicable       Therapeutic Activities and Exercises:  Patient performed 2 set(s) of 15 repetitions of the following seated exercises: ankle pumps, long arc quads, marches, hip abduction, hip adduction, and knee flexion for bilateral LE. Patient required skilled PT for instruction of exercises and appropriate cues to perform exercises safely and appropriately.  Pt preformed sit<> stand multiple times while therapist assist changing soiled pull up.   Activity Tolerance: Good    Patient left up in chair with all lines intact, call button in reach, and chair alarm on.    Education provided: transfer training, gait training, and strengthening exercises    Expected compliance: High compliance    Plan:     During this hospitalization, patient to be seen 5 x/week (5-7 x week) to address the identified rehab impairments via gait training, therapeutic activities, therapeutic exercises, neuromuscular re-education, wheelchair management/training and progress toward the following goals:    GOALS:   Multidisciplinary Problems       Physical Therapy Goals          Problem: Physical Therapy    Goal Priority Disciplines Outcome Goal Variances Interventions   Physical Therapy Goal     PT, PT/OT Ongoing, Progressing     Description: Bed Mobility:  Roll left and right with  setup/clean-up assist.  Sit to supine transfer with setup/clean-up assist.  Supine to sit transfer with setup/clean-up assist.    Transfers:  Sit to stand transfer with setup/clean-up assist using RW.  Bed to chair transfer with setup/clean-up assist Stand Step  using RW.  Car transfer with supervision/touching assist using RW.   an object from the ground in standing position with supervision/touching assist using RW.    Mobility:  Ambulate 150 feet with supervision/touching assist using RW.  Ambulate 15 feet on uneven surfaces/ramps with supervision/touching assist using RW.  Pt ascended/descended a 4 inch curb with supervision/touching assist using RW.  Ascend/descend 4 stairs with supervision/touching assist using bilateral handrails.                        Plan of Care Expires:  01/03/24  PT Next Visit Date: 01/03/24  Plan of Care reviewed with: patient, family    Additional Information:         Time Tracking:     Therapy Time  PT Received On: 01/02/24  PT Start Time: 0930  PT Stop Time: 1100  PT Total Time (min): 90 min   PT Individual: 90  Missed Time:    Time Missed due to:      Billable Minutes: Gait Training 30, Therapeutic Activity 30, and Therapeutic Exercise 30    01/02/2024

## 2024-01-02 NOTE — PROGRESS NOTES
Dos 1/2/24  Patient seen and evaluated in OT today  Continues to participate and make progress toward goals  Working on ADL's and IADL's  Discussed with patient and OT  Reviewed chart and discussed with nursing, Dr Zayas's primary medicine team, as well as Anyi Dockery, my NP  Agree with present POC  Subjective  HPI:  89 year old BM with a PMH of HTN, HLD, PAD, aortic stenosis, bladder mass, [place], and BPH presented to the ED as a Code Fast on 12/16/2023 with complaints of slurred speech and RUE weakness and numbness starting at 11:00 a.m. that morning.  CT head was negative.  Lab work revealed BRIAN.  He was given TNK at 12:48 p.m. and admitted to ICU. 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 There is a long segment area of multifocal narrowing in the basilar artery which could represent vasospasm or areas of multifocal narrowing.  60-70% stenosis in the proximal left internal carotid artery secondary to plaque.  No large vessel occlusion is seen.  Prelim echo showed EF 55-60%, negative bubble study.  24 hr repeat CT head showed no acute intracranial hemorrhage.  MRI brain without contrast showed patchy acute ischemic changes in the left posterior frontal and temporal lobes.  Moderate chronic microvascular ischemic changes. Patient kept in ICU additional 24 hours for frequent neuro checks. Patient originally going to undergo DSA, but family decided against it, due to patient's age. VS have remained stable, BRIAN resolved.  The patient was deemed suitable for transfer to the floor. Care was transferred to hospital medicine for further management. On 12/20, BP ranging 139/69- 187/93, HR ranging 54-81 bpm. Amlodipine increased to 10mg due to elevated BP. Started Atorvastatin 20mg daily per neuro. PT/OT evals completed with deficits noted with recommendation for high intensity therapy needed. Speech swallowing eval showed no signs of aspiration so recommended regular diet. Patient is  AAOx3; on standard aspiration precautions.   Participating with therapy. Functional status includes moderate assist needed for transfers using RW, moderate assist for balance while standing for grooming task of brushing teeth, CGA for walking 230 ft with RW, minimal assist for bed mobility, moderate assist for lower body dressing, and moderate assist for toileting. Patient was evaluated, accepted, and admitted to inpatient rehab to improve functional status. Transferred to [facility] on 12/21 without incident.     1/2: Seen in patient room with CNAs, seated in WC. Patient continues to try to get up unassisted. Telesitter in place. Patient extremely [place]. Tolerating therapy. VSSAF with noted HTN this morning. IM following.           Review of Systems   Depression/Anxiety: no complaints     ALPRAZolam tablet 0.25 mg TID PRN Anxiety  Pain: denies     acetaminophen tablet 650 mg q4h PRN mild pain  traMADoL tablet 50 mg q8h PRN mod/severe pain  Bowels/Bladder: last BM 1/1; remains Incontinent  -Initiate toileting schedule q2h  Appetite: good     Sleep: good                Physical Exam  General: well-developed, well-nourished, in no acute distress, [place]  Respiratory: equal chest rise, no SOB, no audible wheeze  Cardiovascular: regular rate and rhythm, no edema  Gastrointestinal: soft, non-tender, non-distended   Musculoskeletal: right sided weakness  Integumentary: no rashes or skin lesions present  Neurologic: cranial nerves intact, right sided weakness  *MD performed and documented physical examination             Labs:   Latest Reference Range & Units 01/01/24 06:21 01/02/24 05:36   WBC 4.50 - 11.50 x10(3)/mcL 6.36    RBC 4.70 - 6.10 x10(6)/mcL 5.09    Hemoglobin 14.0 - 18.0 g/dL 14.4    Hematocrit 42.0 - 52.0 % 45.5    MCV 80.0 - 94.0 fL 89.4    MCH 27.0 - 31.0 pg 28.3    MCHC 33.0 - 36.0 g/dL 31.6 (L)    RDW 11.5 - 17.0 % 13.3    Platelet Count 130 - 400 x10(3)/mcL 347    MPV 7.4 - 10.4 fL 10.5 (H)    Neut % % 66.2     LYMPH % % 17.5    Mono % % 9.7    Eosinophil % % 5.3    Basophil % % 0.8    Immature Granulocytes % 0.5    Neut # 2.1 - 9.2 x10(3)/mcL 4.21    Lymph # 0.6 - 4.6 x10(3)/mcL 1.11    Mono # 0.1 - 1.3 x10(3)/mcL 0.62    Eos # 0 - 0.9 x10(3)/mcL 0.34    Baso # <=0.2 x10(3)/mcL 0.05    Immature Grans (Abs) 0 - 0.04 x10(3)/mcL 0.03    nRBC % 0.0    Sodium 132 - 146 mmol/L 139 143   Potassium 3.5 - 5.1 mmol/L 4.1 4.4   Chloride 98 - 111 mmol/L 110 112 (H)   CO2 23 - 31 mmol/L 22 (L) 22 (L)   Anion Gap mEq/L  9.0   BUN 8.4 - 25.7 mg/dL 27.6 (H) 28.6 (H)   Creatinine 0.73 - 1.18 mg/dL 1.32 (H) 1.42 (H)   BUN/CREAT RATIO   20   eGFR mls/min/1.73/m2 50 46   Glucose 75 - 121 mg/dL 115 123 (H)   Calcium 8.8 - 10.0 mg/dL 10.6 (H) 10.4 (H)   Phosphorus Level 2.3 - 4.7 mg/dL 2.7    Magnesium  1.60 - 2.60 mg/dL 2.20    ALP 40 - 150 unit/L 88    PROTEIN TOTAL 5.8 - 7.6 gm/dL 7.1    Albumin 3.4 - 4.8 g/dL 3.2 (L)    Albumin/Globulin Ratio 1.1 - 2.0 ratio 0.8 (L)    Prealbumin 16.0 - 42.0 mg/dL 22.4    BILIRUBIN TOTAL <=1.5 mg/dL 0.3    AST 5 - 34 unit/L 28    ALT 0 - 55 unit/L 48    Globulin, Total 2.4 - 3.5 gm/dL 3.9 (H)    (L): Data is abnormally low  (H): Data is abnormally high          Assessment/Plan  Hospital   Stroke   Right sided weakness     Non-Hospital   Bladder mass   Essential hypertension   HLD (hyperlipidemia)   VHD (valvular heart disease)   BPH (benign prostatic hyperplasia)       Wounds: none noted  Precautions: fall risk  Bracing: RW  Swallowing: Regular Diet  Function: Tolerating therapy. Continue PT/OT  VTE Prophylaxis: SCDs  Code Status: FULL CODE   Discharge:Lives with his spouse and daughter in [place] in a single-story home with a ramp with bilateral rails to enter the residence. Completed 3rd grade. He was in the [employer]. 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 Per the patients son, the family is nearby. Their daughter lives with them. The family takes care of the  household and yard work and manages finances and medications. They will have assistance from the family after the rehab stay. Children: (4).  Date 1/5 Friday.               Vanessa Dockery NP, conducted additional independent physical examination and assisted with medical documentation.

## 2024-01-02 NOTE — PT/OT/SLP PROGRESS
"Occupational Therapy Inpatient Rehab Treatment    Name: Chichi Mckee  MRN: [id number]    Assessment:  Chichi Mckee is a 89 y.o. male admitted with a medical diagnosis of Stroke.  He presents with the following impairments/functional limitations:  weakness, impaired endurance, impaired self care skills, impaired functional mobility, impaired balance, decreased upper extremity function, decreased safety awareness, decreased ROM, impaired coordination, impaired fine motor Pt. Is a RED STAR Fall Risk..    General Precautions: Standard, fall, hearing impaired     Orthopedic Precautions:N/A     Braces: N/A    Rehab Prognosis: Good; patient would benefit from acute skilled OT services to address these deficits and reach maximum level of function.      History:     Past Medical History:   Diagnosis Date    Hypertension        No past surgical history on file.    Subjective     Orientation: Identifies self    Chief Complaint: No c/o     Patient/Family Comments/goals: "Get stronger"    Vitals   Vitals at Rest  /74   HR 73   O2 Sat    Pain 0/10     Vitals With Activity  /77   HR 75   O2 Sat    Pain 0/10     Respiratory Status: Room air    Patients cultural, spiritual, Ep[employer]alian conflicts given the current situation: no       Objective:     Patient found up in chair with chair check, peripheral IV (telesitter)  upon OT entry to room.    Mobility   Patient completed:  Rolling/Turning to Right with moderate assistance  Sit to Supine with minimum assistance  Sit to Stand Transfer with minimum assistance with rolling walker  Stand to Sit Transfer with contact guard assistance with rolling walker    Functional Mobility  Pt. Performed sit <>stand 7 x's during Tx; maintained static standing during most of Tx     ADLs   Current Status   Eating     Oral Hygiene     Shower, Bathe Self     Upper Body Dressing     Lower Body Dressing     Toileting Hygiene     Toilet Transfer     Putting On, Taking Off Footwear 2 c SH to " doff     Limiting Factors for ADLs: motor, limited ROM, weakness, coordination, and Hearing      Therapeutic Activities  Pt. In standing performed R UE AROM/FM c small clothespins on plastic tray c CIMT to encourage R hand work as Pt. Defers to L. Pt. Rest break, Pt. Performed in hand manipulation c L pegs and required B hands to push pegs into foam pad after retrieval from bowl. Pt. Was able to remove all pegs c R hand only but required gentle support to elevate UE high enough to return to bowl. Rest break, Pt. In standing(4 min)  then sitting performed AAROM to R UE for large graded pinch clips onto vertical and horizontal dowels.     Therapeutic Exercise  Pt. In standing performed 5 min forward then 5 min back B UE AROM on UBE at 1.5 giles. Pt. C 3 # dowel performed 20 reps B UE AAROM c occasional gentle support under R elbow to fully extend. Pt. Tolerated well. Pt. Maintained good sitting posture away from backrest of w/c for seated There Ex.      Additional Treatments: Transfer c ~ 5 steps return to bed. Pt. Declined bathroom offers.     LifeStyle Change and Education:             Patient left right sidelying with all lines intact, call button in reach, bed alarm on, and telesitter .     Education provided: transfer training, body mechanics, assistive device, and sequencing    Multidisciplinary Problems       Occupational Therapy Goals          Problem: Occupational Therapy    Goal Priority Disciplines Outcome Interventions   Occupational Therapy Goal     OT, PT/OT Ongoing, Progressing    Description: ADLs:  Pt to perform oral care tasks with set up while standing at sink  Pt to perform UB dressing with SPV.  Pt to perform LB dressing with Partial mod assist with AE as needed.   Pt to perform putting on/off footwear task with Partial mod with AE as needed.  Pt to perform toileting with SPV.  Pt to perform bathing with Partial mod assist with AE as needed.    Functional Transfers:  Pt to perform toilet transfers  with incidental touching.  Pt to perform a tub transfer with incidental touching.    Balance, Strengthening, Endurance, Balance:  Pt to consistently demonstrate adherence to orthopedic precautions during all ADL's as instructed by OT.  Pt to demonstrate good dynamic standing balance as required to perform ADL's from standing level.  Pt to demonstrate good BUE strength during functional task   Pt to demonstrate consistent adherence to breathing control and energy conservation techniques as educated by OT.                        Time Tracking     OT Received On: 01/02/24  Time In 1300     Time Out 1430  Total Time 90 min  Therapy Time: OT Individual: 90  Missed Time:    Missed Time Reason:      Billable Minutes: Self Care/Home Management 15, Therapeutic Activity 45, and Therapeutic Exercise 30    01/02/2024

## 2024-01-02 NOTE — PLAN OF CARE
Received email from son Sander that the family found the RW and BSC at the home.    He will reach out today re: HH choice.    Family training session was done yesterday.    Requested that ANJALI Pedersen, [facility] so [facility] will have time to be [facility] for pt's discharge planned 1/5.

## 2024-01-02 NOTE — PROGRESS NOTES
[facility] ([facility])  Rehab Progress Note    Patient Name: Chichi Mckee  MRN: [id number]  Age: 89 y.o. 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  PMH significant for  HTN, HLD, PAD, aortic stenosis, history of bladder mass, and BPH.  Workup significant for CVA.  Code fast initiated.  CT head negative.  T and K initiated at 12:48 p.m..  Admitted to ICU.  CTA head and neck significant for left vertebral artery occluded proximally.  LVEF  55-60% with negative bubble study.  Repeat CT head negative for intracranial hemorrhage.  MRI brain with contrast significant for patchy acute ischemic changes in the left posterior frontal and temporal lobes.  Diagnostic angiogram planned with Interventional Neurology on , but family declined due to advanced age.  Neurology recommended daily aspirin 81 mg daily and Lipitor 20 mg daily.  Tolerated transfer to [facility]  inpatient rehab unit on  without incident.   Today's Information: No acute events overnight.  Resting comfortably in bed.  Staff reports good sleep.  Appetite is fair, but meeting needs with boost.  Last BM .  Doing well in therapy.  Systolic BP 1 50s.  Renal indices trending up slightly.  No new imaging today.    Review of patient's allergies indicates:   Allergen Reactions    Hydrocodone-acetaminophen         Current Facility-Administered Medications:     0.9%  NaCl infusion, , Intravenous, Continuous, Nitin, Nimesh A, FNP    acetaminophen tablet 650 mg, 650 mg, Oral, Q4H PRN, Nitin, Nimesh A, FNP, 650 mg at 23 0750    ALPRAZolam tablet  0.25 mg, 0.25 mg, Oral, TID PRN, Nitin, Nimesh A, FNP    amLODIPine tablet 10 mg, 10 mg, Oral, Daily, Seema Colon, FNP, 10 mg at 01/02/24 0722    aspirin EC tablet 81 mg, 81 mg, Oral, Daily, Nitin, Nimesh A, FNP, 81 mg at 01/02/24 0721    atorvastatin tablet 20 mg, 20 mg, Oral, Daily, Nitni, Nimesh A, FNP, 20 mg at 01/02/24 0722    benzonatate capsule 100 mg, 100 mg, Oral, TID PRN, Nitin, Nimesh A, FNP    bisacodyL suppository 10 mg, 10 mg, Rectal, Daily PRN, Nitin, Nimesh A, FNP    carvediloL tablet 3.125 mg, 3.125 mg, Oral, BID, Nitin, Nimesh A, FNP    docusate sodium capsule 100 mg, 100 mg, Oral, BID, Nitin, Nimesh A, FNP, 100 mg at 01/02/24 0721    finasteride tablet 5 mg, 5 mg, Oral, Daily, Nitin, Nimesh A, FNP, 5 mg at 01/02/24 0750    hydrALAZINE injection 10 mg, 10 mg, Intravenous, Q4H PRN, Nitin, Nimesh A, FNP    hydrOXYzine pamoate capsule 50 mg, 50 mg, Oral, Nightly PRN, Nitin, Nimesh A, FNP    labetalol 20 mg/4 mL (5 mg/mL) IV syring, 10 mg, Intravenous, Q4H PRN, Nitin, Nimesh A, FNP, 10 mg at 12/26/23 0623    metoprolol injection 10 mg, 10 mg, Intravenous, Q2H PRN, Nitin, Nimesh A, FNP    nitroGLYCERIN SL tablet 0.4 mg, 0.4 mg, Sublingual, Q5 Min PRN, Nitin, Nimesh A, FNP    ondansetron disintegrating tablet 4 mg, 4 mg, Oral, Q6H PRN, Nitin, Nimesh A, FNP    ondansetron disintegrating tablet 8 mg, 8 mg, Oral, Q6H PRN, Nitin, Nimesh A, FNP    polyethylene glycol packet 17 g, 17 g, Oral, BID, Seema Colon, HONGP, 17 g at 01/02/24 0723    promethazine tablet 25 mg, 25 mg, Oral, Q6H PRN, Nitin, Nimesh A, FNP    tamsulosin 24 hr capsule 0.4 mg, 0.4 mg, Oral, QHS, Nimesh Taveras A, FNP, 0.4 mg at 01/01/24 2016    traMADoL tablet 50 mg, 50 mg, Oral, Q8H PRN, Nimesh Taveras A, FNP     Review of Systems   Complete 12-point review of symptoms negative except for what's mentioned in HPI     Objective:     BP (!) 155/81  "  Pulse 79   Temp 97.9 °F (36.6 °C) (Oral)   Resp 16   Ht 5' 10" (1.778 m)   Wt 79.3 kg (174 lb 13.2 oz)   SpO2 98%   BMI 25.08 kg/m²        Physical Exam  Vitals reviewed.   HENT:      Head:      Comments: [place]  Eyes:      Pupils: Pupils are equal, round, and reactive to light.   Cardiovascular:      Rate and Rhythm: Normal rate and regular rhythm.      Comments: Heart sounds: Murmur heard.   Systolic murmur is present with a grade of 3/6.   Pulmonary:      Effort: Pulmonary effort is normal.      Breath sounds: Normal breath sounds.   Abdominal:      General: Bowel sounds are normal.   Musculoskeletal:         General: Normal range of motion.      Comments: Right-sided weakness   Skin:     General: Skin is warm.   Neurological:      General: No focal deficit present.      Mental Status: He is alert and oriented to person, place, and time.      Motor: Weakness present.   Psychiatric:         Mood and Affect: Mood normal.      *MD performed and documented physical examination       Lines/Drains/Airways       Peripheral Intravenous Line  Duration                  Peripheral IV - Single Lumen 12/25/23 2000 20 G Distal;Left;Posterior Forearm 7 days                    Labs  Recent Results (from the past 24 hour(s))   Basic Metabolic Panel    Collection Time: 01/02/24  5:36 AM   Result Value Ref Range    Sodium Level 143 132 - 146 mmol/L    Potassium Level 4.4 3.5 - 5.1 mmol/L    Chloride 112 (H) 98 - 111 mmol/L    Carbon Dioxide 22 (L) 23 - 31 mmol/L    Glucose Level 123 (H) 75 - 121 mg/dL    Blood Urea Nitrogen 28.6 (H) 8.4 - 25.7 mg/dL    Creatinine 1.42 (H) 0.73 - 1.18 mg/dL    BUN/Creatinine Ratio 20     Calcium Level Total 10.4 (H) 8.8 - 10.0 mg/dL    Anion Gap 9.0 mEq/L    eGFR 46 mls/min/1.73/m2     Radiology  MRI brain without contrast on 12/18/2023, IMPRESSION: Patchy acute ischemic changes in the left posterior frontal and temporal lobes. Moderate chronic microvascular ischemic changes.  Assessment/Plan: "     89 y.o. AAM admitted on 12/21/2023     Ischemic CVA   - to left posterior frontal and temporal lobes with right sided weakness  - continue                Aspirin 81 mg daily                Lipitor 20 mg daily   - defer to physiatry for rehab and pain management  - PT/OT/RT/ST following     PAD  - stable   - continue                Aspirin 81 mg daily                Lipitor 20 mg daily      HLD  -  FLP outpatient with PCP  - continue                Lipitor 20 mg daily       VHD  -  aortic stenosis  -  to follow-up with cardiology outpatient     HTN  - BP improved  - continue                Norvasc 10 mg daily (initiated 12/23)                Hydralazine 10 mg every 2 hours as needed for BP > 160/90                Labetalol 10 mg every 2 hours as needed for BP > 160/90  - low sodium diet     Constipation  - stable   - continue  Colace 100 mg BID   Miralax 17 gm BID (initiated 12/27)     BPH  - stable  - continue                Finasteride 5 mg daily                 Flomax 0.4 mg at bedtime    BRIAN  - current  - initiate   NS 75 mL/HR x1 L     VTE Prophylaxis:  SCDs  COVID-19 testing:  unknown  COVID-19 vaccination status:  vaccinated     POA: No  Living will: No  Contacts: Awa Mckee (dtr) [phone]       CODE STATUS: Full Code  Internal Medicine (attending): Alvarez Zayas MD  Physiatry (consulting):  Wilder Pham MD     OUTPATIENT PROVIDERS    [facility]  [facility]    Neurology:  Tyron Rahman MD     DISPOSITION:  Sleep hygiene, bowel maintenance, and appetite at goal.  Last BM 1/1.  Systolic BP moderately elevated.  Lab work otherwise unremarkable with no recorded fevers.  Renal indices trending up slightly.  Still with elevated BP.  Initiate Coreg 3.125 mg b.i.d. initiate normal saline at 75 mL/HR x1 L. repeat lab work in the morning.  Continue aggressive mobilization as tolerated.  Monitor closely.  Notify of acute changes.  Staffing completed today.    Staffing 1/2/2023: Incontinent of bowel and bladder. No  skin issues. RT: Overall min assist with right sided weakness.  Some balance issues.  Appetite is fair-good.  Meeting needs with boost.  PT: Overall BSA to CGA with transfers and gait.  Ambulating 130 feet with RW-slow.  CBA to CGA for technique, but does well.  Needs min assist for curbs and ramps.  Family training went well.  Limited by left foot drag, worsens with fatigue. High fall risk.  OT: Max assist with bathing and footwear.  [place] non use do to family doing things at home.  Overall CGA with standing if given time.  Supervision with oral care.  Limited by [place] and cognition.  Family training went well. Needs 24/7 care. Projected discharge 1/5 home with HH and 24/7 care.  Projected discharge 1/5.       Slava Taveras NP conducted independent physical examination and assisted with medical documentation.    Total time spent on this encounter including chart review and direct MD + NP 1-on-1 patient interaction: 53 minutes   Over 50% of this time was spent in counseling and coordination of care

## 2024-01-03 LAB
ANION GAP SERPL CALC-SCNC: 7 MEQ/L
APPEARANCE UR: ABNORMAL
BACTERIA #/AREA URNS AUTO: ABNORMAL /HPF
BILIRUB UR QL STRIP.AUTO: NEGATIVE
BUN SERPL-MCNC: 26.5 MG/DL (ref 8.4–25.7)
CALCIUM SERPL-MCNC: 10.3 MG/DL (ref 8.8–10)
CHLORIDE SERPL-SCNC: 112 MMOL/L (ref 98–111)
CO2 SERPL-SCNC: 24 MMOL/L (ref 23–31)
COLOR UR AUTO: YELLOW
CREAT SERPL-MCNC: 1.56 MG/DL (ref 0.73–1.18)
CREAT/UREA NIT SERPL: 17
GFR SERPLBLD CREATININE-BSD FMLA CKD-EPI: 41 MLS/MIN/1.73/M2
GLUCOSE SERPL-MCNC: 113 MG/DL (ref 75–121)
GLUCOSE UR QL STRIP.AUTO: NEGATIVE
KETONES UR QL STRIP.AUTO: NEGATIVE
LEUKOCYTE ESTERASE UR QL STRIP.AUTO: ABNORMAL
NITRITE UR QL STRIP.AUTO: NEGATIVE
PH UR STRIP.AUTO: 7 [PH]
POTASSIUM SERPL-SCNC: 4.2 MMOL/L (ref 3.5–5.1)
PROT UR QL STRIP.AUTO: NEGATIVE
RBC #/AREA URNS AUTO: ABNORMAL /HPF
RBC UR QL AUTO: ABNORMAL
SODIUM SERPL-SCNC: 143 MMOL/L (ref 132–146)
SP GR UR STRIP.AUTO: 1.01 (ref 1–1.03)
SQUAMOUS #/AREA URNS AUTO: ABNORMAL /HPF
UROBILINOGEN UR STRIP-ACNC: 0.2
WBC #/AREA URNS AUTO: ABNORMAL /HPF

## 2024-01-03 PROCEDURE — 25000003 PHARM REV CODE 250: Performed by: NURSE PRACTITIONER

## 2024-01-03 PROCEDURE — 97110 THERAPEUTIC EXERCISES: CPT

## 2024-01-03 PROCEDURE — 97168 OT RE-EVAL EST PLAN CARE: CPT

## 2024-01-03 PROCEDURE — 94761 N-INVAS EAR/PLS OXIMETRY MLT: CPT

## 2024-01-03 PROCEDURE — 99900035 HC TECH TIME PER 15 MIN (STAT)

## 2024-01-03 PROCEDURE — 11800000 HC REHAB PRIVATE ROOM

## 2024-01-03 PROCEDURE — 81001 URINALYSIS AUTO W/SCOPE: CPT | Performed by: NURSE PRACTITIONER

## 2024-01-03 PROCEDURE — 51798 US URINE CAPACITY MEASURE: CPT

## 2024-01-03 PROCEDURE — 97164 PT RE-EVAL EST PLAN CARE: CPT

## 2024-01-03 PROCEDURE — 97530 THERAPEUTIC ACTIVITIES: CPT

## 2024-01-03 PROCEDURE — 80048 BASIC METABOLIC PNL TOTAL CA: CPT | Performed by: NURSE PRACTITIONER

## 2024-01-03 PROCEDURE — 97535 SELF CARE MNGMENT TRAINING: CPT

## 2024-01-03 PROCEDURE — 97116 GAIT TRAINING THERAPY: CPT

## 2024-01-03 PROCEDURE — 63600175 PHARM REV CODE 636 W HCPCS: Performed by: NURSE PRACTITIONER

## 2024-01-03 RX ORDER — CARVEDILOL 6.25 MG/1
6.25 TABLET ORAL 2 TIMES DAILY
Status: DISCONTINUED | OUTPATIENT
Start: 2024-01-03 | End: 2024-01-03

## 2024-01-03 RX ORDER — CARVEDILOL 6.25 MG/1
6.25 TABLET ORAL 2 TIMES DAILY WITH MEALS
Status: DISCONTINUED | OUTPATIENT
Start: 2024-01-03 | End: 2024-01-05 | Stop reason: HOSPADM

## 2024-01-03 RX ADMIN — AMLODIPINE BESYLATE 10 MG: 5 TABLET ORAL at 07:01

## 2024-01-03 RX ADMIN — CARVEDILOL 6.25 MG: 6.25 TABLET, FILM COATED ORAL at 08:01

## 2024-01-03 RX ADMIN — ASPIRIN 81 MG: 81 TABLET, COATED ORAL at 07:01

## 2024-01-03 RX ADMIN — CEFTRIAXONE SODIUM 1 G: 1 INJECTION, POWDER, FOR SOLUTION INTRAMUSCULAR; INTRAVENOUS at 02:01

## 2024-01-03 RX ADMIN — POLYETHYLENE GLYCOL 3350 17 G: 17 POWDER, FOR SOLUTION ORAL at 07:01

## 2024-01-03 RX ADMIN — DOCUSATE SODIUM 100 MG: 100 CAPSULE, LIQUID FILLED ORAL at 07:01

## 2024-01-03 RX ADMIN — DOCUSATE SODIUM 100 MG: 100 CAPSULE, LIQUID FILLED ORAL at 08:01

## 2024-01-03 RX ADMIN — ATORVASTATIN CALCIUM 20 MG: 10 TABLET, FILM COATED ORAL at 07:01

## 2024-01-03 RX ADMIN — CARVEDILOL 6.25 MG: 6.25 TABLET, FILM COATED ORAL at 04:01

## 2024-01-03 RX ADMIN — FINASTERIDE 5 MG: 5 TABLET, FILM COATED ORAL at 07:01

## 2024-01-03 RX ADMIN — TAMSULOSIN HYDROCHLORIDE 0.4 MG: 0.4 CAPSULE ORAL at 08:01

## 2024-01-03 RX ADMIN — POLYETHYLENE GLYCOL 3350 17 G: 17 POWDER, FOR SOLUTION ORAL at 08:01

## 2024-01-03 NOTE — PT/OT/SLP EVAL
Physical Therapy Rehab Re-Evaluation    Patient Name:  Chichi Mckee   MRN:  [id number]    Recommendations:     Discharge Recommendations:  Low Intensity Therapy   Discharge Equipment Recommendations: bedside commode   Barriers to discharge: Severity of Deficits     Assessment:     Chichi Mckee is a 89 y.o. male admitted with a medical diagnosis of Stroke.  He presents with the following impairments/functional limitations:  weakness, impaired endurance, impaired self care skills, impaired functional mobility, impaired balance, gait instability, decreased upper extremity function, decreased lower extremity function, decreased safety awareness Pt continued with R sided neglect and impaired hearing. Pt requires mac VC for safety throughout treatment 2/2 hearing impairment. Pt does appear tired this treatment session. Notified RN of PTs findings..    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 Pt. Has a past medical history of HTN, HLD, PAD, aortic stenosis, bladder mass, BPH, and very [place]    General Precautions: Standard, fall, hearing impaired     Orthopedic Precautions: N/A     Braces: N/A    Rehab Prognosis: Good; patient would benefit from acute skilled PT services to address these deficits and reach maximum level of function.      History:     Past Medical History:   Diagnosis Date    Hypertension        No past surgical history on file.    Subjective     Patient Goal: N/A    Patient Comments: N/A    Patients cultural, spiritual, Gnosticism conflicts given the current situation: no       Living Environment  People in Home: spouse, child(anai), adult  Name(s) of People in Home: Awa Mckee  Living Arrangements: house  Home Accessibility: wheelchair accessible  Number of Stairs, Main Entrance: none  Home Layout: Able to live on 1st floor  Living Environment Comment: To enter on ramp  Equipment Currently Used at Home: bath bench, walker, rolling,  grab bar    Prior Level of Function  Ambulation Skills: needs device  Stairs: unable to perform  Transfer Skills: needs device  ADL Skills: needs device        Objective:     Communicated with RN prior to session.  Patient found supine with chair check, peripheral IV (telesitter)  upon PT entry to room.    Vitals   Vitals at Rest  /65   HR 82   O2 Sat     Pain Pain Rating 1: 0/10     Vitals With Activity  BP (!) 147/74   HR 69   O2 Sat     Pain Pain Rating 1: 0/10     Respiratory Status: Room air    Exams  Cognitive Exam:  Patient is oriented to Person and Place  Sensation:          -       WNL  RLE ROM:          -       WFL  RLE Strength:          -       WFL  LLE ROM:          -       WFL  LLE Strength:          -       WFL    Functional Mobility      GGs   Admit Current   Status Goal   Functional Area: Care Score: Care Score: Care Score:   Roll Left and Right 4 4 Independent   Sit to Lying 4 4 Independent   Lying to Sitting on Side of Bed 4 4 Independent   Sit to Stand 3 4 Set-up/clean-up   Chair/Bed-to-Chair Transfer 3 4 Set-up/clean-up   Car Transfer 88 4 Set-up/clean-up   Walk 10 Feet 3 4 Set-up/clean-up   Walk 50 Feet with Two Turns 3 4 Set-up/clean-up   Walk 150 Feet 88 4 Set-up/clean-up   Walk 10 Feet Uneven Surface 3 4  Performed on Mat with RW  Supervision or touching assistance   1 Step (Curb) 88 4 Supervision or touching assistance   4 Steps 9 4 Not applicable   12 Steps 9 4  Performed in // bars on 4 in step  Not applicable   Picking Up Object 88  Independent   Wheel 50 Feet with Two Turns 9  Not applicable   Wheel 150 Feet 9  Not applicable     Therapeutic Activities and Exercises:  Patient educated on role of acute care PT and PT POC, safety while in hospital including calling nurse for mobility, and call light usage  Patient educated about importance of OOB mobility and remaining up in chair most of the day.    Seated therX 15 x 3 BLE 2#    Hip flex/add/abd   Knee ext/flex    Ankle pumps        Activity Tolerance: Excellent    Patient left up in chair with all lines intact, call button in reach, and chair alarm on.    Education Provided: roles and goals of PT/PTA, transfer training, bed mob, gait training, balance training, safety awareness, body mechanics, assistive device, wheelchair management, strengthening exercises, and fall prevention    Expected compliance: High compliance      Plan:     During this hospitalization, patient to be seen 5 x/week (5-7 x week) to address the identified rehab impairments via gait training, therapeutic activities, therapeutic exercises, neuromuscular re-education, wheelchair management/training and progress toward the following goals:    GOALS:   Multidisciplinary Problems       Physical Therapy Goals          Problem: Physical Therapy    Goal Priority Disciplines Outcome Goal Variances Interventions   Physical Therapy Goal     PT, PT/OT Ongoing, Progressing     Description: Bed Mobility:  Roll left and right with setup/clean-up assist.  Sit to supine transfer with setup/clean-up assist.  Supine to sit transfer with setup/clean-up assist.    Transfers:  Sit to stand transfer with setup/clean-up assist using RW.  Bed to chair transfer with setup/clean-up assist Stand Step  using RW.  MET-Car transfer with supervision/touching assist using RW.  MET- an object from the ground in standing position with supervision/touching assist using RW.    Mobility:  Met-Ambulate 150 feet with supervision/touching assist using RW.  MET-Ambulate 15 feet on uneven surfaces/ramps with supervision/touching assist using RW.  MET-Pt ascended/descended a 4 inch curb with supervision/touching assist using RW.  MET-Ascend/descend 4 stairs with supervision/touching assist using bilateral handrails.                        Plan of Care Expires:  01/09/24  PT Next Visit Date: 01/09/24  Plan of Care reviewed with: patient, family      Additional Infomation:           Time Tracking:      Therapy Time   PT Received On: 01/03/24  PT Start Time: 0930  PT Stop Time: 1030  PT Total Time (min): 60 min  PT Individual: 60  Missed Time:    Time Missed due to:      Billable Minutes: Re-eval 30, Therapeutic Activity 15, and Therapeutic Exercise 15    01/03/2024

## 2024-01-03 NOTE — PROGRESS NOTES
[facility] ([facility])  Rehab Progress Note    Patient Name: Chichi Mckee  MRN: [id number]  Age: 89 y.o. 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  PMH significant for  HTN, HLD, PAD, aortic stenosis, history of bladder mass, and BPH.  Workup significant for CVA.  Code fast initiated.  CT head negative.  T and K initiated at 12:48 p.m..  Admitted to ICU.  CTA head and neck significant for left vertebral artery occluded proximally.  LVEF  55-60% with negative bubble study.  Repeat CT head negative for intracranial hemorrhage.  MRI brain with contrast significant for patchy acute ischemic changes in the left posterior frontal and temporal lobes.  Diagnostic angiogram planned with Interventional Neurology on , but family declined due to advanced age.  Neurology recommended daily aspirin 81 mg daily and Lipitor 20 mg daily.  Tolerated transfer to [facility]  inpatient rehab unit on  without incident.   Today's Information: No acute events overnight.  Working with therapy.  Therapy reports declined today.  Sleep hygiene and bowel maintenance at goal.  Appetite is good.  BP elevated.  Renal indices trending up despite IVF with suspicion urine retention.  No new imaging today.    Review of patient's allergies indicates:   Allergen Reactions    Hydrocodone-acetaminophen         Current Facility-Administered Medications:     acetaminophen tablet 650 mg, 650 mg, Oral, Q4H PRN, Nitin, Nimesh A, FNP, 650 mg at 23 0750    ALPRAZolam tablet 0.25 mg, 0.25 mg, Oral, TID PRN, Nitin, Nimesh A, FNP    amLODIPine  "tablet 10 mg, 10 mg, Oral, Daily, Seema Colon, FNP, 10 mg at 01/03/24 0723    aspirin EC tablet 81 mg, 81 mg, Oral, Daily, Nitin, Nimesh A, FNP, 81 mg at 01/03/24 0723    atorvastatin tablet 20 mg, 20 mg, Oral, Daily, Nitin, Nimesh A, FNP, 20 mg at 01/03/24 0723    benzonatate capsule 100 mg, 100 mg, Oral, TID PRN, Nitin, Nimesh A, FNP    bisacodyL suppository 10 mg, 10 mg, Rectal, Daily PRN, Nitin, Nimesh A, FNP    carvediloL tablet 6.25 mg, 6.25 mg, Oral, BID WM, Nitin, Nimesh A, FNP    docusate sodium capsule 100 mg, 100 mg, Oral, BID, Nitin, Nimesh A, FNP, 100 mg at 01/03/24 0724    finasteride tablet 5 mg, 5 mg, Oral, Daily, Nitin, Nimesh A, FNP, 5 mg at 01/03/24 0724    hydrALAZINE injection 10 mg, 10 mg, Intravenous, Q4H PRN, Nitin, Nimesh A, FNP    hydrOXYzine pamoate capsule 50 mg, 50 mg, Oral, Nightly PRN, Nitin, Nimesh A, FNP    labetalol 20 mg/4 mL (5 mg/mL) IV syring, 10 mg, Intravenous, Q4H PRN, Nitin, Nimesh A, FNP, 10 mg at 12/26/23 0623    metoprolol injection 10 mg, 10 mg, Intravenous, Q2H PRN, Nitin, Nimesh A, FNP    nitroGLYCERIN SL tablet 0.4 mg, 0.4 mg, Sublingual, Q5 Min PRN, Nitin, Nimesh A, FNP    ondansetron disintegrating tablet 4 mg, 4 mg, Oral, Q6H PRN, Nitin, Nimesh A, FNP    ondansetron disintegrating tablet 8 mg, 8 mg, Oral, Q6H PRN, Nitin, Nimesh A, FNP    polyethylene glycol packet 17 g, 17 g, Oral, BID, Seema Colon, FNP, 17 g at 01/03/24 0723    promethazine tablet 25 mg, 25 mg, Oral, Q6H PRN, Nimesh Taveras FNP    tamsulosin 24 hr capsule 0.4 mg, 0.4 mg, Oral, QHS, Nimesh Tavreas FNP, 0.4 mg at 01/02/24 2024    traMADoL tablet 50 mg, 50 mg, Oral, Q8H PRN, Nimesh Taveras FNP     Review of Systems   Complete 12-point review of symptoms negative except for what's mentioned in HPI     Objective:     BP (!) 163/79   Pulse 78   Temp 98.3 °F (36.8 °C) (Oral)   Resp 16   Ht 5' 10" " (1.778 m)   Wt 79.3 kg (174 lb 13.2 oz)   SpO2 98%   BMI 25.08 kg/m²        Physical Exam  Vitals reviewed.   HENT:      Head:      Comments: [place]  Eyes:      Pupils: Pupils are equal, round, and reactive to light.   Cardiovascular:      Rate and Rhythm: Normal rate and regular rhythm.      Comments: Heart sounds: Murmur heard.   Systolic murmur is present with a grade of 3/6.   Pulmonary:      Effort: Pulmonary effort is normal.      Breath sounds: Normal breath sounds.   Abdominal:      General: Bowel sounds are normal.   Musculoskeletal:         General: Normal range of motion.      Comments: Right-sided weakness   Skin:     General: Skin is warm.   Neurological:      General: No focal deficit present.      Mental Status: He is alert and oriented to person, place, and time.      Motor: Weakness present.   Psychiatric:         Mood and Affect: Mood normal.      *MD performed and documented physical examination       Lines/Drains/Airways       Peripheral Intravenous Line  Duration                  Peripheral IV - Single Lumen 12/25/23 2000 20 G Distal;Left;Posterior Forearm 8 days                    Labs  Recent Results (from the past 24 hour(s))   Basic Metabolic Panel    Collection Time: 01/03/24  5:54 AM   Result Value Ref Range    Sodium Level 143 132 - 146 mmol/L    Potassium Level 4.2 3.5 - 5.1 mmol/L    Chloride 112 (H) 98 - 111 mmol/L    Carbon Dioxide 24 23 - 31 mmol/L    Glucose Level 113 75 - 121 mg/dL    Blood Urea Nitrogen 26.5 (H) 8.4 - 25.7 mg/dL    Creatinine 1.56 (H) 0.73 - 1.18 mg/dL    BUN/Creatinine Ratio 17     Calcium Level Total 10.3 (H) 8.8 - 10.0 mg/dL    Anion Gap 7.0 mEq/L    eGFR 41 mls/min/1.73/m2     Radiology  MRI brain without contrast on 12/18/2023, IMPRESSION: Patchy acute ischemic changes in the left posterior frontal and temporal lobes. Moderate chronic microvascular ischemic changes.  Assessment/Plan:     89 y.o. AAM admitted on 12/21/2023     Ischemic CVA   - to left  posterior frontal and temporal lobes with right sided weakness  - continue                Aspirin 81 mg daily                Lipitor 20 mg daily   - defer to physiatry for rehab and pain management  - PT/OT/RT/ST following     PAD  - stable   - continue                Aspirin 81 mg daily                Lipitor 20 mg daily      HLD  -  FLP outpatient with PCP  - continue                Lipitor 20 mg daily       VHD  -  aortic stenosis  -  to follow-up with cardiology outpatient     HTN  - BP improved  - continue                Norvasc 10 mg daily (initiated 12/23)                Hydralazine 10 mg every 2 hours as needed for BP > 160/90                Labetalol 10 mg every 2 hours as needed for BP > 160/90  - low sodium diet     Constipation  - stable   - continue  Colace 100 mg BID   Miralax 17 gm BID (initiated 12/27)     BPH  - stable  - continue                Finasteride 5 mg daily                 Flomax 0.4 mg at bedtime    BRIAN 2/2 obstructive uropathy  - in and out catheter with 1300 mL retention  - obtain UA  - repeat labs in am     VTE Prophylaxis:  SCDs  COVID-19 testing:  unknown  COVID-19 vaccination status:  vaccinated     POA: No  Living will: No  Contacts: Awa Mckee (dtr) [phone]       CODE STATUS: Full Code  Internal Medicine (attending): Alvarez Zayas MD  Physiatry (consulting):  Wilder Pham MD     OUTPATIENT PROVIDERS    PCP:  [facility]    Neurology:  Tyron Rahman MD     DISPOSITION:  Sleep hygiene, bowel maintenance, and appetite at goal.  BP moderately elevated.  Renal indices trending up s/p IVF.  Bladder scan unavailable.  In and out catheter significant for 1300 mL of urine.  UA obtain-pending.  Increase Coreg 6.25 mg b.i.d..  Continue aggressive mobilization as tolerated.  Repeat lab work in the morning.  Monitor closely.  Notify acute changes.    Staffing 1/2/2023: Incontinent of bowel and bladder. No skin issues. RT: Overall min assist with right sided weakness.  Some balance  issues.  Appetite is fair-good.  Meeting needs with boost.  PT: Overall BSA to CGA with transfers and gait.  Ambulating 130 feet with RW-slow.  CBA to CGA for technique, but does well.  Needs min assist for curbs and ramps.  Family training went well.  Limited by left foot drag, worsens with fatigue. High fall risk.  OT: Max assist with bathing and footwear.  [place] non use do to family doing things at home.  Overall CGA with standing if given time.  Supervision with oral care.  Limited by [place] and cognition.  Family training went well. Needs 24/7 care. Projected discharge 1/5 home with HH and 24/7 care.  Projected discharge 1/5.       Slava Taveras NP conducted independent physical examination and assisted with medical documentation.

## 2024-01-03 NOTE — PT/OT/SLP RE-EVAL
"Occupational Therapy Inpatient Rehab Re-Evaluation    Name: Chichi Mckee  MRN: [id number]    Recommendations:     Discharge Recommendations:  Low Intensity Therapy   Discharge Equipment Recommendations: bedside commode   Barriers to discharge:  decreased functional mobility for ADLs, decreased coordination/proprioception    Assessment:  Chichi Mckee is a 89 y.o. male admitted with a medical diagnosis of Stroke.  He presents with the following impairments/functional limitations:  weakness, impaired endurance, impaired sensation, impaired self care skills, impaired functional mobility, gait instability, impaired balance, impaired cognition, decreased upper extremity function, decreased lower extremity function, decreased safety awareness.    General Precautions: Standard, fall, hearing impaired     Orthopedic Precautions:N/A     Braces: N/A    Rehab Prognosis: Fair; patient would benefit from acute skilled OT services to address these deficits and reach maximum level of function.      History:     Past Medical History:   Diagnosis Date    Hypertension        No past surgical history on file.    Subjective   Pt pleasant and cooperative but drowsy.    Patient/Family Comments/goals: "I ate good."    Vitals  Vitals at Rest  /71   HR 68   O2 Sat     Pain Pain Rating 1: 0/10     Vitals With Activity  BP (!) 158/84   HR 80   O2 Sat     Pain Pain Rating 1: 0/10     Respiratory Status: Room air    Patients cultural, spiritual, Lutheran conflicts given the current situation: no       Living Environment   Living Environment  People in Home: spouse, child(anai), adult  Name(s) of People in Home: Awa Mckee  Living Arrangements: house  Home Accessibility: wheelchair accessible  Number of Stairs, Main Entrance: none  Home Layout: Able to live on 1st floor  Living Environment Comment: To enter on ramp  Equipment Currently Used at Home: rollator  Shower Setup: Walk-in shower    Prior Level of Function  BADL: Minimal " Assitance    IADL: Maximum Assistance    Equipment used at home: bath bench, walker, rolling, grab bar.  DME owned (not currently used): none.      Upon discharge, patient will have assistance from family.    Objective:     Patient found supine with peripheral IV  upon OT entry to room.    Mobility   Patient completed:  Supine to Sit with minimum assistance  Sit to Supine with minimum assistance  Sit to Stand Transfer with max assistance with rolling walker  Stand to Sit Transfer with mod assistance with rolling walker  Toilet Transfer Step Transfer technique with minimum assistance with  rolling walker  Tub Transfer Step Transfer technique with maximal assistance with rolling walker needed max assist to bring legs over tub's edge.    Functional Mobility   In room mobility, pt needed max-mod facilitation to RLE to advance with RW.     ADLs     Current Status   Eating 5   Oral Hygiene 5   Shower, Bathe Self 3   Upper Body Dressing 3 Mod assist with ceferino tech   Lower Body Dressing 2 Max assist for threading and pulling over hips. Pt able to pull up legs. Ceferino tech.   Toileting Hygiene 2 Incontinent of bladder, max assist with up/down over hips   Toilet Transfer 3    Putting On, Taking Off Footwear 2 Max assist with donning/doffing socks     Limiting Factors for ADLs: motor, sensory, endurance, limited ROM, balance, weakness, perceptual, coordination, cognition, and safety awareness    Exams     ROM:          -       WFL, except R delayed with movement    Hand Dominance: Right    ROM Hand  Left Hand: WFL  Right Hand: WFL    Strength  Overall Strength:          -       WFL, R weaker than L 3-/5      R hand 2+/5  L hand 4/5    Sensation  Left:          -       WNL  Right:          -       WNL    Coordination:    -       Impaired  Right hand, finger to nose, Right hand thumb/finger opposition skills, and Right hand, manipulation of objects     Tone  Left: WNL  Right: WNL    Visual/Perceptual  Intact    Cognition:    WFL    Balance    Sitting  Sitting Surface: TTB  Static: No UE Support, Good (-)  Dynamic: No UE extremity support, Fair (-)    Standing  Static: Bilateral UE Extremity Support, Good (-)  Dynamic: Bilateral UE extremity support, Poor (+)    Patient left supine with all lines intact, call button in reach, bed alarm on, and nursing notified.    Education provided: Roles and goals of OT, ADLs, transfer training, bed mobility, assistive device, sequencing, safety precautions, and fall prevention    Multidisciplinary Problems       Occupational Therapy Goals          Problem: Occupational Therapy    Goal Priority Disciplines Outcome Interventions   Occupational Therapy Goal     OT, PT/OT Ongoing, Progressing    Description: ADLs:  Pt to perform oral care tasks with set up while standing at sink MET  Pt to perform UB dressing with SPV.  Pt to perform LB dressing with Partial mod assist with AE as needed.   Pt to perform putting on/off footwear task with Partial mod with AE as needed.  Pt to perform toileting with SPV.  Pt to perform bathing with Partial mod assist with AE as needed.    Functional Transfers:  Pt to perform toilet transfers with incidental touching.  Pt to perform a tub transfer with incidental touching.    Balance, Strengthening, Endurance, Balance:  Pt to consistently demonstrate adherence to orthopedic precautions during all ADL's as instructed by OT.  Pt to demonstrate good dynamic standing balance as required to perform ADL's from standing level.  Pt to demonstrate good BUE strength during functional task   Pt to demonstrate consistent adherence to breathing control and energy conservation techniques as educated by OT.                        Plan     During this hospitalization, patient to be seen 5 x/week (5-7 days per week) to address the identified rehab impairments via self-care/home management, therapeutic activities, therapeutic exercises, neuromuscular re-education, sensory integration,  wheelchair management/training and progress toward the following goals:    Plan of Care Expires:  01/10/24    Time Tracking     OT Received On: 01/03/24  Time In 0800     Time Out 0900  Total Time 60 min  Therapy Time: OT Individual: 60  Missed Time:    Missed Time Reason:      Billable Minutes: Re-eval 15 and Self Care/Home Management 45    01/03/2024

## 2024-01-03 NOTE — PT/OT/SLP PROGRESS
Physical Therapy Inpatient Rehab Treatment    Patient Name:  Chichi Mckee   MRN:  [id number]    Recommendations:     Discharge Recommendations:  Low Intensity Therapy   Discharge Equipment Recommendations:     Barriers to discharge: Severity of Deficits     Assessment:     Chichi Mckee is a 89 y.o. male admitted with a medical diagnosis of Stroke.  He presents with the following impairments/functional limitations:  weakness, impaired endurance, impaired sensation, impaired self care skills, impaired functional mobility, gait instability, impaired balance, impaired cognition, decreased upper extremity function, decreased lower extremity function, decreased safety awareness.  Pt appears more alert this PM session.     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 Pt. Has a past medical history of HTN, HLD, PAD, aortic stenosis, bladder mass, BPH, and very [place]    General Precautions: Standard, hearing impaired     Orthopedic Precautions:N/A     Braces: N/A    Rehab Prognosis: Good; patient would benefit from acute skilled PT services to address these deficits and reach maximum level of function.      History:     Past Medical History:   Diagnosis Date    Hypertension        No past surgical history on file.    Subjective     Patient comments: N/A    Respiratory Status: Room air    Patients cultural, spiritual, Religion conflicts given the current situation:      Objective:     Communicated with RN prior to session.  Patient found up in chair with peripheral IV  upon PT entry to room.    Pt is Oriented x3 and Alert, Cooperative, and Motivated.    Vitals   Vitals at Rest  /84   HR 76   O2 Sat    Pain      Vitals With Activity  BP (!) 156/75   HR 77   O2 Sat     Pain Pain Rating 1: 0/10       Functional Mobility:        Current   Status  Discharge   Goal   Functional Area: Care Score:    Roll Left and Right 6 Independent   Sit to Lying 6  Independent   Lying to Sitting on Side of Bed 6  Increased time and pt uses bed rails. Pt has bed rails at home  Independent   Sit to Stand 4 Set-up/clean-up   Chair/Bed-to-Chair Transfer 4 Set-up/clean-up   Car Transfer  Set-up/clean-up   Walk 10 Feet 4 Set-up/clean-up   Walk 50 Feet with Two Turns 4 Set-up/clean-up   Walk 150 Feet 4  178' overall CGA with VC for RLE knee flex for increased foot clearance.  Set-up/clean-up   Walk 10 Feet Uneven Surface  Supervision or touching assistance   1 Step (Curb)  Supervision or touching assistance   4 Steps  Not applicable   12 Steps  Not applicable   Picking Up Object 4  With Reacher and RW  Independent   Wheel 50 Feet with Two Turns   Not applicable   Wheel 150 Feet   Not applicable       Therapeutic Activities and Exercises:  Patient educated on role of acute care PT and PT POC, safety while in hospital including calling nurse for mobility, and call light usage  Patient educated about importance of OOB mobility and remaining up in chair most of the day.    UBE 5'/5'     Activity Tolerance: Good    Patient left supine with all lines intact, call button in reach, and bed alarm on.    Education provided: roles and goals of PT/PTA, transfer training, bed mob, gait training, safety awareness, wheelchair management, and strengthening exercises    Expected compliance: High compliance    Plan:     During this hospitalization, patient to be seen 5 x/week (5-7 x week) to address the identified rehab impairments via gait training, therapeutic activities, therapeutic exercises, neuromuscular re-education, wheelchair management/training and progress toward the following goals:    GOALS:   Multidisciplinary Problems       Physical Therapy Goals          Problem: Physical Therapy    Goal Priority Disciplines Outcome Goal Variances Interventions   Physical Therapy Goal     PT, PT/OT Ongoing, Progressing     Description: Bed Mobility:  Roll left and right with setup/clean-up assist.  Sit  to supine transfer with setup/clean-up assist.  Supine to sit transfer with setup/clean-up assist.    Transfers:  Sit to stand transfer with setup/clean-up assist using RW.  Bed to chair transfer with setup/clean-up assist Stand Step  using RW.  MET-Car transfer with supervision/touching assist using RW.  MET- an object from the ground in standing position with supervision/touching assist using RW.    Mobility:  Met-Ambulate 150 feet with supervision/touching assist using RW.  MET-Ambulate 15 feet on uneven surfaces/ramps with supervision/touching assist using RW.  MET-Pt ascended/descended a 4 inch curb with supervision/touching assist using RW.  MET-Ascend/descend 4 stairs with supervision/touching assist using bilateral handrails.                        Plan of Care Expires:  01/09/24  PT Next Visit Date: 01/09/24  Plan of Care reviewed with: patient, family    Additional Information:         Time Tracking:     Therapy Time  PT Received On: 01/03/24  PT Start Time: 1300  PT Stop Time: 1400  PT Total Time (min): 60 min   PT Individual: 60  Missed Time:    Time Missed due to:      Billable Minutes: Gait Training 30, Therapeutic Activity 15, and Therapeutic Exercise 15    01/03/2024

## 2024-01-03 NOTE — PLAN OF CARE
Problem: Occupational Therapy  Goal: Occupational Therapy Goal  Description: ADLs:  Pt to perform oral care tasks with set up while standing at sink MET  Pt to perform UB dressing with SPV.  Pt to perform LB dressing with Partial mod assist with AE as needed.   Pt to perform putting on/off footwear task with Partial mod with AE as needed.  Pt to perform toileting with SPV.  Pt to perform bathing with Partial mod assist with AE as needed.    Functional Transfers:  Pt to perform toilet transfers with incidental touching.  Pt to perform a tub transfer with incidental touching.    Balance, Strengthening, Endurance, Balance:  Pt to consistently demonstrate adherence to orthopedic precautions during all ADL's as instructed by OT.  Pt to demonstrate good dynamic standing balance as required to perform ADL's from standing level.  Pt to demonstrate good BUE strength during functional task   Pt to demonstrate consistent adherence to breathing control and energy conservation techniques as educated by OT.   1/3/2024 1251 by Penelope Millan, OT  Outcome: Ongoing, Progressing  1/3/2024 1251 by Penelope Millan, OT  Outcome: Ongoing, Progressing

## 2024-01-03 NOTE — PT/OT/SLP PROGRESS
Recreational Therapy Treatment    Date of Treatment: 01/03/23  Start Time: 1030  Stop Time: 1100  Total Time: 30 min  Missed Time:   due to      General Precautions: fall, hearing impaired  Ortho Precautions: N/A  Braces: N/A    Vitals   Vitals at Rest  /71   HR 68   O2 Sat    Pain      Vitals With Activity  /70   HR 71   O2 Sat    Pain        Treatment     Cognitive Skills Building   Cognitive Observation Activity Assist Position Equipment Response            Comment:          Dynamic Activities   Activity Assist Position Equipment Response   Activity 1 Bocce ball supervision and minimum assistance Standing Rolling walker and Bocce balls fair   Comment: Sit to stand was supervision as was dynamic standing balance/reaching.  Standing tolerance was 3 minutes.  Hand over hand assist needed for motor planning to RUE. Comprehension was supervision.  He was cooperative.  He stayed awake for this treatment       Fine Motor Activities   Activity Assist Position Equipment Response           Comment:          Additional Info: Pt was very sleepy at beginning of treatment but woke up when asked to stand    Goals     Short Term Goals    Goal  Goal Status   Will increase sit to stand to supervision Met   Will improved dynamic standing balance/reaching to supervision Met   Will increase RUE coordination/dexteriety to min Progressing           Long Term Goals    Goal Goal Status   Will increase standing tolerance to 5 minutes Progressing   Will improve dynamic standing balance/reaching to setup Progressing   Will increase RUE coordination/dexteriety to supervision Progressing

## 2024-01-03 NOTE — PROGRESS NOTES
01/03/24 1030   Rec Therapy Time Calculation   Date of Treatment 01/03/23   Rec Start Time 1030   Rec Stop Time 1100   Rec Total Time (min) 30 min   Time   Treatment time 2 units   Charges   $Therapeutic Exercise 2 units   Precautions   General Precautions fall;hearing impaired   Orthopedic Precautions  N/A   Braces N/A   Pain/Comfort   Pain Rating 1 no pain   Vital Signs   Pulse 69   BP (!) 147/74   OTHER   Rehab identified problem list/impairments weakness;impaired endurance;impaired functional mobility;gait instability;impaired balance;impaired cognition;decreased coordination;decreased upper extremity function;decreased lower extremity function;decreased safety awareness;decreased ROM;impaired coordination;impaired fine motor   Values/Beliefs/Spiritual Care   Spiritual, Cultural Beliefs, Caodaism Practices, Values that Affect Care no   Overall Level of Functioning   Activity Tolerance Standby Assist   Dynamic Sitting Balance/Reaching Mod Indep   Dynamic Standing Balance/Reaching Standby Assist   Right UE Coodination/Dexterity Min A   Left UE Coordination/Dexterity Independent   Problem Solving/Sequencing Skills Mod Indep   Memory Recall Mod Indep   R/L Neglect/Inattention Does not occur   Attention Span Standby Assist   Social Interaction Mod Indep   Recreational Therapy Short Term Goals   Short Term Goal 1 Progression Met   Short Term Goal 2 Progression Met   Short Term Goal 3 Progression Progressing   Recreational Therapy Long Term Goals   Long Term Goal 1 Progression Progressing   Long Term Goal 2 Progression Progressing   Long Term Goal 3 Progression Progressing   Plan   Patient to be seen Daily   Planned Duration Other (Comment)  (2 days)   Treatments Planned Coordination;Energy conservation training;Fine motor;Safety education;Cognitive training   Treatment plan/goals estblished with Patient/Caregiver Yes

## 2024-01-04 LAB
ANION GAP SERPL CALC-SCNC: 5 MEQ/L
BASOPHILS # BLD AUTO: 0.06 X10(3)/MCL
BASOPHILS NFR BLD AUTO: 0.8 %
BUN SERPL-MCNC: 28.9 MG/DL (ref 8.4–25.7)
CALCIUM SERPL-MCNC: 10.6 MG/DL (ref 8.8–10)
CHLORIDE SERPL-SCNC: 112 MMOL/L (ref 98–111)
CO2 SERPL-SCNC: 23 MMOL/L (ref 23–31)
CREAT SERPL-MCNC: 1.42 MG/DL (ref 0.73–1.18)
CREAT/UREA NIT SERPL: 20
EOSINOPHIL # BLD AUTO: 0.34 X10(3)/MCL (ref 0–0.9)
EOSINOPHIL NFR BLD AUTO: 4.4 %
ERYTHROCYTE [DISTWIDTH] IN BLOOD BY AUTOMATED COUNT: 13.4 % (ref 11.5–17)
GFR SERPLBLD CREATININE-BSD FMLA CKD-EPI: 46 MLS/MIN/1.73/M2
GLUCOSE SERPL-MCNC: 104 MG/DL (ref 75–121)
HCT VFR BLD AUTO: 44.6 % (ref 42–52)
HGB BLD-MCNC: 14.3 G/DL (ref 14–18)
IMM GRANULOCYTES # BLD AUTO: 0.04 X10(3)/MCL (ref 0–0.04)
IMM GRANULOCYTES NFR BLD AUTO: 0.5 %
LYMPHOCYTES # BLD AUTO: 1.41 X10(3)/MCL (ref 0.6–4.6)
LYMPHOCYTES NFR BLD AUTO: 18.3 %
MCH RBC QN AUTO: 28.4 PG (ref 27–31)
MCHC RBC AUTO-ENTMCNC: 32.1 G/DL (ref 33–36)
MCV RBC AUTO: 88.5 FL (ref 80–94)
MONOCYTES # BLD AUTO: 0.64 X10(3)/MCL (ref 0.1–1.3)
MONOCYTES NFR BLD AUTO: 8.3 %
NEUTROPHILS # BLD AUTO: 5.21 X10(3)/MCL (ref 2.1–9.2)
NEUTROPHILS NFR BLD AUTO: 67.7 %
NRBC BLD AUTO-RTO: 0 %
PLATELET # BLD AUTO: 344 X10(3)/MCL (ref 130–400)
PMV BLD AUTO: 10.6 FL (ref 7.4–10.4)
POTASSIUM SERPL-SCNC: 4.1 MMOL/L (ref 3.5–5.1)
RBC # BLD AUTO: 5.04 X10(6)/MCL (ref 4.7–6.1)
SODIUM SERPL-SCNC: 140 MMOL/L (ref 132–146)
WBC # SPEC AUTO: 7.7 X10(3)/MCL (ref 4.5–11.5)

## 2024-01-04 PROCEDURE — 99900035 HC TECH TIME PER 15 MIN (STAT)

## 2024-01-04 PROCEDURE — 99233 SBSQ HOSP IP/OBS HIGH 50: CPT | Mod: ,,, | Performed by: NURSE PRACTITIONER

## 2024-01-04 PROCEDURE — 97116 GAIT TRAINING THERAPY: CPT | Mod: CQ

## 2024-01-04 PROCEDURE — 97110 THERAPEUTIC EXERCISES: CPT

## 2024-01-04 PROCEDURE — 97535 SELF CARE MNGMENT TRAINING: CPT

## 2024-01-04 PROCEDURE — 80048 BASIC METABOLIC PNL TOTAL CA: CPT | Performed by: NURSE PRACTITIONER

## 2024-01-04 PROCEDURE — 11800000 HC REHAB PRIVATE ROOM

## 2024-01-04 PROCEDURE — 25000003 PHARM REV CODE 250: Performed by: NURSE PRACTITIONER

## 2024-01-04 PROCEDURE — 85025 COMPLETE CBC W/AUTO DIFF WBC: CPT | Performed by: NURSE PRACTITIONER

## 2024-01-04 PROCEDURE — 97530 THERAPEUTIC ACTIVITIES: CPT

## 2024-01-04 PROCEDURE — 97530 THERAPEUTIC ACTIVITIES: CPT | Mod: CQ

## 2024-01-04 PROCEDURE — 94761 N-INVAS EAR/PLS OXIMETRY MLT: CPT

## 2024-01-04 PROCEDURE — 63600175 PHARM REV CODE 636 W HCPCS: Performed by: NURSE PRACTITIONER

## 2024-01-04 RX ORDER — FUROSEMIDE 10 MG/ML
20 INJECTION INTRAMUSCULAR; INTRAVENOUS ONCE
Status: COMPLETED | OUTPATIENT
Start: 2024-01-04 | End: 2024-01-04

## 2024-01-04 RX ADMIN — TAMSULOSIN HYDROCHLORIDE 0.4 MG: 0.4 CAPSULE ORAL at 08:01

## 2024-01-04 RX ADMIN — CARVEDILOL 6.25 MG: 6.25 TABLET, FILM COATED ORAL at 08:01

## 2024-01-04 RX ADMIN — POLYETHYLENE GLYCOL 3350 17 G: 17 POWDER, FOR SOLUTION ORAL at 08:01

## 2024-01-04 RX ADMIN — ATORVASTATIN CALCIUM 20 MG: 10 TABLET, FILM COATED ORAL at 08:01

## 2024-01-04 RX ADMIN — DOCUSATE SODIUM 100 MG: 100 CAPSULE, LIQUID FILLED ORAL at 08:01

## 2024-01-04 RX ADMIN — CARVEDILOL 6.25 MG: 6.25 TABLET, FILM COATED ORAL at 04:01

## 2024-01-04 RX ADMIN — AMLODIPINE BESYLATE 10 MG: 5 TABLET ORAL at 08:01

## 2024-01-04 RX ADMIN — FUROSEMIDE 20 MG: 10 INJECTION, SOLUTION INTRAMUSCULAR; INTRAVENOUS at 09:01

## 2024-01-04 RX ADMIN — FINASTERIDE 5 MG: 5 TABLET, FILM COATED ORAL at 09:01

## 2024-01-04 RX ADMIN — ASPIRIN 81 MG: 81 TABLET, COATED ORAL at 08:01

## 2024-01-04 RX ADMIN — CEFTRIAXONE SODIUM 1 G: 1 INJECTION, POWDER, FOR SOLUTION INTRAMUSCULAR; INTRAVENOUS at 02:01

## 2024-01-04 NOTE — PLAN OF CARE
Called son Sander to again inquire re: HH agency preference.  FOC done for [facility].  On chart.    Sent request for prior auth to Liv with [facility] team for HH approval.    Sent referral to [facility] via [facility] for HH PT/OT/nursing.

## 2024-01-04 NOTE — PROGRESS NOTES
01/04/24 1030   Rec Therapy Time Calculation   Date of Treatment 01/04/24   Rec Start Time 1030   Rec Stop Time 1100   Rec Total Time (min) 30 min   Time   Treatment time 2 units   Charges   $Therapeutic Exercise 2 units   Precautions   General Precautions fall;hearing impaired   Orthopedic Precautions  N/A   Braces N/A   Pain/Comfort   Pain Rating 1 no pain   Vital Signs   Pulse 81   /75   OTHER   Rehab identified problem list/impairments weakness;impaired endurance;impaired functional mobility;gait instability;impaired balance;visual deficits;impaired cognition;decreased coordination;decreased upper extremity function;decreased lower extremity function;decreased safety awareness;impaired coordination;impaired fine motor   Values/Beliefs/Spiritual Care   Spiritual, Cultural Beliefs, Pentecostal Practices, Values that Affect Care no   Overall Level of Functioning   Activity Tolerance Mod Indep   Dynamic Sitting Balance/Reaching Mod Indep   Dynamic Standing Balance/Reaching Min A   Right UE Coodination/Dexterity Min A   Left UE Coordination/Dexterity Mod Indep   Problem Solving/Sequencing Skills Min A   Memory Recall Min A   R/L Neglect/Inattention Standby assist   Attention Span Standby Assist   Social Interaction Standby Assist   Recreational Therapy Short Term Goals   Short Term Goal 1 Progression Progressing   Short Term Goal 2 Progression Progressing   Short Term Goal 3 Progression Progressing   Recreational Therapy Long Term Goals   Long Term Goal 1 Progression Met   Long Term Goal 2 Progression Progressing   Long Term Goal 3 Progression Progressing   Plan   Patient to be seen Daily   Planned Duration Other (Comment)  (1 day)   Treatments Planned Cognitive training;Coordination;Energy conservation training;Fine motor;Safety education   Treatment plan/goals estblished with Patient/Caregiver Yes

## 2024-01-04 NOTE — PLAN OF CARE
Problem: Fall Injury Risk  Goal: Absence of Fall and Fall-Related Injury  Outcome: Ongoing, Progressing  Intervention: Promote Injury-Free Environment  Flowsheets (Taken 1/4/2024 9247)  Safety Promotion/Fall Prevention:   assistive device/personal item within reach   bed alarm set   Fall Risk signage in place   lighting adjusted   nonskid shoes/socks when out of bed   /camera at bedside   side rails raised x 3   supervised activity   Supervised toileting - stay within arms reach   instructed to call staff for mobility

## 2024-01-04 NOTE — PT/OT/SLP PROGRESS
Physical Therapy Inpatient Rehab Treatment    Patient Name:  Chichi Mckee   MRN:  [id number]    Recommendations:     Discharge Recommendations:  Low Intensity Therapy   Discharge Equipment Recommendations:     Barriers to discharge: Impaired functional mobility     Assessment:     Chichi Mckee is a 89 y.o. male admitted with a medical diagnosis of Stroke.  He presents with the following impairments/functional limitations:  weakness, impaired endurance, impaired functional mobility, gait instability, impaired balance, visual deficits, impaired cognition, decreased coordination, decreased upper extremity function, decreased lower extremity function, decreased safety awareness, impaired coordination, impaired fine motor .    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 Pt. Has a past medical history of HTN, HLD, PAD, aortic stenosis, bladder mass, BPH, and very [place]    General Precautions: Standard, hearing impaired     Orthopedic Precautions:N/A     Braces: N/A    Rehab Prognosis: Good; patient would benefit from acute skilled PT services to address these deficits and reach maximum level of function.      History:     Past Medical History:   Diagnosis Date    Hypertension        No past surgical history on file.    Subjective     Patient comments: n/a    Respiratory Status: Room air    Patients cultural, spiritual, Gnosticism conflicts given the current situation:      Objective:     Communicated with rn prior to session.  Patient found up in chair with qureshi catheter, peripheral IV  upon PT entry to room.          Vitals With Activity  /70   HR 85   O2 Sat     Pain         Functional Mobility:        Current   Status  Discharge   Goal   Functional Area: Care Score:    Roll Left and Right   Independent   Sit to Lying 4 Independent   Lying to Sitting on Side of Bed   Independent   Sit to Stand 4  W/rw Set-up/clean-up   Chair/Bed-to-Chair  Transfer 4  W/rw Set-up/clean-up   Car Transfer  Set-up/clean-up   Walk 10 Feet 4 Set-up/clean-up   Walk 50 Feet with Two Turns 4  Pt amb 100ft w/rw  and slow pace Set-up/clean-up   Walk 150 Feet  Set-up/clean-up   Walk 10 Feet Uneven Surface  Supervision or touching assistance   1 Step (Curb)  Supervision or touching assistance   4 Steps  Not applicable   12 Steps  Not applicable   Picking Up Object   Independent   Wheel 50 Feet with Two Turns   Not applicable   Wheel 150 Feet   Not applicable         Activity Tolerance: Good    Patient left supine with all lines intact and call button in reach.    Education provided: transfer training, bed mob, and gait training    Expected compliance: High compliance    Plan:     During this hospitalization, patient to be seen 5 x/week (5-7 x week) to address the identified rehab impairments via gait training, therapeutic activities, therapeutic exercises, neuromuscular re-education, wheelchair management/training and progress toward the following goals:    GOALS:   Multidisciplinary Problems       Physical Therapy Goals          Problem: Physical Therapy    Goal Priority Disciplines Outcome Goal Variances Interventions   Physical Therapy Goal     PT, PT/OT Ongoing, Progressing     Description: Bed Mobility:  Roll left and right with setup/clean-up assist.  Sit to supine transfer with setup/clean-up assist.  Supine to sit transfer with setup/clean-up assist.    Transfers:  Sit to stand transfer with setup/clean-up assist using RW.  Bed to chair transfer with setup/clean-up assist Stand Step  using RW.  MET-Car transfer with supervision/touching assist using RW.  MET- an object from the ground in standing position with supervision/touching assist using RW.    Mobility:  Met-Ambulate 150 feet with supervision/touching assist using RW.  MET-Ambulate 15 feet on uneven surfaces/ramps with supervision/touching assist using RW.  MET-Pt ascended/descended a 4 inch curb with  supervision/touching assist using RW.  MET-Ascend/descend 4 stairs with supervision/touching assist using bilateral handrails.                        Plan of Care Expires:  01/09/24  PT Next Visit Date: 01/09/24  Plan of Care reviewed with: patient, family    Additional Information:         Time Tracking:     Therapy Time  PT Received On: 01/04/24  PT Start Time: 1400  PT Stop Time: 1430  PT Total Time (min): 30 min   PT Individual: 30  Missed Time:    Time Missed due to:      Billable Minutes: Gait Training 15 and Therapeutic Activity 15    01/04/2024

## 2024-01-04 NOTE — PT/OT/SLP PROGRESS
Physical Therapy Inpatient Rehab Treatment    Patient Name:  Chichi Mckee   MRN:  [id number]    Recommendations:     Discharge Recommendations:  Low Intensity Therapy   Discharge Equipment Recommendations:     Barriers to discharge: Impaired functional mobility     Assessment:     Chichi Mckee is a 89 y.o. male admitted with a medical diagnosis of Stroke.  He presents with the following impairments/functional limitations:  weakness, impaired endurance, impaired functional mobility, gait instability, impaired balance, visual deficits, impaired cognition, decreased coordination, decreased upper extremity function, decreased lower extremity function, decreased safety awareness, impaired coordination, impaired fine motor .    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 Pt. Has a past medical history of HTN, HLD, PAD, aortic stenosis, bladder mass, BPH, and very [place]    General Precautions: Standard, hearing impaired     Orthopedic Precautions:N/A     Braces: N/A    Rehab Prognosis: Good; patient would benefit from acute skilled PT services to address these deficits and reach maximum level of function.      History:     Past Medical History:   Diagnosis Date    Hypertension        No past surgical history on file.    Subjective     Patient comments: n/a    Respiratory Status: Room air    Patients cultural, spiritual, Pentecostal conflicts given the current situation:      Objective:     Communicated with rn prior to session.  Patient found up in chair with qureshi catheter, peripheral IV  upon PT entry to room.      Vitals   Vitals at Rest  /70   HR 79   O2 Sat    Pain      Vitals With Activity  /71   HR 78   O2 Sat     Pain         Functional Mobility:        Current   Status  Discharge   Goal   Functional Area: Care Score:    Roll Left and Right   Independent   Sit to Lying   Independent   Lying to Sitting on Side of Bed   Independent   Sit  to Stand 4  W/rw Set-up/clean-up   Chair/Bed-to-Chair Transfer 4  W/rw Set-up/clean-up   Car Transfer 4  W/rw Set-up/clean-up   Walk 10 Feet 4 Set-up/clean-up   Walk 50 Feet with Two Turns 4 Set-up/clean-up   Walk 150 Feet 4  Pt amb 150ft w/rw and cga for safety, pt required increase time for amb.  Set-up/clean-up   Walk 10 Feet Uneven Surface 4  W/rw and cga, preformed on blue mat Supervision or touching assistance   1 Step (Curb)   Supervision or touching assistance   4 Steps 4 Not applicable   12 Steps 4  Pt preformed 12 steps on 4in step in // bars. Not applicable   Picking Up Object   Independent   Wheel 50 Feet with Two Turns   Not applicable   Wheel 150 Feet   Not applicable           Activity Tolerance: Fair    Patient left up in chair with all lines intact and call button in reach.    Education provided: roles and goals of PT/PTA, transfer training, gait training, and stair training    Expected compliance: High compliance    Plan:     During this hospitalization, patient to be seen 5 x/week (5-7 x week) to address the identified rehab impairments via gait training, therapeutic activities, therapeutic exercises, neuromuscular re-education, wheelchair management/training and progress toward the following goals:    GOALS:   Multidisciplinary Problems       Physical Therapy Goals          Problem: Physical Therapy    Goal Priority Disciplines Outcome Goal Variances Interventions   Physical Therapy Goal     PT, PT/OT Ongoing, Progressing     Description: Bed Mobility:  Roll left and right with setup/clean-up assist.  Sit to supine transfer with setup/clean-up assist.  Supine to sit transfer with setup/clean-up assist.    Transfers:  Sit to stand transfer with setup/clean-up assist using RW.  Bed to chair transfer with setup/clean-up assist Stand Step  using RW.  MET-Car transfer with supervision/touching assist using RW.  MET- an object from the ground in standing position with supervision/touching assist  using RW.    Mobility:  Met-Ambulate 150 feet with supervision/touching assist using RW.  MET-Ambulate 15 feet on uneven surfaces/ramps with supervision/touching assist using RW.  MET-Pt ascended/descended a 4 inch curb with supervision/touching assist using RW.  MET-Ascend/descend 4 stairs with supervision/touching assist using bilateral handrails.                        Plan of Care Expires:  01/09/24  PT Next Visit Date: 01/09/24  Plan of Care reviewed with: patient, family    Additional Information:         Time Tracking:     Therapy Time  PT Received On: 01/04/24  PT Start Time: 1100  PT Stop Time: 1200  PT Total Time (min): 60 min   PT Individual: 60  Missed Time:    Time Missed due to:      Billable Minutes: Therapeutic Activity 60    01/04/2024

## 2024-01-04 NOTE — PROGRESS NOTES
Inpatient Nutrition Assessment    Admit Date: 12/21/2023   Total duration of encounter: 14 days   Patient Age: 89 y.o.    Nutrition Recommendation/Prescription     Continue current diet as tolerated.   Continue Boost Original (240 kcal, 10 g pro/svg) BID to assist pt in meeting est nutrition needs      Communication of Recommendations:  EMR    Nutrition Assessment     Malnutrition Assessment/Nutrition-Focused Physical Exam  Does not meet criteria     A minimum of two characteristics is recommended for diagnosis of either severe or non-severe malnutrition.    Chart Review    Reason Seen: physician consult for New rehab admit     Malnutrition Screening Tool Results   Have you recently lost weight without trying?: No  Have you been eating poorly because of a decreased appetite?: No   MST Score: 0   Diagnosis:  Left posterior frontal and temporal lobe CVA s/p TNK with rt upper extremity weakness  Hypertension and Stroke  left vertebral artery occlusion  Dysarthria  Aphasia  hyperlipidemia,  PAD  moderate aortic stenosis  bladder mass  BPH   acute kidney injury     Relevant Medical History:   HTN, HLD, PAD, aortic stenosis, bladder mass, [place], and BPH       Scheduled Medications:  amLODIPine, 10 mg, Daily  aspirin, 81 mg, Daily  atorvastatin, 20 mg, Daily  carvediloL, 6.25 mg, BID WM  cefTRIAXone (ROCEPHIN) IVPB, 1 g, Q24H  docusate sodium, 100 mg, BID  finasteride, 5 mg, Daily  polyethylene glycol, 17 g, BID  tamsulosin, 0.4 mg, QHS    Continuous Infusions:   PRN Medications: acetaminophen, ALPRAZolam, benzonatate, bisacodyL, hydrALAZINE, hydrOXYzine pamoate, labetalol, metoprolol, nitroGLYCERIN, ondansetron, ondansetron, promethazine, tramadol    Calorie Containing IV Medications: no significant kcals from medications at this time    Recent Labs   Lab 01/01/24  0621 01/02/24  0536 01/03/24  0554 01/04/24  0555    143 143 140   K 4.1 4.4 4.2 4.1   CALCIUM 10.6* 10.4* 10.3* 10.6*   PHOS 2.7  --   --   --    MG 2.20   --   --   --    CHLORIDE 110 112* 112* 112*   CO2 22* 22* 24 23   BUN 27.6* 28.6* 26.5* 28.9*   CREATININE 1.32* 1.42* 1.56* 1.42*   EGFRNORACEVR 50 46 41 46   GLUCOSE 115 123* 113 104   BILITOT 0.3  --   --   --    ALKPHOS 88  --   --   --    ALT 48  --   --   --    AST 28  --   --   --    ALBUMIN 3.2*  --   --   --    PREALB 22.4  --   --   --    WBC 6.36  --   --  7.70   HGB 14.4  --   --  14.3   HCT 45.5  --   --  44.6     Nutrition Orders:  Diet Adult Regular      Appetite/Oral Intake: good/% of meals  Factors Affecting Nutritional Intake:  missing teeth  per SLP note   Food/Nondenominational/Cultural Preferences: none reported  Food Allergies: no known food allergies  Last Bowel Movement: 01/03/24  Wound(s):  None documented     Comments    12/22: Pt sleeping, attempted to rouse pt mult times. Unable to rouse. Noted pt passed MBS on (12/21) on regular/thin liquids. Appetite is good, staff documented pt ate 75% breakfast this am. Also with good appetite at main campus prior to admit ~75% x 5 meals documented.  LBM on (12/19) - colace on board. Noted large wt discrepancy per chart review. See wt hx below. Suspect wt from (12/16) inaccurate as appears was carried over from (6/29/22).     12/28: Pt with severe hearing impairment and subsequently unable to respond to most questions. CNA assisting feeds with pt at bedside. Pt with good appetite, eating 75 - 100% most meals.  Observed eating ~75% breakfast- using hands to eat. CNA reports pt can use utensils and does use with gravies, stews etc, however prefers to use hands with other items. Drinking liquids and boost. CNA denies pt with any GI issues at this time. No new wt. Unable to complete physical assessment at this time d/t pt eating. Did not observe any physical signs of malnutrition at bedside.     1/4: Pt continues with good appetite, eating >/=75% meals. No new nutrition related concerns noted/reported at this time. Scheduled to d/c tomorrow.  "    Anthropometrics    Height: 5' 10" (177.8 cm), Height Method: Stated  Last Weight: 79.3 kg (174 lb 13.2 oz) (12/30/23 0718),    BMI (Calculated): 25.1  BMI Classification: normal (BMI 18.5-24.9)        Ideal Body Weight (IBW), Male: 166 lb                                Usual Weight Provided By: EMR weight history    Wt Readings from Last 5 Encounters:   12/30/23 79.3 kg (174 lb 13.2 oz)   12/16/23 89.8 kg (198 lb)   06/29/22 89.8 kg (198 lb)     Weight Change(s) Since Admission: N/A  Wt Readings from Last 1 Encounters:   12/30/23 0718 79.3 kg (174 lb 13.2 oz)   12/21/23 1717 79.5 kg (175 lb 4.3 oz)   Admit Weight: 79.5 kg (175 lb 4.3 oz) (12/21/23 1717),      Estimated Needs    Weight Used For Calorie Calculations: 79.5 kg (175 lb 4.3 oz)  Energy Calorie Requirements (kcal): 1873  Energy Need Method: Gordon-St Jeor (x 1.3 SF)  Weight Used For Protein Calculations: 79.5 kg (175 lb 4.3 oz)  Protein Requirements: 119 (1.5 g/kg)  Fluid Requirements (mL): 1873    Enteral Nutrition     Patient not receiving enteral nutrition at this time.    Parenteral Nutrition     Patient not receiving parenteral nutrition support at this time.    Evaluation of Received Nutrient Intake    Calories: meeting estimated needs  Protein: meeting estimated needs    Patient Education     Not applicable.    Nutrition Diagnosis     PES:  N/A related to  N/A as evidenced by N/A. (new)       Nutrition Interventions     Intervention(s): general/healthful diet    Goal: Meet greater than 80% of nutritional needs by follow-up. (goal met)  Goal: Maintain weight throughout hospitalization. (goal met)    Nutrition Goals & Monitoring     Dietitian will monitor: food and beverage intake, weight, weight change, electrolyte/renal panel, and glucose/endocrine profile    Nutrition Risk/Follow-Up: low (follow-up in 5-7 days)   Please consult if re-assessment needed sooner.    "

## 2024-01-04 NOTE — PROGRESS NOTES
Subjective  HPI:  89 year old BM with a PMH of HTN, HLD, PAD, aortic stenosis, bladder mass, [place], and BPH presented to the ED as a Code Fast on 12/16/2023 with complaints of slurred speech and RUE weakness and numbness starting at 11:00 a.m. that morning.  CT head was negative.  Lab work revealed BRIAN.  He was given TNK at 12:48 p.m. and admitted to ICU. 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 There is a long segment area of multifocal narrowing in the basilar artery which could represent vasospasm or areas of multifocal narrowing.  60-70% stenosis in the proximal left internal carotid artery secondary to plaque.  No large vessel occlusion is seen.  Prelim echo showed EF 55-60%, negative bubble study.  24 hr repeat CT head showed no acute intracranial hemorrhage.  MRI brain without contrast showed patchy acute ischemic changes in the left posterior frontal and temporal lobes.  Moderate chronic microvascular ischemic changes. Patient kept in ICU additional 24 hours for frequent neuro checks. Patient originally going to undergo DSA, but family decided against it, due to patient's age. VS have remained stable, BRIAN resolved.  The patient was deemed suitable for transfer to the floor. Care was transferred to hospital medicine for further management. On 12/20, BP ranging 139/69- 187/93, HR ranging 54-81 bpm. Amlodipine increased to 10mg due to elevated BP. Started Atorvastatin 20mg daily per neuro. PT/OT evals completed with deficits noted with recommendation for high intensity therapy needed. Speech swallowing eval showed no signs of aspiration so recommended regular diet. Patient is AAOx3; on standard aspiration precautions.   Participating with therapy. Functional status includes moderate assist needed for transfers using RW, moderate assist for balance while standing for grooming task of brushing teeth, CGA for walking 230 ft with RW, minimal assist for bed mobility, moderate  assist for lower body dressing, and moderate assist for toileting. Patient was evaluated, accepted, and admitted to inpatient rehab to improve functional status. Transferred to [facility] on 12/21 without incident.     1/4: Seen with OT, seated in WC. OT reports significant functional decline noted starting yesterday. Renal indices noted to be increasing despite IVF and urinary retention suspected. Bladder scan done and qureshi catheter placed. Noted hematuria in tube today with clot. RT reports improved functional ability today as in comparison to yesterday. CNA also reports patient transferring easier. Participating in therapy without complaint. Planned discharge tomorrow with family. Training completed. VSSAF.           Review of Systems   Depression/Anxiety: no complaints     ALPRAZolam tablet 0.25 mg TID PRN Anxiety  Pain: denies     acetaminophen tablet 650 mg q4h PRN mild pain  traMADoL tablet 50 mg q8h PRN mod/severe pain  Bowels/Bladder: last BM 1/3 x2; remains Incontinent  -toileting schedule q2h  -Urinary retention-Qureshi catheter (hematuria)  Appetite: good     Sleep: good                Physical Exam  General: well-developed, well-nourished, in no acute distress, [place]  Respiratory: equal chest rise, no SOB, no audible wheeze  Cardiovascular: regular rate and rhythm, no edema  Gastrointestinal: soft, non-tender, non-distended   Musculoskeletal: right sided weakness  Integumentary: no rashes or skin lesions present  Neurologic: cranial nerves intact, right sided weakness              Labs:   Latest Reference Range & Units 01/03/24 09:34 01/04/24 05:55   WBC 4.50 - 11.50 x10(3)/mcL  7.70   RBC 4.70 - 6.10 x10(6)/mcL  5.04   Hemoglobin 14.0 - 18.0 g/dL  14.3   Hematocrit 42.0 - 52.0 %  44.6   MCV 80.0 - 94.0 fL  88.5   MCH 27.0 - 31.0 pg  28.4   MCHC 33.0 - 36.0 g/dL  32.1 (L)   RDW 11.5 - 17.0 %  13.4   Platelet Count 130 - 400 x10(3)/mcL  344   MPV 7.4 - 10.4 fL  10.6 (H)   Neut % %  67.7   LYMPH % %  18.3   Mono  % %  8.3   Eosinophil % %  4.4   Basophil % %  0.8   Immature Granulocytes %  0.5   Neut # 2.1 - 9.2 x10(3)/mcL  5.21   Lymph # 0.6 - 4.6 x10(3)/mcL  1.41   Mono # 0.1 - 1.3 x10(3)/mcL  0.64   Eos # 0 - 0.9 x10(3)/mcL  0.34   Baso # <=0.2 x10(3)/mcL  0.06   Immature Grans (Abs) 0 - 0.04 x10(3)/mcL  0.04   nRBC %  0.0   Sodium 132 - 146 mmol/L  140   Potassium 3.5 - 5.1 mmol/L  4.1   Chloride 98 - 111 mmol/L  112 (H)   CO2 23 - 31 mmol/L  23   Anion Gap mEq/L  5.0   BUN 8.4 - 25.7 mg/dL  28.9 (H)   Creatinine 0.73 - 1.18 mg/dL  1.42 (H)   BUN/CREAT RATIO   20   eGFR mls/min/1.73/m2  46   Glucose 75 - 121 mg/dL  104   Calcium 8.8 - 10.0 mg/dL  10.6 (H)   Color, UA Yellow, Light-Yellow, Dark Yellow, Ana, Straw  Yellow    Appearance, UA Clear  SL CLOUDY !    Specific Gravity,UA 1.005 - 1.030  1.015    pH, UA 5.0 - 8.5  7.0    Protein, UA Negative  Negative    Glucose, UA Negative, Normal  Negative    Ketones, UA Negative  Negative    Occult Blood UA Negative  Large !    NITRITE UA Negative  Negative    Bilirubin, UA Negative  Negative    Urobilinogen, UA 0.2, 1.0, Normal  0.2    Leukocytes, UA Negative  Large !    RBC, UA None Seen, 0-2, 3-5, 0-5 /HPF 6-10 !    WBC, UA None Seen, 0-2, 3-5, 0-5 /HPF 6-10 !    Bacteria, UA None Seen, Rare, Occasional /HPF Many !    Squam Epithel, UA None Seen, Rare, Occasional, Occ /HPF None Seen    (L): Data is abnormally low  (H): Data is abnormally high  !: Data is abnormal        Assessment/Plan  Hospital   Stroke   Right sided weakness     Non-Hospital   Bladder mass   Essential hypertension   HLD (hyperlipidemia)   VHD (valvular heart disease)   BPH (benign prostatic hyperplasia)       Wounds: none noted  Precautions: fall risk  Bracing: RW  Swallowing: Regular Diet  Function: Tolerating therapy. Continue PT/OT  VTE Prophylaxis: SCDs  Code Status: FULL CODE   Discharge:Lives with his spouse and daughter in [place] in a single-story home with a ramp with bilateral rails to enter  the residence. Completed 3rd grade. He was in the [employer]. 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 Per the patients son, the family is nearby. Their daughter lives with them. The family takes care of the household and yard work and manages finances and medications. They will have assistance from the family after the rehab stay. Children: (4).  Date 1/5 Friday.

## 2024-01-04 NOTE — PROGRESS NOTES
[facility] ([facility])  Rehab Progress Note    Patient Name: Chichi Mckee  MRN: [id number]  Age: 89 y.o. 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  PMH significant for  HTN, HLD, PAD, aortic stenosis, history of bladder mass, and BPH.  Workup significant for CVA.  Code fast initiated.  CT head negative.  T and K initiated at 12:48 p.m..  Admitted to ICU.  CTA head and neck significant for left vertebral artery occluded proximally.  LVEF  55-60% with negative bubble study.  Repeat CT head negative for intracranial hemorrhage.  MRI brain with contrast significant for patchy acute ischemic changes in the left posterior frontal and temporal lobes.  Diagnostic angiogram planned with Interventional Neurology on , but family declined due to advanced age.  Neurology recommended daily aspirin 81 mg daily and Lipitor 20 mg daily.  Tolerated transfer to [facility]  inpatient rehab unit on  without incident.   Today's Information: No acute events overnight.  Standing and working with therapy.  Working on balance with horse shoes.  Sleep hygiene and bowel maintenance at goal.  Vital signs at goal with no recorded fevers.  CBC unremarkable.  BUN creatinine 28.9/1.42-improved.  No new imaging today.  Review of patient's allergies indicates:   Allergen Reactions    Hydrocodone-acetaminophen         Current Facility-Administered Medications:     acetaminophen tablet 650 mg, 650 mg, Oral, Q4H PRN, Nitin, Nimesh A, FNP, 650 mg at 23 0750    ALPRAZolam tablet 0.25 mg, 0.25 mg, Oral, TID PRN, Nitin, Nimesh A, FNP     amLODIPine tablet 10 mg, 10 mg, Oral, Daily, Seema Colon, FNP, 10 mg at 01/03/24 0723    aspirin EC tablet 81 mg, 81 mg, Oral, Daily, Nitin, Nimesh A, FNP, 81 mg at 01/03/24 0723    atorvastatin tablet 20 mg, 20 mg, Oral, Daily, Nitin, Nimesh A, FNP, 20 mg at 01/03/24 0723    benzonatate capsule 100 mg, 100 mg, Oral, TID PRN, Nitin, Nimesh A, FNP    bisacodyL suppository 10 mg, 10 mg, Rectal, Daily PRN, Nitin, Nimesh A, FNP    carvediloL tablet 6.25 mg, 6.25 mg, Oral, BID WM, Nitin, Nimesh A, FNP, 6.25 mg at 01/03/24 1646    cefTRIAXone (ROCEPHIN) 1 g in dextrose 5 % in water (D5W) 100 mL IVPB (MB+), 1 g, Intravenous, Q24H, Nitin, Nimesh A, FNP, Stopped at 01/03/24 1514    docusate sodium capsule 100 mg, 100 mg, Oral, BID, Nitin, Nimesh A, FNP, 100 mg at 01/03/24 2042    finasteride tablet 5 mg, 5 mg, Oral, Daily, Nitin, Nimesh A, FNP, 5 mg at 01/03/24 0724    furosemide injection 20 mg, 20 mg, Intravenous, Once, Nitin, Nimesh A, FNP    hydrALAZINE injection 10 mg, 10 mg, Intravenous, Q4H PRN, Nitin, Nimesh A, FNP    hydrOXYzine pamoate capsule 50 mg, 50 mg, Oral, Nightly PRN, Nitin, Nimesh A, FNP    labetalol 20 mg/4 mL (5 mg/mL) IV syring, 10 mg, Intravenous, Q4H PRN, Nitin, Nimesh A, FNP, 10 mg at 12/26/23 0623    metoprolol injection 10 mg, 10 mg, Intravenous, Q2H PRN, Nitin, Nimesh A, FNP    nitroGLYCERIN SL tablet 0.4 mg, 0.4 mg, Sublingual, Q5 Min PRN, Nitin, Nimesh A, FNP    ondansetron disintegrating tablet 4 mg, 4 mg, Oral, Q6H PRN, Nitin, Nimesh A, FNP    ondansetron disintegrating tablet 8 mg, 8 mg, Oral, Q6H PRN, Nitin, Nimesh A, FNP    polyethylene glycol packet 17 g, 17 g, Oral, BID, Seema Colon FNP, 17 g at 01/03/24 2042    promethazine tablet 25 mg, 25 mg, Oral, Q6H PRN, Nitin, Nimesh A, FNP    tamsulosin 24 hr capsule 0.4 mg, 0.4 mg, Oral, QHS, Nitin, Nimesh A, FNP, 0.4 mg at 01/03/24 2042     "traMADoL tablet 50 mg, 50 mg, Oral, Q8H PRN, Nimesh Taveras FNP     Review of Systems   Complete 12-point review of symptoms negative except for what's mentioned in HPI     Objective:     /67   Pulse 73   Temp 98 °F (36.7 °C) (Oral)   Resp 18   Ht 5' 10" (1.778 m)   Wt 79.3 kg (174 lb 13.2 oz)   SpO2 96%   BMI 25.08 kg/m²        Physical Exam  Vitals reviewed.   HENT:      Head:      Comments: [place]  Eyes:      Pupils: Pupils are equal, round, and reactive to light.   Cardiovascular:      Rate and Rhythm: Normal rate and regular rhythm.      Comments: Heart sounds: Murmur heard.   Systolic murmur is present with a grade of 3/6.   Pulmonary:      Effort: Pulmonary effort is normal.      Breath sounds: Normal breath sounds.   Abdominal:      General: Bowel sounds are normal.   Genitourinary:     Comments: Indwelling catheter with hematuria  Musculoskeletal:         General: Normal range of motion.      Comments: Right-sided weakness   Skin:     General: Skin is warm.   Neurological:      General: No focal deficit present.      Mental Status: He is alert and oriented to person, place, and time.      Motor: Weakness present.   Psychiatric:         Mood and Affect: Mood normal.      *MD performed and documented physical examination       Lines/Drains/Airways       Drain  Duration                  Urethral Catheter 01/03/24 2000 Non-latex 16 Fr. <1 day              Peripheral Intravenous Line  Duration                  Peripheral IV - Single Lumen 01/03/24 1438 20 G Posterior;Right Forearm <1 day                    Labs  Recent Results (from the past 24 hour(s))   Urinalysis, Reflex to Urine Culture    Collection Time: 01/03/24  9:34 AM    Specimen: Urine   Result Value Ref Range    Color, UA Yellow Yellow, Light-Yellow, Dark Yellow, Ana, Straw    Appearance, UA SL CLOUDY (A) Clear    Specific Gravity, UA 1.015 1.005 - 1.030    pH, UA 7.0 5.0 - 8.5    Protein, UA Negative Negative    Glucose, UA " Negative Negative, Normal    Ketones, UA Negative Negative    Blood, UA Large (A) Negative    Bilirubin, UA Negative Negative    Urobilinogen, UA 0.2 0.2, 1.0, Normal    Nitrites, UA Negative Negative    Leukocyte Esterase, UA Large (A) Negative   Urinalysis, Microscopic    Collection Time: 01/03/24  9:34 AM   Result Value Ref Range    Bacteria, UA Many (A) None Seen, Rare, Occasional /HPF    RBC, UA 6-10 (A) None Seen, 0-2, 3-5, 0-5 /HPF    WBC, UA 6-10 (A) None Seen, 0-2, 3-5, 0-5 /HPF    Squamous Epithelial Cells, UA None Seen None Seen, Rare, Occasional, Occ /HPF   Basic Metabolic Panel    Collection Time: 01/04/24  5:55 AM   Result Value Ref Range    Sodium Level 140 132 - 146 mmol/L    Potassium Level 4.1 3.5 - 5.1 mmol/L    Chloride 112 (H) 98 - 111 mmol/L    Carbon Dioxide 23 23 - 31 mmol/L    Glucose Level 104 75 - 121 mg/dL    Blood Urea Nitrogen 28.9 (H) 8.4 - 25.7 mg/dL    Creatinine 1.42 (H) 0.73 - 1.18 mg/dL    BUN/Creatinine Ratio 20     Calcium Level Total 10.6 (H) 8.8 - 10.0 mg/dL    Anion Gap 5.0 mEq/L    eGFR 46 mls/min/1.73/m2   CBC with Differential    Collection Time: 01/04/24  5:55 AM   Result Value Ref Range    WBC 7.70 4.50 - 11.50 x10(3)/mcL    RBC 5.04 4.70 - 6.10 x10(6)/mcL    Hgb 14.3 14.0 - 18.0 g/dL    Hct 44.6 42.0 - 52.0 %    MCV 88.5 80.0 - 94.0 fL    MCH 28.4 27.0 - 31.0 pg    MCHC 32.1 (L) 33.0 - 36.0 g/dL    RDW 13.4 11.5 - 17.0 %    Platelet 344 130 - 400 x10(3)/mcL    MPV 10.6 (H) 7.4 - 10.4 fL    Neut % 67.7 %    Lymph % 18.3 %    Mono % 8.3 %    Eos % 4.4 %    Basophil % 0.8 %    Lymph # 1.41 0.6 - 4.6 x10(3)/mcL    Neut # 5.21 2.1 - 9.2 x10(3)/mcL    Mono # 0.64 0.1 - 1.3 x10(3)/mcL    Eos # 0.34 0 - 0.9 x10(3)/mcL    Baso # 0.06 <=0.2 x10(3)/mcL    IG# 0.04 0 - 0.04 x10(3)/mcL    IG% 0.5 %    NRBC% 0.0 %     Radiology  MRI brain without contrast on 12/18/2023, IMPRESSION: Patchy acute ischemic changes in the left posterior frontal and temporal lobes. Moderate chronic  microvascular ischemic changes.  Assessment/Plan:     89 y.o. AAM admitted on 12/21/2023     Ischemic CVA   - to left posterior frontal and temporal lobes with right sided weakness  - continue                Aspirin 81 mg daily                Lipitor 20 mg daily   - defer to physiatry for rehab and pain management  - PT/OT/RT/ST following     PAD  - stable   - continue                Aspirin 81 mg daily                Lipitor 20 mg daily      HLD  -  FLP outpatient with PCP  - continue                Lipitor 20 mg daily       VHD  -  aortic stenosis  -  to follow-up with cardiology outpatient     HTN  - BP improved  - continue                Norvasc 10 mg daily (initiated 12/23)                Hydralazine 10 mg every 2 hours as needed for BP > 160/90                Labetalol 10 mg every 2 hours as needed for BP > 160/90  - low sodium diet     Constipation  - stable   - continue  Colace 100 mg BID   Miralax 17 gm BID (initiated 12/27)     BPH  - stable  - continue                Finasteride 5 mg daily                 Flomax 0.4 mg at bedtime    BRIAN 2/2 obstructive uropathy  - improved  - indwelling catheter initiated on 01/03  - currently on Proscar and Flomax    Hematuria  - current  - continue to monitor  - not currently on blood thinners     VTE Prophylaxis:  SCDs  COVID-19 testing:  unknown  COVID-19 vaccination status:  vaccinated     POA: No  Living will: No  Contacts: Awa Mckee (dtr) [phone]       CODE STATUS: Full Code  Internal Medicine (attending): Alvarez Zayas MD  Physiatry (consulting):  Wilder Pham MD     OUTPATIENT PROVIDERS    PCP:  [facility]    Neurology:  Tyron Rahman MD     DISPOSITION:  Sleep hygiene, bowel maintenance, and appetite at goal.  Last BM 1/3.  Vital signs at goal recorded fevers.  Cbc unremarkable.  Renal indices slightly improved.  Initiate Lasix 20 mg IVP x1 dose now.  Currently with hematuria.  Not on blood thinners.  Continue to follow closely.  Currently with  indwelling catheter.  Plan to follow-up with urology outpatient.  Continue aggressive mobilization as tolerated.  Monitor closely.  Notify of acute changes.    Staffing 1/2/2023: Incontinent of bowel and bladder. No skin issues. RT: Overall min assist with right sided weakness.  Some balance issues.  Appetite is fair-good.  Meeting needs with boost.  PT: Overall BSA to CGA with transfers and gait.  Ambulating 130 feet with RW-slow.  CBA to CGA for technique, but does well.  Needs min assist for curbs and ramps.  Family training went well.  Limited by left foot drag, worsens with fatigue. High fall risk.  OT: Max assist with bathing and footwear.  [place] non use do to family doing things at home.  Overall CGA with standing if given time.  Supervision with oral care.  Limited by [place] and cognition.  Family training went well. Needs 24/7 care. Projected discharge 1/5 home with HH and 24/7 care.  Projected discharge 1/5.       Slava Taveras NP conducted independent physical examination and assisted with medical documentation.

## 2024-01-04 NOTE — PT/OT/SLP PROGRESS
Occupational Therapy Inpatient Rehab Treatment    Name: Chichi Mckee  MRN: [id number]    Assessment:  Chichi Mckee is a 89 y.o. male admitted with a medical diagnosis of Stroke.  He presents with the following impairments/functional limitations:  weakness, impaired endurance, impaired sensation, impaired self care skills, impaired functional mobility, gait instability, impaired balance, visual deficits, impaired cognition, decreased coordination, decreased upper extremity function, decreased lower extremity function, decreased safety awareness, impaired coordination, impaired fine motor.    General Precautions: Standard, fall, hearing impaired     Orthopedic Precautions:N/A     Braces: N/A    Rehab Prognosis: Poor; patient would benefit from acute skilled OT services to address these deficits and reach maximum level of function.      History:     Past Medical History:   Diagnosis Date    Hypertension        No past surgical history on file.    Subjective     Orientation: Identifies self  Pt was sound asleep in chair for AM & PM sessions and difficult to fully awake. Pt drowsy but participated in session.    Vitals   Vitals:    01/04/24 0900 01/04/24 1000 01/04/24 1300 01/04/24 1330   BP: 134/67 135/72 130/78 **see PT note   Pulse: 77 80 82 82      Respiratory Status: Room air    Patients cultural, spiritual, Yazdanism conflicts given the current situation: no     Objective:     Patient found in AM in recliner chair, reclined and sound asleep; in PM, pt up in W/C sound asleep with qureshi catheter, peripheral IV  upon OT entry to room.    Mobility   Patient completed:  Sit to Stand Transfer with maximal assistance with rolling walker  Stand to Sit Transfer with moderate assistance with rolling walker  Toilet Transfer Step Transfer technique with moderate assistance and maximal assistance with  rolling walker with assistance guiding the walker and facilitating advancing RLE through step pattern.    Functional  Mobility  In room mobility with RW for toileting    ADLs in AM   Current Status   Toileting Hygiene 2 AM: incontinent of bowel and assist with wiping and garments, pt assisted with pushing down pants over hips.  **In AM session, OT emptied qureshi catheter of 1000cc.  In PM, 650cc. Reported urin output to Dr. Zayas during rounds in PM.    Toilet Transfer 3 Mod-max assist with guiding walker through turning in front of toilet and RLE follow through with step.     Limiting Factors for ADLs: motor, sensory, endurance, limited ROM, balance, weakness, coordination, cognition, and safety awareness     Therapeutic Activities  In PM, performed ROM arc with LUE 1 set of 24 reps. Pt unable to grasp tabs with R hand without the assist from left but still could not maintain grasp. Pt also performed peg board activity with RUE. Pt had difficult time grasping and manipulating peg to place on peg board without significant [place] IRA assist.    Therapeutic Exercise  AM: Pt performed UB strengthening with UBE for 5 mins forward and 3 mins backward with no resistance. Pt was able to maintain  on handle without [place] IRA assist.    Additional Notes: OT noted significant decline in FMC & GMC of R UE/LE. Findings verbally reported to the following: NP's Slava and Iwona and Dr Zayas.    Patient left up in chair with all lines intact and Isabel, PTA present.     Education provided: Roles and goals of OT, ADLs, transfer training, assistive device, sequencing, safety precautions, and fall prevention    Multidisciplinary Problems       Occupational Therapy Goals          Problem: Occupational Therapy    Goal Priority Disciplines Outcome Interventions   Occupational Therapy Goal     OT, PT/OT Ongoing, Progressing    Description: ADLs:  Pt to perform oral care tasks with set up while standing at sink MET  Pt to perform UB dressing with SPV.  Pt to perform LB dressing with Partial mod assist with AE as needed.   Pt to perform putting on/off footwear task  with Partial mod with AE as needed.  Pt to perform toileting with SPV.  Pt to perform bathing with Partial mod assist with AE as needed.    Functional Transfers:  Pt to perform toilet transfers with incidental touching.  Pt to perform a tub transfer with incidental touching.    Balance, Strengthening, Endurance, Balance:  Pt to consistently demonstrate adherence to orthopedic precautions during all ADL's as instructed by OT.  Pt to demonstrate good dynamic standing balance as required to perform ADL's from standing level.  Pt to demonstrate good BUE strength during functional task   Pt to demonstrate consistent adherence to breathing control and energy conservation techniques as educated by OT.                        Time Tracking   ** Pt seen BID [id number] & [id number].  OT Received On: 01/04/24  Time In  (0900, 1330)     Time Out  (1000, 1400)  Total Time    Therapy Time: OT Individual: 90  Missed Time:    Missed Time Reason:      Billable Minutes: Self Care/Home Management 45, Therapeutic Activity 30, and Therapeutic Exercise 15    01/04/2024

## 2024-01-04 NOTE — PLAN OF CARE
Discharge PHQ-2 completed (score=0 negative).    Reviewed plans for discharge tomorrow.    Pt's son, Sander, finally sent residence pics for PT/OT to view, so I emailed those to therapy team.    Still awaiting decision re:  agency preference.

## 2024-01-04 NOTE — PT/OT/SLP DISCHARGE
"Recreational Therapy Discharge    Pt able to recall previously discussed LIFESTYLE CHANGE:  "To eat better."      Date of Treatment: 01/04/24  Start Time: 1030  Stop Time: 1100  Total Time: 30 min  Missed Time:     Assessment      Chichi Mckee is a 89 y.o. male admitted with a medical diagnosis of Stroke.  He presents with the following impairments/functional limitations:  weakness, impaired endurance, impaired functional mobility, gait instability, impaired balance, visual deficits, impaired cognition, decreased coordination, decreased upper extremity function, decreased lower extremity function, decreased safety awareness, impaired coordination, impaired fine motor .    Rehab Diagnosis:     Recent Surgery:    General Precautions: Standard, fall, hearing impaired     Orthopedic Precautions:N/A     Braces: N/A    Rehab Prognosis: Fair; patient would benefit from acute skilled Recreational Therapy services to address these deficits and reach maximum level of function.      Impairments: Balance deficits, Cognitive deficits, Coordination deficits, Decreased knowledge of condition, Endurance deficits, Mobility deficits, Safety awareness deficits, Strength deficits, and Visual perceptual deficits  Rehab Potential: Fair, due to: Severity of impairment   Treatment Recommendations: Complete discharge plan  Treatment Diagnosis: Acute L posterior frontal and temporal lobe CVA, TNK, R UE weakness, HTN, HLD, PAD, aortic stenosis, bladder mass, BPH, [place]  Orientation: Identifies self  Affect/Behavior: Cooperative  Safety/Judgement: impaired   Basic Command Following: impaired  Spiritual Cultural: no        History     Past Medical History:   Diagnosis Date    Hypertension        No past surgical history on file.    Home Environment     Admit Date: 12/21/23  Living Situation  People in Home: spouse, child(anai), adult  Name(s) of People in Home: Awa Mckee  Lives in: house  Patients Responsibilities: Retired, " "Leisure/play/hobbies  Number of Children: 4  Occupation:Retired:  and     Instrumental Activities of Daily Living     Previous Hand Dominance: Right Current Hand Dominance: Right (RUE weakness)     Other iADL Information:        Cognitive Skills Building         Cognitive Observation Activity Assist Position Equipment Response            Comment:      Dynamic Activities      Activity Assist Position Equipment Response   Activity 1 Horseshoes minimum assistance Standing Rolling walker and Horseshoes fair   Comment: Sit to stand was min as was dynamic standing balance/reaching.  Standing tolerance was 15 minutes.  RUE coordination was min.  Hand over hand assist needed for motor planning of RUE.  Comprehension  and memory were min.  Hearing deficit continue to interfere with participation       Fine Motor Activities      Activity Assist Position Equipment Response           Comment:        Goals     Patient Goals  Patient Goal 1: "To walk better."    Short Term Goals    Goal  Goal Status   Will increase sit to stand to supervision Progressing   Will improved dynamic standing balance/reaching to supervision Progressing   Will increase RUE coordination/dexteriety to min Progressing           Long Term Goals    Goal Goal Status   Will increase standing tolerance to 5 minutes Met   Will improve dynamic standing balance/reaching to setup Progressing   Will increase RUE coordination/dexteriety to supervision Progressing               Plan       Patient to be seen: Daily  Duration: Other (Comment) (1 day)  Treatments planned: Cognitive training, Coordination, Energy conservation training, Fine motor, Safety education  Treatment plan/goals established with Patient/Caregiver: Yes     "

## 2024-01-05 VITALS
HEART RATE: 75 BPM | RESPIRATION RATE: 18 BRPM | TEMPERATURE: 98 F | WEIGHT: 174.81 LBS | SYSTOLIC BLOOD PRESSURE: 148 MMHG | BODY MASS INDEX: 25.03 KG/M2 | DIASTOLIC BLOOD PRESSURE: 76 MMHG | HEIGHT: 70 IN | OXYGEN SATURATION: 100 %

## 2024-01-05 PROCEDURE — 25000003 PHARM REV CODE 250: Performed by: NURSE PRACTITIONER

## 2024-01-05 PROCEDURE — 97530 THERAPEUTIC ACTIVITIES: CPT

## 2024-01-05 RX ORDER — CARVEDILOL 6.25 MG/1
6.25 TABLET ORAL 2 TIMES DAILY WITH MEALS
Qty: 60 TABLET | Refills: 11 | Status: SHIPPED | OUTPATIENT
Start: 2024-01-05 | End: 2025-01-04

## 2024-01-05 RX ADMIN — FINASTERIDE 5 MG: 5 TABLET, FILM COATED ORAL at 09:01

## 2024-01-05 RX ADMIN — CARVEDILOL 6.25 MG: 6.25 TABLET, FILM COATED ORAL at 09:01

## 2024-01-05 RX ADMIN — AMLODIPINE BESYLATE 10 MG: 5 TABLET ORAL at 09:01

## 2024-01-05 RX ADMIN — ATORVASTATIN CALCIUM 20 MG: 10 TABLET, FILM COATED ORAL at 09:01

## 2024-01-05 RX ADMIN — ASPIRIN 81 MG: 81 TABLET, COATED ORAL at 09:01

## 2024-01-05 RX ADMIN — DOCUSATE SODIUM 100 MG: 100 CAPSULE, LIQUID FILLED ORAL at 09:01

## 2024-01-05 NOTE — PT/OT/SLP DISCHARGE
Occupational Therapy Discharge Summary    Chichi Mckee  MRN: [id number]   Principal Problem: Stroke      Patient Discharged from acute Occupational Therapy on 1/5/2024.  Please refer to prior OT note dated 1/4/2024 for functional status.    Assessment:      Goals partially met. Patient appropriate for care in another setting. Limitations for meeting goals are medical changes. Family training completed and all questions and concerns addressed.    Pt able to recall lifestyle change of staying active.    Virtual home assessment completed and pt has all bathroom equipment needs met. Noted pt's ramp at home is very steep and it is recommended that pt utilize W/C up/down ramp vs using RW.    Objective:     GOALS:   Multidisciplinary Problems       Occupational Therapy Goals          Problem: Occupational Therapy    Goal Priority Disciplines Outcome Interventions   Occupational Therapy Goal     OT, PT/OT Ongoing, Progressing    Description: ADLs:  Pt to perform oral care tasks with set up while standing at sink MET  Pt to perform UB dressing with SPV.  Pt to perform LB dressing with Partial mod assist with AE as needed.   Pt to perform putting on/off footwear task with Partial mod with AE as needed.  Pt to perform toileting with SPV.  Pt to perform bathing with Partial mod assist with AE as needed.    Functional Transfers:  Pt to perform toilet transfers with incidental touching.  Pt to perform a tub transfer with incidental touching.    Balance, Strengthening, Endurance, Balance:  Pt to consistently demonstrate adherence to orthopedic precautions during all ADL's as instructed by OT.  Pt to demonstrate good dynamic standing balance as required to perform ADL's from standing level.  Pt to demonstrate good BUE strength during functional task   Pt to demonstrate consistent adherence to breathing control and energy conservation techniques as educated by OT.                        Care Scores:   Admission Assessment Current  Status Discharge  Goal   Functional Area: Care Score:  Care Score:    Eating 5 5 Set-up/clean-up   Oral Hygiene 4 5 Set-up/clean-up   Toileting Hygiene 3 2 Set-up/clean-up   Shower/Bathe Self 2 3 Supervision or touching assistance   Upper Body Dressing 3 3 Independent   Lower Body Dressing 2 2 Supervision or touching assistance   Putting On/Taking Off Footwear 1 2 Partial/moderate assistance   Toilet Transfer 3 3 Set-up/clean-up     Reasons for Discontinuation of Therapy Services   Transfer to alternate level of care.      Plan:     Patient Discharged to: Home with Home Health Service      1/5/2024

## 2024-01-05 NOTE — PT/OT/SLP DISCHARGE
Physical Therapy Discharge Summary    Name: Chichi Mckee  MRN: [id number]   Principal Problem: Stroke     Patient Discharged from acute Physical Therapy on 01/05/24.     Assessment:     Goals partially met. Patient appropriate for care in another setting.    Pt able to recall lifestyle change of staying active.    Virtual home assessment completed: noted that pts ramp at home is very steep, recommended to family that pt be pushed up ramp in w/c or utilize railings if needing to stand/ambulate vs use a RW.    Objective:     GOALS:   Multidisciplinary Problems       Physical Therapy Goals          Problem: Physical Therapy    Goal Priority Disciplines Outcome Goal Variances Interventions   Physical Therapy Goal     PT, PT/OT Adequate for Care Transition     Description: Bed Mobility:  MET - Roll left and right with setup/clean-up assist.  NOT MET - Sit to supine transfer with setup/clean-up assist.  MET - Supine to sit transfer with setup/clean-up assist.    Transfers:  NOT MET - Sit to stand transfer with setup/clean-up assist using RW.  NOT MET - Bed to chair transfer with setup/clean-up assist Stand Step  using RW.  MET-Car transfer with supervision/touching assist using RW.  MET- an object from the ground in standing position with supervision/touching assist using RW.    Mobility:  Met-Ambulate 150 feet with supervision/touching assist using RW.  MET-Ambulate 15 feet on uneven surfaces/ramps with supervision/touching assist using RW.  MET-Pt ascended/descended a 4 inch curb with supervision/touching assist using RW.  MET-Ascend/descend 4 stairs with supervision/touching assist using bilateral handrails.                        Care Scores:   Admission Assessment Current   Status  Discharge   Goal   Functional Area: Care Score:  Care Score:    Roll Left and Right 4 6 Independent   Sit to Lying 4 4 Independent   Lying to Sitting on Side of Bed 4 6 Independent   Sit to Stand 3 4 Set-up/clean-up    Chair/Bed-to-Chair Transfer 3 4 Set-up/clean-up   Car Transfer 88 4 Set-up/clean-up   Walk 10 Feet 3 4 Set-up/clean-up   Walk 50 Feet with Two Turns 3 4 Set-up/clean-up   Walk 150 Feet 88 4 Set-up/clean-up   Walk 10 Feet Uneven Surface 3 4 Supervision or touching assistance   1 Step (Curb) 88 4 Supervision or touching assistance   4 Steps 9 4 Not applicable   12 Steps 9 4 Not applicable   Picking Up Object 88 4 Independent   Wheel 50 Feet with Two Turns 9 3 Not applicable   Wheel 150 Feet 9 3 Not applicable       Reasons for Discontinuation of Therapy Services  Transfer to alternate level of care. and Satisfactory goal achievement.      Plan:     Patient Discharged to: Home with Home Health Service.    1/5/2024

## 2024-01-05 NOTE — PLAN OF CARE
Problem: Rehabilitation (IRF) Plan of Care  Goal: Plan of Care Review  Outcome: Met  Goal: Patient-Specific Goal (Individualized)  Outcome: Met  Goal: Absence of New-Onset Illness or Injury  Outcome: Ongoing, Progressing  Goal: Optimal Comfort and Wellbeing  Outcome: Met  Goal: Readiness for Transition of Care  Outcome: Ongoing, Progressing

## 2024-01-05 NOTE — NURSING
D/c instructions given to pt and son who verbalized understanding. Pt left via w/c w/staff to car, condition stable

## 2024-01-05 NOTE — PLAN OF CARE
Discharging today as planned.    Alerted [facility]'s [facility] via [facility].  They will provide nursing/PT/OT.  Will send AVS when completed.    No home DME ordered.    Family training complete.

## 2024-01-05 NOTE — PLAN OF CARE
Problem: Fall Injury Risk  Goal: Absence of Fall and Fall-Related Injury  Outcome: Ongoing, Progressing  Intervention: Promote Injury-Free Environment  Flowsheets (Taken 1/5/2024 0054)  Safety Promotion/Fall Prevention:   assistive device/personal item within reach   bed alarm set   Fall Risk signage in place   lighting adjusted   nonskid shoes/socks when out of bed   /camera at bedside   side rails raised x 3   supervised activity   instructed to call staff for mobility

## 2024-01-05 NOTE — DISCHARGE SUMMARY
[facility] ([facility])  Rehab Discharge Summary    Patient Name: Chichi Mckee  MRN: [id number]  Age: 89 y.o. Sex: male  : 1929  Hospital Length of Stay: 15 days   Date of Service: 2024      Discharge Information   Date of Admission: 2023  Date of Discharge: 2024  Admit Diagnosis: Ischemic CVA         PAD          HLD         VHD          HTN         Constipation          BPH         Urinary retention  Discharge Diagnosis: Ischemic CVA (stable)           PAD (stable)           HLD (stable)           VHD (stable)           HTN (stable)           Constipation (stable)           BPH (current)           Urinary retention (current)           Hematuria (resolving)    COVID-19 testing:  unknown  COVID-19 vaccination status:  vaccinated    Internal Medicine (attending): Alvarez Zayas MD  Physiatry (consulting):  Wilder Pham MD     OUTPATIENT PROVIDERS    [facility]  [facility]    Neurology:  Tyron Rahman MD    Hospital Course   89-year-old  male presented to [facility] ED on 2023 complaining of slurred speech and right upper extremity weakness and numbness starting at 11:00 a.m..  PMH significant for  HTN, HLD, PAD, aortic stenosis, history of bladder mass, and BPH.  Workup significant for CVA.  Code fast initiated.  CT head negative.  T and K initiated at 12:48 p.m..  Admitted to ICU.  CTA head and neck significant for left vertebral artery occluded proximally.  LVEF  55-60% with negative bubble study.  Repeat CT head negative for intracranial hemorrhage.  MRI brain with contrast significant for patchy acute ischemic changes in the left posterior frontal and temporal lobes.  Diagnostic angiogram planned with Interventional Neurology on , but family declined due to advanced age.  Neurology recommended daily aspirin 81 mg daily and Lipitor 20 mg daily.  Tolerated transfer to [facility]  inpatient rehab unit on  without incident.      During inpatient  "rehab course Norvasc 10 mg daily increased on 12/23.  Sleep hygiene, bowel maintenance, and appetite remained at goal.  Continue to participate in progress in therapy.  Limited by severe hearing deficit.  Renal indices begin trending up slightly on 01/01 and IVF initiated.  Coreg 3.125 mg b.i.d. initiated on 01/2 d/t HTN.  IVF continued due to BRIAN.  Indwelling catheter initiated on 1/3 due to obstructive uropathy with bladder scan over 1300 mL.  UA significant for blood and leukocytes.  Rocephin initiated.  Coreg increased to 6.25 mg b.i.d. on 01/03.  Urine culture negative.  Rocephin discontinued on 1/5.  Received Lasix 20 mg IVP x1 dose on 01/04.  Renal indices improved.  He did have some hematuria on 01/04 that began improving on 01/05.  Continue with indwelling catheter inserted on 01/03.  To follow-up with urology outpatient.  Vital signs at goal with no recorded fevers.  Cbc unremarkable.  Renal indices improving.  Med reconciliation completed.  Discharge orders initiated.  Stable for transfer home with home health and family care.  To follow-up with scheduled appointments documented below.    Chief Complaint: Ischemic CVA to left posterior frontal and temporal lobes with right sided weakness     BP (!) 148/76   Pulse 75   Temp 98.1 °F (36.7 °C) (Oral)   Resp 18   Ht 5' 10" (1.778 m)   Wt 79.3 kg (174 lb 13.2 oz)   SpO2 100%   BMI 25.08 kg/m²      Physical Exam  Constitutional:       Appearance: Normal appearance.   HENT:      Head: Normocephalic.      Comments: Hard of hearing     Mouth/Throat:      Mouth: Mucous membranes are moist.   Eyes:      Pupils: Pupils are equal, round, and reactive to light.   Cardiovascular:      Rate and Rhythm: Normal rate and regular rhythm.      Heart sounds: Normal heart sounds.   Pulmonary:      Effort: Pulmonary effort is normal.      Breath sounds: Normal breath sounds.   Abdominal:      General: Bowel sounds are normal.      Palpations: Abdomen is soft. "   Genitourinary:     Comments: Indwelling catheter-urine pink/yellow  Musculoskeletal:      Cervical back: Neck supple.      Comments: Right-sided weakness    Skin:     General: Skin is warm and dry.   Neurological:      Mental Status: He is alert and oriented to person, place, and time.      Motor: Weakness present.   Psychiatric:         Mood and Affect: Mood normal. Affect is flat.         Speech: Speech is delayed.         Behavior: Behavior is slowed. Behavior is cooperative.         Thought Content: Thought content normal.         Cognition and Memory: Cognition is impaired.         Judgment: Judgment is impulsive.     *MD performed and documented physical examination          Labs  Recent Results (from the past 48 hour(s))   Basic Metabolic Panel    Collection Time: 01/04/24  5:55 AM   Result Value Ref Range    Sodium Level 140 132 - 146 mmol/L    Potassium Level 4.1 3.5 - 5.1 mmol/L    Chloride 112 (H) 98 - 111 mmol/L    Carbon Dioxide 23 23 - 31 mmol/L    Glucose Level 104 75 - 121 mg/dL    Blood Urea Nitrogen 28.9 (H) 8.4 - 25.7 mg/dL    Creatinine 1.42 (H) 0.73 - 1.18 mg/dL    BUN/Creatinine Ratio 20     Calcium Level Total 10.6 (H) 8.8 - 10.0 mg/dL    Anion Gap 5.0 mEq/L    eGFR 46 mls/min/1.73/m2   CBC with Differential    Collection Time: 01/04/24  5:55 AM   Result Value Ref Range    WBC 7.70 4.50 - 11.50 x10(3)/mcL    RBC 5.04 4.70 - 6.10 x10(6)/mcL    Hgb 14.3 14.0 - 18.0 g/dL    Hct 44.6 42.0 - 52.0 %    MCV 88.5 80.0 - 94.0 fL    MCH 28.4 27.0 - 31.0 pg    MCHC 32.1 (L) 33.0 - 36.0 g/dL    RDW 13.4 11.5 - 17.0 %    Platelet 344 130 - 400 x10(3)/mcL    MPV 10.6 (H) 7.4 - 10.4 fL    Neut % 67.7 %    Lymph % 18.3 %    Mono % 8.3 %    Eos % 4.4 %    Basophil % 0.8 %    Lymph # 1.41 0.6 - 4.6 x10(3)/mcL    Neut # 5.21 2.1 - 9.2 x10(3)/mcL    Mono # 0.64 0.1 - 1.3 x10(3)/mcL    Eos # 0.34 0 - 0.9 x10(3)/mcL    Baso # 0.06 <=0.2 x10(3)/mcL    IG# 0.04 0 - 0.04 x10(3)/mcL    IG% 0.5 %    NRBC% 0.0 %       Radiology  MRI brain without contrast on 12/18/2023, IMPRESSION: Patchy acute ischemic changes in the left posterior frontal and temporal lobes. Moderate chronic microvascular ischemic changes.    Discharge Summary Plan   Discharge Status:  Improved    Location: Discharge home with home health    Medications: See discharge medicine reconciliation    Activity:  As tolerated    Diet:  Regular diet with thin liquids    Instructions:  Take all medications as prescribed.      Attend appointments as scheduled.      Return to ED if symptoms worsen, or if t > 100.4.    Education:  CVA.  HTN.  Hematuria.    Follow-up:  Supriya Reyes MD office to call with follow-up appointment      Tyron Rahman MD on 02/06/2024 at 2:00 p.m.      Sudden urology-to follow-up with 1 week.  Office to call with follow-up appointment    Discussed plan of care, and patient communicated understanding. Agreed to comply with recommendations.    Slava Taveras NP conducted independent examination and assisted with medical documentation.     Discharge Time: 36 minutes

## 2024-01-05 NOTE — PT/OT/SLP PROGRESS
Physical Therapy      Patient Name:  Chichi Mckee   MRN:  [id number]    Patient not seen today secondary to polite refusal, wanting to stay in bed and save his strength for pending d/c today. D/C BIMS/CAM/Pain completed; increased time needed to complete d/t pt very [place]. Last visit completed, all questions/concerns addressed as appropriate.    PT in room with pt [id number]:  1 Theract charge for time spent in room

## 2024-02-05 LAB — PATH REV: NORMAL

## 2024-02-06 ENCOUNTER — OFFICE VISIT (OUTPATIENT)
Dept: NEUROLOGY | Facility: CLINIC | Age: 89
End: 2024-02-06
Payer: OTHER GOVERNMENT

## 2024-02-06 VITALS
SYSTOLIC BLOOD PRESSURE: 124 MMHG | HEIGHT: 70 IN | HEART RATE: 57 BPM | WEIGHT: 174 LBS | DIASTOLIC BLOOD PRESSURE: 68 MMHG | BODY MASS INDEX: 24.91 KG/M2

## 2024-02-06 DIAGNOSIS — I63.9 CEREBROVASCULAR ACCIDENT (CVA), UNSPECIFIED MECHANISM: Primary | ICD-10-CM

## 2024-02-06 PROCEDURE — 99999 PR PBB SHADOW E&M-EST. PATIENT-LVL III: CPT | Mod: PBBFAC,,, | Performed by: NURSE PRACTITIONER

## 2024-02-06 PROCEDURE — 99213 OFFICE O/P EST LOW 20 MIN: CPT | Mod: PBBFAC | Performed by: NURSE PRACTITIONER

## 2024-02-06 PROCEDURE — 99213 OFFICE O/P EST LOW 20 MIN: CPT | Mod: S$PBB,,, | Performed by: NURSE PRACTITIONER

## 2024-02-06 NOTE — PROGRESS NOTES
Subjective:      Patient ID: Chichi Mckee is a 89 y.o. male.    Chief Complaint:  Hospital Follow Up (Patient denies HA, slurred speech , blurred vision or dizziness. Patient is experiencing weakness since stroke. )      History of Present Illness  Patient with medical history of HTN, HLD, PAD, aortic stenosis, bladder mass, and BPH presents for follow up of stroke. Patient presented to ED on 12/16/23 with reports of slurred speech, RUE weakness and numbness. CT head was negative. TNKwas given. CVA workup revealed left vertebral artery occluded proximally. Patient had an EF of 55-60%, negative bubble study. MRI brain showed patchy acute ischemic changes in the left posterior frontal and temporal lobes. LDL was above 100. Today patient reports still with weakness. Denies any new onset of numbness, tingling, headache or visual changes.      Past Medical History:   Diagnosis Date    Hypertension        History reviewed. No pertinent surgical history.    History reviewed. No pertinent family history.    Social History     Socioeconomic History    Marital status:    Tobacco Use    Smoking status: Never    Smokeless tobacco: Never   Substance and Sexual Activity    Alcohol use: Yes    Drug use: Never     Social Determinants of Health     Transportation Needs: Patient Unable To Answer (1/5/2024)    PRAPARE - Transportation     Lack of Transportation (Medical): Patient unable to answer     Lack of Transportation (Non-Medical): Patient unable to answer       Current Outpatient Medications   Medication Sig Dispense Refill    amLODIPine (NORVASC) 10 MG tablet Take 1 tablet (10 mg total) by mouth once daily. 30 tablet 2    aspirin (ECOTRIN) 81 MG EC tablet Take 1 tablet (81 mg total) by mouth once daily. 90 tablet 0    atorvastatin (LIPITOR) 20 MG tablet Take 1 tablet (20 mg total) by mouth once daily. 90 tablet 0    carvediloL (COREG) 6.25 MG tablet Take 1 tablet (6.25 mg total) by mouth 2 (two) times  "daily with meals. 60 tablet 11    finasteride (PROSCAR) 5 mg tablet Take 1 tablet (5 mg total) by mouth once daily. 30 tablet 2    tamsulosin (FLOMAX) 0.4 mg Cap Take 1 capsule (0.4 mg total) by mouth every evening. 30 capsule 2     No current facility-administered medications for this visit.       Review of patient's allergies indicates:   Allergen Reactions    Hydrocodone-acetaminophen       Vitals:    02/06/24 1412   BP: 124/68   BP Location: Left arm   Patient Position: Sitting   Pulse: (!) 57   Weight: 78.9 kg (174 lb)   Height: 5' 10" (1.778 m)      Review of Systems  12 point ROS performed and negative unless otherwise documented in HPI  Objective:     Neurologic Exam     Mental Status   Oriented to person, place, and time.   Speech: speech is normal   Level of consciousness: alert  Hard of hearing     Cranial Nerves   Cranial nerves II through XII intact.     Motor Exam Generalized weakness     Sensory Exam   Light touch normal.     Gait, Coordination, and Reflexes Ambulates with walker, slow/weak       Physical Exam  Vitals reviewed.   Cardiovascular:      Rate and Rhythm: Normal rate and regular rhythm.   Pulmonary:      Effort: Pulmonary effort is normal.   Neurological:      Mental Status: He is oriented to person, place, and time.      Cranial Nerves: Cranial nerves 2-12 are intact.   Psychiatric:         Speech: Speech normal.        Assessment:     1. Cerebrovascular accident (CVA), unspecified mechanism        Plan:     Goal BP <140/90  Goal LDL <70; continue statin  Continue ASA        "

## 2024-03-25 PROBLEM — I63.9 STROKE: Status: RESOLVED | Noted: 2023-12-16 | Resolved: 2024-03-25

## 2024-09-09 NOTE — PT/OT/SLP PROGRESS
Patient called wanting to know if she can get in to a therapy pool. Patient was advised she should wait till wound is healed and a few weeks before getting into a pool. She has a post op visit 9-19-24    Physical Therapy Inpatient Rehab Treatment    Patient Name:  Chichi Mckee   MRN:  [id number]    Recommendations:     Discharge Recommendations:  Low Intensity Therapy   Discharge Equipment Recommendations:     Barriers to discharge: Impaired functional mobility     Assessment:     Chichi Mckee is a 89 y.o. male admitted with a medical diagnosis of Stroke.  He presents with the following impairments/functional limitations:  impaired endurance, weakness, impaired self care skills, impaired functional mobility, gait instability, impaired balance, impaired cognition, visual deficits, decreased lower extremity function, decreased upper extremity function, decreased safety awareness, impaired coordination .    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 Pt. Has a past medical history of HTN, HLD, PAD, aortic stenosis, bladder mass, BPH, and very [place]    General Precautions: Standard, hearing impaired     Orthopedic Precautions:N/A     Braces: N/A    Rehab Prognosis: Good; patient would benefit from acute skilled PT services to address these deficits and reach maximum level of function.      History:     Past Medical History:   Diagnosis Date    Hypertension        No past surgical history on file.    Subjective     Patient comments: n/a    Respiratory Status: Room air    Patients cultural, spiritual, Muslim conflicts given the current situation:      Objective:     Communicated with rn prior to session.  Patient found up in chair with peripheral IV, telemetry  upon PT entry to room.        Vitals   Vitals at Rest  /75   HR 73   O2 Sat    Pain      Vitals With Activity  BP (!) 146/84   HR 78   O2 Sat     Pain         Functional Mobility:        Current   Status  Discharge   Goal   Functional Area: Care Score:    Roll Left and Right   Independent   Sit to Lying   Independent   Lying to Sitting on Side of Bed   Independent   Sit to Stand 4  W/rw   Set-up/clean-up   Chair/Bed-to-Chair Transfer 4  W/rw Set-up/clean-up   Car Transfer   Set-up/clean-up   Walk 10 Feet 4 Set-up/clean-up   Walk 50 Feet with Two Turns 4 Set-up/clean-up   Walk 150 Feet 4  Pt amb 300ft w/rw and cga for safety. Pt required increase time due to slow amb speed.  Set-up/clean-up   Walk 10 Feet Uneven Surface   Supervision or touching assistance   1 Step (Curb)   Supervision or touching assistance   4 Steps   Not applicable   12 Steps   Not applicable   Picking Up Object   Independent   Wheel 50 Feet with Two Turns   Not applicable   Wheel 150 Feet   Not applicable         Activity Tolerance: Good    Patient left up in chair with all lines intact and call button in reach.    Education provided: gait training    Expected compliance: High compliance    Plan:     During this hospitalization, patient to be seen 5 x/week (5-7 x week) to address the identified rehab impairments via gait training, therapeutic activities, therapeutic exercises, neuromuscular re-education, wheelchair management/training and progress toward the following goals:    GOALS:   Multidisciplinary Problems       Physical Therapy Goals          Problem: Physical Therapy    Goal Priority Disciplines Outcome Goal Variances Interventions   Physical Therapy Goal     PT, PT/OT Ongoing, Progressing     Description: Bed Mobility:  Roll left and right with setup/clean-up assist.  Sit to supine transfer with setup/clean-up assist.  Supine to sit transfer with setup/clean-up assist.    Transfers:  Sit to stand transfer with setup/clean-up assist using RW.  Bed to chair transfer with setup/clean-up assist Stand Step  using RW.  Car transfer with supervision/touching assist using RW.   an object from the ground in standing position with supervision/touching assist using RW.    Mobility:  Ambulate 150 feet with supervision/touching assist using RW.  Ambulate 15 feet on uneven surfaces/ramps with supervision/touching assist using  RW.  Pt ascended/descended a 4 inch curb with supervision/touching assist using RW.  Ascend/descend 4 stairs with supervision/touching assist using bilateral handrails.                        Plan of Care Expires:  12/28/23  PT Next Visit Date: 12/28/23  Plan of Care reviewed with: patient    Additional Information:         Time Tracking:     Therapy Time  PT Received On: 12/27/23  PT Start Time: 1130  PT Stop Time: 1200  PT Total Time (min): 30 min   PT Individual: 30  Missed Time:    Time Missed due to:      Billable Minutes: Gait Training 30    12/27/2023